# Patient Record
Sex: FEMALE | Race: WHITE | NOT HISPANIC OR LATINO | Employment: OTHER | URBAN - METROPOLITAN AREA
[De-identification: names, ages, dates, MRNs, and addresses within clinical notes are randomized per-mention and may not be internally consistent; named-entity substitution may affect disease eponyms.]

---

## 2017-07-24 ENCOUNTER — TELEPHONE (OUTPATIENT)
Dept: ORTHOPEDIC SURGERY | Facility: CLINIC | Age: 77
End: 2017-07-24

## 2017-07-25 ENCOUNTER — OFFICE VISIT (OUTPATIENT)
Dept: ORTHOPEDIC SURGERY | Facility: CLINIC | Age: 77
End: 2017-07-25

## 2017-07-25 VITALS — TEMPERATURE: 97.3 F | HEIGHT: 62 IN | WEIGHT: 156.8 LBS | BODY MASS INDEX: 28.85 KG/M2

## 2017-07-25 DIAGNOSIS — G89.29 CHRONIC PAIN OF RIGHT KNEE: Primary | ICD-10-CM

## 2017-07-25 DIAGNOSIS — M25.561 CHRONIC PAIN OF RIGHT KNEE: Primary | ICD-10-CM

## 2017-07-25 PROCEDURE — 73562 X-RAY EXAM OF KNEE 3: CPT | Performed by: NURSE PRACTITIONER

## 2017-07-25 PROCEDURE — 99202 OFFICE O/P NEW SF 15 MIN: CPT | Performed by: NURSE PRACTITIONER

## 2017-07-25 RX ORDER — METOPROLOL SUCCINATE 50 MG/1
TABLET, EXTENDED RELEASE ORAL
Refills: 3 | Status: ON HOLD | COMMUNITY
Start: 2017-05-19 | End: 2020-10-22

## 2017-07-25 RX ORDER — PREDNISONE 1 MG/1
TABLET ORAL
Refills: 11 | COMMUNITY
Start: 2017-06-23 | End: 2019-09-04 | Stop reason: SDUPTHER

## 2017-07-25 RX ORDER — MONTELUKAST SODIUM 10 MG/1
10 TABLET ORAL NIGHTLY
COMMUNITY
End: 2019-09-04 | Stop reason: SDUPTHER

## 2017-07-25 RX ORDER — ASPIRIN 81 MG/1
81 TABLET, CHEWABLE ORAL DAILY
COMMUNITY
End: 2020-12-10

## 2017-07-25 RX ORDER — POTASSIUM CHLORIDE 750 MG/1
10 TABLET, EXTENDED RELEASE ORAL 2 TIMES DAILY
COMMUNITY
End: 2019-10-08 | Stop reason: SDUPTHER

## 2017-07-25 RX ORDER — CELECOXIB 100 MG/1
100 CAPSULE ORAL 2 TIMES DAILY PRN
COMMUNITY
End: 2020-02-12

## 2017-07-25 RX ORDER — MELOXICAM 15 MG/1
TABLET ORAL
Refills: 0 | COMMUNITY
Start: 2017-06-19 | End: 2017-10-31

## 2017-07-25 RX ORDER — ERGOCALCIFEROL 1.25 MG/1
CAPSULE ORAL
Refills: 3 | COMMUNITY
Start: 2017-07-09 | End: 2017-08-10

## 2017-07-25 RX ORDER — FUROSEMIDE 40 MG/1
40 TABLET ORAL 2 TIMES DAILY
COMMUNITY
End: 2019-06-20 | Stop reason: SDUPTHER

## 2017-07-25 NOTE — PROGRESS NOTES
Patient: Margaret Gu  YOB: 1940 76 y.o. female  Medical Record Number: [id number]    Chief Complaints:   Chief Complaint   Patient presents with   • Left Knee - Pain, Establish Care   • Right Knee - Pain, Establish Care       History of Present Illness:Margaret Gu is a 76 y.o. female who presents With complaints of severe right knee pain.  Patient reports that it started several months ago and has progressively gotten worse.  She again describes the right knee pain as a severe constant burning type pain with swelling.  She reports the pain is worse with standing sitting walking and only slightly better with ice and rest.  She did see a different orthopedic physician who adjusted cortisone injection a month ago, which did not seem to help much, and she was just started on Celebrex yesterday.  She has been going to physical therapy for the last 3-4 weeks.    Allergies:   Allergies   Allergen Reactions   • Chloraprep One Step [Chlorhexidine Gluconate] Rash     Red and hot   • Dilaudid [Hydromorphone Hcl] Anaphylaxis       Medications:   Current Outpatient Prescriptions   Medication Sig Dispense Refill   • Apoaequorin (PREVAGEN) 10 MG capsule Take  by mouth.     • aspirin 81 MG chewable tablet Chew 81 mg Daily.     • celecoxib (CeleBREX) 100 MG capsule Take 100 mg by mouth 2 (Two) Times a Day As Needed for Mild Pain (1-3).     • furosemide (LASIX) 40 MG tablet Take 40 mg by mouth 2 (Two) Times a Day.     • meloxicam (MOBIC) 15 MG tablet TK 1 T PO QD WF  0   • metoprolol succinate XL (TOPROL-XL) 50 MG 24 hr tablet TK 1 T PO D  3   • montelukast (SINGULAIR) 10 MG tablet Take 10 mg by mouth Every Night.     • potassium chloride (K-DUR,KLOR-CON) 10 MEQ CR tablet Take 10 mEq by mouth 2 (Two) Times a Day.     • predniSONE (DELTASONE) 5 MG tablet TK 1 T PO QAM  11   • vitamin D (ERGOCALCIFEROL) 29715 UNITS capsule capsule TK 1 C PO Q 7 DAYS .  3     No current facility-administered medications  "for this visit.          The following portions of the patient's history were reviewed and updated as appropriate: allergies, current medications, past family history, past medical history, past social history, past surgical history and problem list.    Review of Systems:   A 14 point review of systems was performed. All systems negative except pertinent positives/negative listed in HPI above    Physical Exam:   Vitals:    07/25/17 1400   Temp: 97.3 °F (36.3 °C)   TempSrc: Temporal Artery    Weight: 156 lb 12.8 oz (71.1 kg)   Height: 62\" (157.5 cm)       General: A and O x 3, ASA, NAD    SCLERA:    Normal    DENTITION:   Normal  Skin clear no unusual lesions noted  Right knee patient has trace amount of effusion noted with 115° flexion +10° in extension with a positive Flaca negative Lockman calf is soft and nontender    Radiology:  Xrays 3views (ap,lateral, sunrise) right knee were ordered and reviewed today secondary to pain and show significant bone-on-bone end-stage osteoarthritis.  No comparative views available     Assessment/Plan:  End-stage osteoarthritis right knee    At this point the patient is in the middle of a fall conservative measures.  Would recommend that she continue with physical therapy and Celebrex.  She will return the office in 6 weeks for follow-up with Dr. Castillo and most likely discussed total knee replacement at that time.  "

## 2017-07-25 NOTE — TELEPHONE ENCOUNTER
I would be happy to see the patient today at 1:30 at [place].  Otherwise will need to wait for first available to patient appointment with Dr. Castillo

## 2017-08-09 ENCOUNTER — TELEPHONE (OUTPATIENT)
Dept: ORTHOPEDIC SURGERY | Facility: CLINIC | Age: 77
End: 2017-08-09

## 2017-08-10 ENCOUNTER — OFFICE VISIT (OUTPATIENT)
Dept: ORTHOPEDIC SURGERY | Facility: CLINIC | Age: 77
End: 2017-08-10

## 2017-08-10 VITALS — WEIGHT: 160 LBS | HEIGHT: 62 IN | BODY MASS INDEX: 29.44 KG/M2 | TEMPERATURE: 97.5 F

## 2017-08-10 DIAGNOSIS — M17.11 PRIMARY OSTEOARTHRITIS OF RIGHT KNEE: Primary | ICD-10-CM

## 2017-08-10 PROCEDURE — 20610 DRAIN/INJ JOINT/BURSA W/O US: CPT | Performed by: ORTHOPAEDIC SURGERY

## 2017-08-10 PROCEDURE — 99213 OFFICE O/P EST LOW 20 MIN: CPT | Performed by: ORTHOPAEDIC SURGERY

## 2017-08-10 RX ORDER — MELATONIN
1000 DAILY
COMMUNITY
End: 2017-10-31 | Stop reason: ALTCHOICE

## 2017-08-10 RX ADMIN — BUPIVACAINE HYDROCHLORIDE 2 ML: 5 INJECTION, SOLUTION PERINEURAL at 10:31

## 2017-08-10 RX ADMIN — LIDOCAINE HYDROCHLORIDE 4 ML: 10 INJECTION, SOLUTION INFILTRATION; PERINEURAL at 10:31

## 2017-08-10 RX ADMIN — METHYLPREDNISOLONE ACETATE 80 MG: 80 INJECTION, SUSPENSION INTRA-ARTICULAR; INTRALESIONAL; INTRAMUSCULAR; SOFT TISSUE at 10:31

## 2017-08-17 ENCOUNTER — TELEPHONE (OUTPATIENT)
Dept: ORTHOPEDIC SURGERY | Facility: CLINIC | Age: 77
End: 2017-08-17

## 2017-08-17 NOTE — TELEPHONE ENCOUNTER
Glad she is doing better.  Would recommend continued physical therapy and keep follow-up appointment as scheduled

## 2017-09-14 ENCOUNTER — TELEPHONE (OUTPATIENT)
Dept: ORTHOPEDIC SURGERY | Facility: CLINIC | Age: 77
End: 2017-09-14

## 2017-09-14 NOTE — TELEPHONE ENCOUNTER
Okay to work and with Elyse next Tuesday at [facility].  If she would prefer to wait and she should use ice and elevate the legs

## 2017-09-14 NOTE — TELEPHONE ENCOUNTER
PER [facility] REPLY, PAT NOTIFIED.  PAT PREFERRED TO WAIT UNTIL APPT WITH [facility] 09/28. WAS NOTIFIED TO ICE AND ELEVATE.

## 2017-09-28 ENCOUNTER — OFFICE VISIT (OUTPATIENT)
Dept: ORTHOPEDIC SURGERY | Facility: CLINIC | Age: 77
End: 2017-09-28

## 2017-09-28 VITALS — BODY MASS INDEX: 25.27 KG/M2 | WEIGHT: 148 LBS | HEIGHT: 64 IN | TEMPERATURE: 98 F

## 2017-09-28 DIAGNOSIS — M25.561 CHRONIC PAIN OF RIGHT KNEE: Primary | ICD-10-CM

## 2017-09-28 DIAGNOSIS — M25.551 PAIN OF RIGHT HIP JOINT: ICD-10-CM

## 2017-09-28 DIAGNOSIS — G89.29 CHRONIC PAIN OF RIGHT KNEE: Primary | ICD-10-CM

## 2017-09-28 PROCEDURE — 99213 OFFICE O/P EST LOW 20 MIN: CPT | Performed by: ORTHOPAEDIC SURGERY

## 2017-09-28 NOTE — PROGRESS NOTES
Patient: Margaret Gu  YOB: 1940 77 y.o. female  Medical Record Number: [id number]    Chief Complaints:   Chief Complaint   Patient presents with   • Right Knee - Pain, Follow-up   • Left Knee - Pain, Follow-up       History of Present Illness:Margaret Gu is a 77 y.o. female who presents for follow-up of  Right hip and knee region pain.  This is been ongoing for a few months.  She's had 2 injections in her knee and that really hasn't helped her knee.  She is done physical therapy she states she had a fall but when the pain really began as when she was doing a 22nd leg lift type maneuver with a therapist which she had trouble leaving the therapy officer pain hurts so bad.  The pain is persisted she has trouble getting around she feels as though her leg is going to give out.  It is severe and constant she is miserable due to this pain.    Allergies:   Allergies   Allergen Reactions   • Chloraprep One Step [Chlorhexidine Gluconate] Rash     Red and hot   • Dilaudid [Hydromorphone Hcl] Anaphylaxis       Medications:   Current Outpatient Prescriptions   Medication Sig Dispense Refill   • Apoaequorin (PREVAGEN) 10 MG capsule Take  by mouth.     • aspirin 81 MG chewable tablet Chew 81 mg Daily.     • celecoxib (CeleBREX) 100 MG capsule Take 100 mg by mouth 2 (Two) Times a Day As Needed for Mild Pain (1-3).     • cholecalciferol (VITAMIN D3) 1000 UNITS tablet Take 1,000 Units by mouth Daily.     • furosemide (LASIX) 40 MG tablet Take 40 mg by mouth 2 (Two) Times a Day.     • meloxicam (MOBIC) 15 MG tablet TK 1 T PO QD WF  0   • metoprolol succinate XL (TOPROL-XL) 50 MG 24 hr tablet TK 1 T PO D  3   • montelukast (SINGULAIR) 10 MG tablet Take 10 mg by mouth Every Night.     • potassium chloride (K-DUR,KLOR-CON) 10 MEQ CR tablet Take 10 mEq by mouth 2 (Two) Times a Day.     • predniSONE (DELTASONE) 5 MG tablet TK 1 T PO QAM  11     No current facility-administered medications for this visit.   "        The following portions of the patient's history were reviewed and updated as appropriate: allergies, current medications, past family history, past medical history, past social history, past surgical history and problem list.    Review of Systems:   A 14 point review of systems was performed. All systems negative except pertinent positives/negative listed in HPI above    Physical Exam:   Vitals:    09/28/17 1438   Temp: 98 °F (36.7 °C)   Weight: 148 lb (67.1 kg)   Height: 64\" (162.6 cm)       General: A and O x 3, ASA, NAD    SCLERA:    Normal    DENTITION:   Normal  Knee:  right    ALIGNMENT:     Neutral  ,   Patella tracks   midline    SKIN:    No abnormality    RANGE OF MOTION:   0  -  135   DEG    LIGAMENTS:    No varus / valgus instability.   Negative  Lachman.    MENISCUS:     Negative   Flaca       DISTAL PULSES:    Paplable    DISTAL SENSATION :   Intact    LYMPHATICS:     No   lymphadenopathy    OTHER:          - No  effusion      - No crepitance with ROM      - No Swelling /  tenderness to palpation pes anserine bursa   She has significant groin and medial pubic region pain with extension of her knee.    She walks with a markedly antalgic gait and has pain with every step.  She is intact to light touch has full distal strength but is limited proximally by pain    Assessment/Plan:  Pain in the right groin and pubic region with pain radiates down the proximal thigh.  I don't know if this is organic to the hip or knee.  She may have a tendon or muscle tear or this could be neurologic.  Whenever this is very unusual and is difficult for me to pinpoint exactly.  I'm going to get an MRI of the pelvis to further evaluate the pubic and pelvic region muscle and tendon to see if there is evidence of tear or inflammation.  If this is normal we may have to do an MRI with contrast of lumbar spine to evaluate for possible radiculopathy.  I've given her a  cane.  She will call back for results after the MRI is " done

## 2017-10-01 RX ORDER — METHYLPREDNISOLONE ACETATE 80 MG/ML
80 INJECTION, SUSPENSION INTRA-ARTICULAR; INTRALESIONAL; INTRAMUSCULAR; SOFT TISSUE
Status: COMPLETED | OUTPATIENT
Start: 2017-08-10 | End: 2017-08-10

## 2017-10-01 RX ORDER — BUPIVACAINE HYDROCHLORIDE 5 MG/ML
2 INJECTION, SOLUTION PERINEURAL
Status: COMPLETED | OUTPATIENT
Start: 2017-08-10 | End: 2017-08-10

## 2017-10-01 RX ORDER — LIDOCAINE HYDROCHLORIDE 10 MG/ML
4 INJECTION, SOLUTION INFILTRATION; PERINEURAL
Status: COMPLETED | OUTPATIENT
Start: 2017-08-10 | End: 2017-08-10

## 2017-10-14 ENCOUNTER — HOSPITAL ENCOUNTER (OUTPATIENT)
Dept: MRI IMAGING | Facility: HOSPITAL | Age: 77
Discharge: HOME | End: 2017-10-14
Attending: ORTHOPAEDIC SURGERY | Admitting: ORTHOPAEDIC SURGERY

## 2017-10-14 DIAGNOSIS — M25.551 PAIN OF RIGHT HIP JOINT: ICD-10-CM

## 2017-10-14 PROCEDURE — 72195 MRI PELVIS W/O DYE: CPT

## 2017-10-17 DIAGNOSIS — M54.5 LOW BACK PAIN, UNSPECIFIED BACK PAIN LATERALITY, UNSPECIFIED CHRONICITY, WITH SCIATICA PRESENCE UNSPECIFIED: Primary | ICD-10-CM

## 2017-10-31 ENCOUNTER — ANESTHESIA (OUTPATIENT)
Dept: PAIN MEDICINE | Facility: HOSPITAL | Age: 77
End: 2017-10-31

## 2017-10-31 ENCOUNTER — HOSPITAL ENCOUNTER (OUTPATIENT)
Dept: GENERAL RADIOLOGY | Facility: HOSPITAL | Age: 77
Discharge: HOME | End: 2017-10-31

## 2017-10-31 ENCOUNTER — ANESTHESIA EVENT (OUTPATIENT)
Dept: PAIN MEDICINE | Facility: HOSPITAL | Age: 77
End: 2017-10-31

## 2017-10-31 ENCOUNTER — HOSPITAL ENCOUNTER (OUTPATIENT)
Dept: PAIN MEDICINE | Facility: HOSPITAL | Age: 77
Discharge: HOME | End: 2017-10-31
Admitting: NURSE PRACTITIONER

## 2017-10-31 VITALS
RESPIRATION RATE: 16 BRPM | DIASTOLIC BLOOD PRESSURE: 62 MMHG | TEMPERATURE: 98.1 F | WEIGHT: 150 LBS | HEIGHT: 61 IN | BODY MASS INDEX: 28.32 KG/M2 | HEART RATE: 71 BPM | SYSTOLIC BLOOD PRESSURE: 106 MMHG | OXYGEN SATURATION: 92 %

## 2017-10-31 DIAGNOSIS — R52 PAIN: ICD-10-CM

## 2017-10-31 DIAGNOSIS — M54.5 LOW BACK PAIN, UNSPECIFIED BACK PAIN LATERALITY, UNSPECIFIED CHRONICITY, WITH SCIATICA PRESENCE UNSPECIFIED: ICD-10-CM

## 2017-10-31 PROCEDURE — 0 IOPAMIDOL 41 % SOLUTION: Performed by: ANESTHESIOLOGY

## 2017-10-31 PROCEDURE — C1755 CATHETER, INTRASPINAL: HCPCS

## 2017-10-31 PROCEDURE — 77003 FLUOROGUIDE FOR SPINE INJECT: CPT

## 2017-10-31 PROCEDURE — 25010000002 METHYLPREDNISOLONE PER 80 MG: Performed by: ANESTHESIOLOGY

## 2017-10-31 RX ORDER — CITALOPRAM 20 MG/1
20 TABLET ORAL DAILY
COMMUNITY
End: 2019-06-12 | Stop reason: SDUPTHER

## 2017-10-31 RX ORDER — ERGOCALCIFEROL 1.25 MG/1
50000 CAPSULE ORAL WEEKLY
COMMUNITY
End: 2020-02-12 | Stop reason: SDUPTHER

## 2017-10-31 RX ORDER — LIDOCAINE HYDROCHLORIDE 10 MG/ML
1 INJECTION, SOLUTION INFILTRATION; PERINEURAL ONCE AS NEEDED
Status: DISCONTINUED | OUTPATIENT
Start: 2017-10-31 | End: 2017-11-01 | Stop reason: HOSPADM

## 2017-10-31 RX ORDER — GABAPENTIN 100 MG/1
100 CAPSULE ORAL 3 TIMES DAILY
COMMUNITY
End: 2020-02-12

## 2017-10-31 RX ORDER — DONEPEZIL HYDROCHLORIDE 10 MG/1
10 TABLET, FILM COATED ORAL NIGHTLY
COMMUNITY
End: 2019-09-04 | Stop reason: SDUPTHER

## 2017-10-31 RX ORDER — METHYLPREDNISOLONE ACETATE 80 MG/ML
80 INJECTION, SUSPENSION INTRA-ARTICULAR; INTRALESIONAL; INTRAMUSCULAR; SOFT TISSUE ONCE
Status: COMPLETED | OUTPATIENT
Start: 2017-10-31 | End: 2017-10-31

## 2017-10-31 RX ADMIN — IOPAMIDOL 10 ML: 408 INJECTION, SOLUTION INTRATHECAL at 12:21

## 2017-10-31 RX ADMIN — METHYLPREDNISOLONE ACETATE 80 MG: 80 INJECTION, SUSPENSION INTRA-ARTICULAR; INTRALESIONAL; INTRAMUSCULAR; SOFT TISSUE at 12:21

## 2017-10-31 NOTE — ANESTHESIA PROCEDURE NOTES
PAIN Epidural block    Patient location during procedure: pain clinic  Indication:procedure for pain  Performed By  Anesthesiologist: NATHALIE RDZ  Preanesthetic Checklist  Completed: patient identified, site marked, surgical consent, pre-op evaluation, timeout performed, IV checked, risks and benefits discussed and monitors and equipment checked  Additional Notes  LRAD  Prep:  Pt Position:prone  Sterile Tech:cap, gloves, mask and sterile barrier  Prep:povidone-iodine 7.5% surgical scrub  Monitoring:blood pressure monitoring, continuous pulse oximetry and EKG  Procedure:  Approach:right paramedian  Guidance: fluoroscopy  Location:lumbar  Level:2-3  Needle Type:Tuohy  Needle Gauge:20  Aspiration:negative  Medications:  Depomedrol:80  Preservative Free Saline:3mL  Isovue:2mL    Post Assessment:  Dressing:secured with tape  Pt Tolerance:patient tolerated the procedure well with no apparent complications  Complications:no

## 2017-11-10 ENCOUNTER — TRANSCRIBE ORDERS (OUTPATIENT)
Dept: PAIN MEDICINE | Facility: HOSPITAL | Age: 77
End: 2017-11-10

## 2017-11-10 DIAGNOSIS — M54.5 LOW BACK PAIN, UNSPECIFIED BACK PAIN LATERALITY, UNSPECIFIED CHRONICITY, WITH SCIATICA PRESENCE UNSPECIFIED: Primary | ICD-10-CM

## 2017-11-28 ENCOUNTER — HOSPITAL ENCOUNTER (OUTPATIENT)
Dept: PAIN MEDICINE | Facility: HOSPITAL | Age: 77
Discharge: HOME | End: 2017-11-28
Admitting: NURSE PRACTITIONER

## 2017-11-28 ENCOUNTER — HOSPITAL ENCOUNTER (OUTPATIENT)
Dept: GENERAL RADIOLOGY | Facility: HOSPITAL | Age: 77
Discharge: HOME | End: 2017-11-28

## 2017-11-28 ENCOUNTER — ANESTHESIA EVENT (OUTPATIENT)
Dept: PAIN MEDICINE | Facility: HOSPITAL | Age: 77
End: 2017-11-28

## 2017-11-28 ENCOUNTER — ANESTHESIA (OUTPATIENT)
Dept: PAIN MEDICINE | Facility: HOSPITAL | Age: 77
End: 2017-11-28

## 2017-11-28 ENCOUNTER — TRANSCRIBE ORDERS (OUTPATIENT)
Dept: PAIN MEDICINE | Facility: HOSPITAL | Age: 77
End: 2017-11-28

## 2017-11-28 VITALS
OXYGEN SATURATION: 92 % | RESPIRATION RATE: 16 BRPM | DIASTOLIC BLOOD PRESSURE: 54 MMHG | HEART RATE: 70 BPM | SYSTOLIC BLOOD PRESSURE: 112 MMHG

## 2017-11-28 DIAGNOSIS — M54.5 LOW BACK PAIN, UNSPECIFIED BACK PAIN LATERALITY, UNSPECIFIED CHRONICITY, WITH SCIATICA PRESENCE UNSPECIFIED: ICD-10-CM

## 2017-11-28 DIAGNOSIS — R52 PAIN: ICD-10-CM

## 2017-11-28 DIAGNOSIS — M54.5 LOW BACK PAIN, UNSPECIFIED BACK PAIN LATERALITY, UNSPECIFIED CHRONICITY, WITH SCIATICA PRESENCE UNSPECIFIED: Primary | ICD-10-CM

## 2017-11-28 PROCEDURE — 25010000002 METHYLPREDNISOLONE PER 80 MG: Performed by: ANESTHESIOLOGY

## 2017-11-28 PROCEDURE — 77003 FLUOROGUIDE FOR SPINE INJECT: CPT

## 2017-11-28 PROCEDURE — C1755 CATHETER, INTRASPINAL: HCPCS

## 2017-11-28 PROCEDURE — 0 IOPAMIDOL 41 % SOLUTION: Performed by: ANESTHESIOLOGY

## 2017-11-28 RX ORDER — LIDOCAINE HYDROCHLORIDE 20 MG/ML
5 INJECTION, SOLUTION INFILTRATION; PERINEURAL ONCE
Status: COMPLETED | OUTPATIENT
Start: 2017-11-28 | End: 2017-11-28

## 2017-11-28 RX ORDER — SODIUM CHLORIDE 0.9 % (FLUSH) 0.9 %
1-10 SYRINGE (ML) INJECTION AS NEEDED
Status: DISCONTINUED | OUTPATIENT
Start: 2017-11-28 | End: 2017-11-29 | Stop reason: HOSPADM

## 2017-11-28 RX ORDER — MIDAZOLAM HYDROCHLORIDE 1 MG/ML
1 INJECTION INTRAMUSCULAR; INTRAVENOUS AS NEEDED
Status: DISCONTINUED | OUTPATIENT
Start: 2017-11-28 | End: 2017-11-29 | Stop reason: HOSPADM

## 2017-11-28 RX ORDER — METHYLPREDNISOLONE ACETATE 80 MG/ML
80 INJECTION, SUSPENSION INTRA-ARTICULAR; INTRALESIONAL; INTRAMUSCULAR; SOFT TISSUE ONCE
Status: COMPLETED | OUTPATIENT
Start: 2017-11-28 | End: 2017-11-28

## 2017-11-28 RX ORDER — LIDOCAINE HYDROCHLORIDE 10 MG/ML
1 INJECTION, SOLUTION INFILTRATION; PERINEURAL ONCE AS NEEDED
Status: DISCONTINUED | OUTPATIENT
Start: 2017-11-28 | End: 2017-11-29 | Stop reason: HOSPADM

## 2017-11-28 RX ORDER — FENTANYL CITRATE 50 UG/ML
50 INJECTION, SOLUTION INTRAMUSCULAR; INTRAVENOUS AS NEEDED
Status: DISCONTINUED | OUTPATIENT
Start: 2017-11-28 | End: 2017-11-29 | Stop reason: HOSPADM

## 2017-11-28 RX ADMIN — METHYLPREDNISOLONE ACETATE 80 MG: 80 INJECTION, SUSPENSION INTRA-ARTICULAR; INTRALESIONAL; INTRAMUSCULAR; SOFT TISSUE at 12:26

## 2017-11-28 RX ADMIN — IOPAMIDOL 10 ML: 408 INJECTION, SOLUTION INTRATHECAL at 12:26

## 2017-11-28 RX ADMIN — LIDOCAINE HYDROCHLORIDE 5 ML: 20 INJECTION, SOLUTION EPIDURAL; INFILTRATION; INTRACAUDAL; PERINEURAL at 12:25

## 2017-11-28 NOTE — H&P (VIEW-ONLY)
"[facility]    History and Physical    Patient Name: Margaret Gu  :  1940  MRN:  [id number]  Date of Admission: 10/31/2017    Subjective     Patient is a 77 y.o. female presents with chief complaint of chronic low back pain.  Onset of symptoms was gradual starting 6 months ago.  Symptoms are associated/aggravated by activity. Symptoms improve with ice and rest.  Pain radiate to right thigh and groin, 6-10/10.    The following portions of the patients history were reviewed and updated as appropriate: current medications, allergies, past medical history, past surgical history, past family history, past social history and problem list                Objective     Past Medical History:   Past Medical History:   Diagnosis Date   • A-fib    • Anemia    • Asthma    • COPD (chronic obstructive pulmonary disease)    • Depression    • GERD (gastroesophageal reflux disease)    • Sleep apnea      Past Surgical History:   Past Surgical History:   Procedure Laterality Date   • APPENDECTOMY     • BACK SURGERY      6 back surgeries   • CARDIAC ABLATION     • CATARACT EXTRACTION, BILATERAL Bilateral    • CHOLECYSTECTOMY     • HYSTERECTOMY     • JOINT REPLACEMENT Bilateral     shoulder   • TONSILLECTOMY AND ADENOIDECTOMY       Family History: History reviewed. No pertinent family history.  Social History:   Social History   Substance Use Topics   • Smoking status: Never Smoker   • Smokeless tobacco: Never Used   • Alcohol use Defer       Vital Signs Range for the last 24 hours  Temperature: Temp:  [36.7 °C (98.1 °F)] 36.7 °C (98.1 °F)   Temp Source: Temp src: Oral   BP: BP: (134)/(72) 134/72   Pulse: Heart Rate:  [72] 72   Respirations: Resp:  [16] 16   SPO2: SpO2:  [95 %] 95 %   O2 Amount (l/min):     O2 Devices O2 Device: room air   Weight: Weight:  [150 lb (68 kg)] 150 lb (68 kg)     Flowsheet Rows         First Filed Value    Admission Height  61\" (154.9 cm) Documented at 10/31/2017 1149    Admission Weight  " 150 lb (68 kg) Documented at 10/31/2017 1149          --------------------------------------------------------------------------------    Current Outpatient Prescriptions   Medication Sig Dispense Refill   • Apoaequorin (PREVAGEN) 10 MG capsule Take  by mouth.     • aspirin 81 MG chewable tablet Chew 81 mg Daily.     • celecoxib (CeleBREX) 100 MG capsule Take 100 mg by mouth 2 (Two) Times a Day As Needed for Mild Pain (1-3).     • citalopram (CeleXA) 20 MG tablet Take 20 mg by mouth Daily.     • donepezil (ARICEPT) 10 MG tablet Take 10 mg by mouth Every Night.     • furosemide (LASIX) 40 MG tablet Take 40 mg by mouth 2 (Two) Times a Day.     • gabapentin (NEURONTIN) 100 MG capsule Take 100 mg by mouth 3 (Three) Times a Day.     • metoprolol succinate XL (TOPROL-XL) 50 MG 24 hr tablet TK 1 T PO D  3   • montelukast (SINGULAIR) 10 MG tablet Take 10 mg by mouth Every Night.     • potassium chloride (K-DUR,KLOR-CON) 10 MEQ CR tablet Take 10 mEq by mouth 2 (Two) Times a Day.     • predniSONE (DELTASONE) 5 MG tablet TK 1 T PO QAM  11   • vitamin D (ERGOCALCIFEROL) 40222 units capsule capsule Take 50,000 Units by mouth 1 (One) Time Per Week.       No current facility-administered medications for this encounter.        --------------------------------------------------------------------------------  Assessment/Plan      Anesthesia Evaluation     Patient summary reviewed and Nursing notes reviewed          Airway   Mallampati: II  TM distance: >3 FB  Neck ROM: full  no difficulty expected  Dental - normal exam     Pulmonary     breath sounds clear to auscultation  (+) COPD, asthma, sleep apnea on CPAP,   Cardiovascular - normal exam  Exercise tolerance: good (4-7 METS)    Rhythm: regular  Rate: normal    (+) hypertension, dysrhythmias Atrial Fib,       Neuro/Psych- neuro exam normal  (+) psychiatric history Depression and Anxiety,    GI/Hepatic/Renal/Endo    (+)  GERD,     Musculoskeletal (-) normal exam    (+) back pain,  Straight Leg Test,   Abdominal  - normal exam   Substance History - negative use     OB/GYN          Other   (+) arthritis                              Diagnosis and Plan    Treatment Plan  ASA 3      Procedures: Lumbar Epidural Steroid Injection(LESI), With fluoroscopy,       Anesthetic plan and risks discussed with patient.          Diagnosis     * Lumbar radiculopathy [M54.16]

## 2017-11-28 NOTE — PLAN OF CARE
Problem: Pain, Chronic (Adult)  Goal: Acceptable Pain Control/Comfort Level  Outcome: Ongoing (interventions implemented as appropriate)      
Problem: Pain, Chronic (Adult)  Goal: Identify Related Risk Factors and Signs and Symptoms  Outcome: Ongoing (interventions implemented as appropriate)      
normal...

## 2017-11-28 NOTE — INTERVAL H&P NOTE
[facility]  H&P reviewed. No changes since last visit.  Patient states   50-75% improvement since the last procedure/injection.  Relief lasted in full for approx 3 weeks.       Diagnosis     * Lumbar radiculopathy [M54.16]      Airway assessed since last visit. Airway class equals: 2.    Plan:  1. Epidural with fluoroscopy +/- epidurogram (if needed)  2. Physical therapy exercises at home as prescribed by physical therapy or from the pain clinic handout (given to the patient). Continuation of these exercises every day, or multiple times per week, even when the patient has good pain relief, was stressed to the patient as a preventative measure to decrease the frequency and severity of future pain episodes.  3. Continue pain medicines as already prescribed. If patient not currently taking any, it is recommended to begin Acetaminophen 1000 mg po q 8 hours. If other medicines containing Acetaminophen are currently prescribed, maintain daily dose at 3000mg.   4. If they can tolerate NSAIDS, it is recommended to take Ibuprofen 600 mg po q 6 hours for 7 days during pain exacerbations. Alternatively, they may substitute an NSAID of their choice (e.g. Aleve)  5. Heat and ice to the affected area as tolerated for pain control. It was discussed that heating pads can cause burns.  6. Low impact exercise such as walking or water exercise was recommended to maintain overall health and aid in weight control.   7. Follow up as needed for subsequent injections.  8. Patient was counseled to abstain from tobacco products.

## 2018-01-09 ENCOUNTER — HOSPITAL ENCOUNTER (OUTPATIENT)
Dept: PAIN MEDICINE | Facility: HOSPITAL | Age: 78
Discharge: HOME | End: 2018-01-09
Admitting: NURSE PRACTITIONER

## 2018-01-09 ENCOUNTER — ANESTHESIA (OUTPATIENT)
Dept: PAIN MEDICINE | Facility: HOSPITAL | Age: 78
End: 2018-01-09

## 2018-01-09 ENCOUNTER — ANESTHESIA EVENT (OUTPATIENT)
Dept: PAIN MEDICINE | Facility: HOSPITAL | Age: 78
End: 2018-01-09

## 2018-01-09 ENCOUNTER — HOSPITAL ENCOUNTER (OUTPATIENT)
Dept: GENERAL RADIOLOGY | Facility: HOSPITAL | Age: 78
Discharge: HOME | End: 2018-01-09

## 2018-01-09 VITALS
OXYGEN SATURATION: 93 % | BODY MASS INDEX: 29.44 KG/M2 | WEIGHT: 160 LBS | TEMPERATURE: 98.1 F | RESPIRATION RATE: 16 BRPM | DIASTOLIC BLOOD PRESSURE: 61 MMHG | HEIGHT: 62 IN | SYSTOLIC BLOOD PRESSURE: 120 MMHG | HEART RATE: 53 BPM

## 2018-01-09 DIAGNOSIS — M54.5 LOW BACK PAIN, UNSPECIFIED BACK PAIN LATERALITY, UNSPECIFIED CHRONICITY, WITH SCIATICA PRESENCE UNSPECIFIED: ICD-10-CM

## 2018-01-09 DIAGNOSIS — R52 PAIN: ICD-10-CM

## 2018-01-09 PROCEDURE — 77003 FLUOROGUIDE FOR SPINE INJECT: CPT

## 2018-01-09 PROCEDURE — C1755 CATHETER, INTRASPINAL: HCPCS

## 2018-01-09 PROCEDURE — 25010000002 METHYLPREDNISOLONE PER 80 MG: Performed by: ANESTHESIOLOGY

## 2018-01-09 PROCEDURE — 0 IOPAMIDOL 41 % SOLUTION: Performed by: ANESTHESIOLOGY

## 2018-01-09 RX ORDER — LIDOCAINE HYDROCHLORIDE 10 MG/ML
1 INJECTION, SOLUTION INFILTRATION; PERINEURAL ONCE AS NEEDED
Status: DISCONTINUED | OUTPATIENT
Start: 2018-01-09 | End: 2018-01-10 | Stop reason: HOSPADM

## 2018-01-09 RX ORDER — MIDAZOLAM HYDROCHLORIDE 1 MG/ML
1 INJECTION INTRAMUSCULAR; INTRAVENOUS AS NEEDED
Status: DISCONTINUED | OUTPATIENT
Start: 2018-01-09 | End: 2018-01-10 | Stop reason: HOSPADM

## 2018-01-09 RX ORDER — METHYLPREDNISOLONE ACETATE 80 MG/ML
80 INJECTION, SUSPENSION INTRA-ARTICULAR; INTRALESIONAL; INTRAMUSCULAR; SOFT TISSUE ONCE
Status: COMPLETED | OUTPATIENT
Start: 2018-01-09 | End: 2018-01-09

## 2018-01-09 RX ORDER — SODIUM CHLORIDE 0.9 % (FLUSH) 0.9 %
1-10 SYRINGE (ML) INJECTION AS NEEDED
Status: DISCONTINUED | OUTPATIENT
Start: 2018-01-09 | End: 2018-01-10 | Stop reason: HOSPADM

## 2018-01-09 RX ORDER — FENTANYL CITRATE 50 UG/ML
50 INJECTION, SOLUTION INTRAMUSCULAR; INTRAVENOUS AS NEEDED
Status: DISCONTINUED | OUTPATIENT
Start: 2018-01-09 | End: 2018-01-10 | Stop reason: HOSPADM

## 2018-01-09 RX ADMIN — IOPAMIDOL 3 ML: 408 INJECTION, SOLUTION INTRATHECAL at 11:13

## 2018-01-09 RX ADMIN — METHYLPREDNISOLONE ACETATE 80 MG: 80 INJECTION, SUSPENSION INTRA-ARTICULAR; INTRALESIONAL; INTRAMUSCULAR; SOFT TISSUE at 11:13

## 2018-01-09 NOTE — H&P
INTERVAL HISTORY:    The patient returns for another Lumbar epidural steroid injection today.  They have received 50-75% improvement since their last injection with a pain level of 6/10 at its worst recently.    Conservative measures tried in the interim     Exam:  There were no vitals taken for this visit.  Airway Mallampatti 2  Alert and oriented      Diagnosis:  Post-Op Diagnosis Codes:     * Lumbar radiculopathy [M54.16]    Plan:  Lumbar epidural steroid injection under fluoroscopic guidance    I have encouraged them to continue:  1.  Physical therapy exercises at home as prescribed by physical therapy or from the pain clinic handout (given to the patient).  Continuation of these exercises every day, or multiple times per week, even when the patient has good pain relief, was stressed to the patient as a preventative measure to decrease the frequency and severity of future pain episodes.  2.  Continue pain medicines as already prescribed.  If patient not currently taking any, it is recommended to begin Acetaminophen 1000 mg po q 8 hours.  If other medicines containing Acetaminophen are currently prescribed, maintain daily dose at 3000mg.    3.  If they can tolerate NSAIDS, it is recommended to take Ibuprofen 600 mg po q 6 hours for 7 days during pain exacerbations.   Alternatively, they may substitute an NSAID of their choice (e.g. Aleve)  4.  Heat and ice to the affected area as tolerated for pain control.  It was discussed that heating pads can cause burns.  5.  Low impact exercise such as walking or water exercise was recommended to maintain overall health and aid in weight control.   6.  Follow up as needed for subsequent injections.  7.  Patient was counseled to abstain from tobacco products.

## 2018-01-09 NOTE — ANESTHESIA PROCEDURE NOTES
PAIN Epidural block    Patient location during procedure: pain clinic  Start Time: 1/9/2018 11:10 AM  Stop Time: 1/9/2018 11:15 AM  Indication:procedure for pain  Performed By  Anesthesiologist: MINERVA ESTRADA  Preanesthetic Checklist  Completed: patient identified, surgical consent, pre-op evaluation, timeout performed, IV checked, risks and benefits discussed and monitors and equipment checked  Additional Notes  Diagnosis:  Post-Op Diagnosis Codes:     * Lumbar radiculopathy (M54.16)      Prep:  Pt Position:prone  Sterile Tech:gloves, mask and sterile barrier  Prep:povidone-iodine 7.5% surgical scrub  Monitoring:blood pressure monitoring, continuous pulse oximetry and EKG  Procedure:  Sedation: no   Approach:right paramedian  Guidance: fluoroscopy  Location:lumbar  Level:2-3  Needle Type:Tuohy  Needle Gauge:20  Aspiration:negative  Test Dose:negative  Medications:  Depomedrol:80 mg  Preservative Free Saline:3mL  Isovue:3mL    Post Assessment:  Dressing:occlusive dressing applied  Pt Tolerance:patient tolerated the procedure well with no apparent complications  Complications:no

## 2019-04-30 ENCOUNTER — OFFICE VISIT (OUTPATIENT)
Dept: INTERNAL MEDICINE | Facility: CLINIC | Age: 79
End: 2019-04-30

## 2019-04-30 VITALS
BODY MASS INDEX: 28.02 KG/M2 | DIASTOLIC BLOOD PRESSURE: 64 MMHG | HEART RATE: 74 BPM | HEIGHT: 61 IN | SYSTOLIC BLOOD PRESSURE: 122 MMHG | WEIGHT: 148.4 LBS

## 2019-04-30 DIAGNOSIS — E87.6 HYPOKALEMIA: Primary | ICD-10-CM

## 2019-04-30 DIAGNOSIS — E27.40 ADRENAL INSUFFICIENCY (HCC): ICD-10-CM

## 2019-04-30 DIAGNOSIS — I10 ESSENTIAL HYPERTENSION: ICD-10-CM

## 2019-04-30 PROBLEM — I48.0 PAROXYSMAL ATRIAL FIBRILLATION: Status: ACTIVE | Noted: 2019-04-30

## 2019-04-30 PROBLEM — I48.91 A-FIB: Status: ACTIVE | Noted: 2019-04-30

## 2019-04-30 PROBLEM — G31.84 MILD COGNITIVE IMPAIRMENT: Status: ACTIVE | Noted: 2019-04-30

## 2019-04-30 PROBLEM — F32.A DEPRESSION: Status: ACTIVE | Noted: 2019-04-30

## 2019-04-30 PROBLEM — I38 VALVULAR HEART DISEASE: Status: ACTIVE | Noted: 2019-04-30

## 2019-04-30 PROBLEM — R41.3 MEMORY LOSS: Status: ACTIVE | Noted: 2019-04-30

## 2019-04-30 PROCEDURE — 99213 OFFICE O/P EST LOW 20 MIN: CPT | Performed by: INTERNAL MEDICINE

## 2019-04-30 NOTE — PROGRESS NOTES
Assessment and Plan  Problems Addressed this Visit        Cardiovascular and Mediastinum    Hypertension       Endocrine    Adrenal insufficiency (CMS/HCC)       Other    Hypokalemia - Primary    Relevant Orders    Basic Metabolic Panel (Completed)      recheck potassium  Doing great- no other changes are indicated   F/U and Patient Instructions    Return in about 6 months (around 10/30/2019) for Medicare Wellness.  There are no Patient Instructions on file for this visit.    Subjective    Margaret Carrera is a 78 y.o. female being seen in our office today for Memory Loss and Hypertension     History of the Present Illness  HPI Here for reg f/u- has been feeling great- moved to an assisted living facility and loves it. She thinks her memory is better.  Her mood has been good.  She is not driving any more, had a lot issues with getting lost.   She is eating well but has lost a few lbs.  She thiinks it's because of increased activity   Patient History        Significant Past History  The following portions of the patient's history were reviewed and updated as appropriate:PMHroutine: Social history , Allergies, Current Medications, Active Problem List and Health Maintenance              Social History  She  reports that she has never smoked. She has never used smokeless tobacco. Alcohol use questions deferred to the physician. She reports that she does not use drugs.                         Review of Symptoms  Review of Systems   Constitution: Negative for decreased appetite, weakness and weight loss.   HENT: Negative.    Cardiovascular: Negative.    Respiratory: Negative.    Musculoskeletal: Negative.    Gastrointestinal: Negative.      Objective  Vital Signs         BP Readings from Last 1 Encounters:   04/30/19 122/64     Wt Readings from Last 3 Encounters:   04/30/19 67.3 kg (148 lb 6.4 oz)   01/09/18 72.6 kg (160 lb)   10/31/17 68 kg (150 lb)   Body mass index is 28.04 kg/m².          Physical Exam   Physical Exam    Constitutional: She is oriented to person, place, and time. No distress.   Cardiovascular: Normal rate and regular rhythm.   Pulmonary/Chest: Breath sounds normal.   Musculoskeletal: Normal range of motion.   Neurological: She is alert and oriented to person, place, and time.   Skin: Skin is warm.   Psychiatric: She has a normal mood and affect. Her behavior is normal.     Data Reviewed    Recent Results (from the past 2016 hour(s))   Basic Metabolic Panel    Collection Time: 04/30/19  3:43 PM   Result Value Ref Range    Glucose 100 (H) 65 - 99 mg/dL    BUN 9 8 - 23 mg/dL    Creatinine 0.86 0.57 - 1.00 mg/dL    eGFR Non African Am 64 >60 mL/min/1.73    eGFR African Am 77 >60 mL/min/1.73    BUN/Creatinine Ratio 10.5 7.0 - 25.0    Sodium 142 136 - 145 mmol/L    Potassium 4.6 3.5 - 5.2 mmol/L    Chloride 105 98 - 107 mmol/L    Total CO2 23.5 22.0 - 29.0 mmol/L    Calcium 9.9 8.6 - 10.5 mg/dL

## 2019-05-01 LAB
BUN SERPL-MCNC: 9 MG/DL (ref 8–23)
BUN/CREAT SERPL: 10.5 (ref 7–25)
CALCIUM SERPL-MCNC: 9.9 MG/DL (ref 8.6–10.5)
CHLORIDE SERPL-SCNC: 105 MMOL/L (ref 98–107)
CO2 SERPL-SCNC: 23.5 MMOL/L (ref 22–29)
CREAT SERPL-MCNC: 0.86 MG/DL (ref 0.57–1)
GLUCOSE SERPL-MCNC: 100 MG/DL (ref 65–99)
POTASSIUM SERPL-SCNC: 4.6 MMOL/L (ref 3.5–5.2)
SODIUM SERPL-SCNC: 142 MMOL/L (ref 136–145)

## 2019-06-12 RX ORDER — CITALOPRAM 20 MG/1
20 TABLET ORAL DAILY
Qty: 90 TABLET | Refills: 3 | Status: SHIPPED | OUTPATIENT
Start: 2019-06-12 | End: 2019-06-17 | Stop reason: SDUPTHER

## 2019-06-17 RX ORDER — CITALOPRAM 20 MG/1
20 TABLET ORAL DAILY
Qty: 90 TABLET | Refills: 3 | Status: SHIPPED | OUTPATIENT
Start: 2019-06-17 | End: 2019-06-20 | Stop reason: SDUPTHER

## 2019-06-20 RX ORDER — CITALOPRAM 20 MG/1
20 TABLET ORAL DAILY
Qty: 30 TABLET | Refills: 0 | Status: SHIPPED | OUTPATIENT
Start: 2019-06-20 | End: 2020-01-14 | Stop reason: SDUPTHER

## 2019-06-20 RX ORDER — FUROSEMIDE 40 MG/1
40 TABLET ORAL DAILY
Qty: 30 TABLET | Refills: 0 | Status: SHIPPED | OUTPATIENT
Start: 2019-06-20 | End: 2019-06-27 | Stop reason: SDUPTHER

## 2019-06-27 RX ORDER — FUROSEMIDE 40 MG/1
40 TABLET ORAL DAILY
Qty: 30 TABLET | Refills: 3 | Status: SHIPPED | OUTPATIENT
Start: 2019-06-27 | End: 2019-07-22 | Stop reason: SDUPTHER

## 2019-07-22 ENCOUNTER — OFFICE VISIT (OUTPATIENT)
Dept: INTERNAL MEDICINE | Facility: CLINIC | Age: 79
End: 2019-07-22

## 2019-07-22 ENCOUNTER — TELEPHONE (OUTPATIENT)
Dept: INTERNAL MEDICINE | Facility: CLINIC | Age: 79
End: 2019-07-22

## 2019-07-22 VITALS
HEART RATE: 74 BPM | DIASTOLIC BLOOD PRESSURE: 62 MMHG | WEIGHT: 150 LBS | BODY MASS INDEX: 28.32 KG/M2 | SYSTOLIC BLOOD PRESSURE: 110 MMHG | HEIGHT: 61 IN

## 2019-07-22 DIAGNOSIS — E27.40 ADRENAL INSUFFICIENCY (HCC): ICD-10-CM

## 2019-07-22 DIAGNOSIS — B34.9 ACUTE VIRAL SYNDROME: Primary | ICD-10-CM

## 2019-07-22 LAB
BILIRUB BLD-MCNC: NEGATIVE MG/DL
CLARITY, POC: CLEAR
COLOR UR: YELLOW
GLUCOSE UR STRIP-MCNC: NEGATIVE MG/DL
KETONES UR QL: NEGATIVE
LEUKOCYTE EST, POC: NEGATIVE
NITRITE UR-MCNC: NEGATIVE MG/ML
PH UR: 6 [PH] (ref 5–8)
PROT UR STRIP-MCNC: NEGATIVE MG/DL
RBC # UR STRIP: NEGATIVE /UL
SP GR UR: 1.01 (ref 1–1.03)
UROBILINOGEN UR QL: NORMAL

## 2019-07-22 PROCEDURE — 99213 OFFICE O/P EST LOW 20 MIN: CPT | Performed by: INTERNAL MEDICINE

## 2019-07-22 PROCEDURE — 81003 URINALYSIS AUTO W/O SCOPE: CPT | Performed by: INTERNAL MEDICINE

## 2019-07-22 RX ORDER — FUROSEMIDE 20 MG/1
20 TABLET ORAL DAILY
Qty: 90 TABLET | Refills: 1 | Status: SHIPPED | OUTPATIENT
Start: 2019-07-22 | End: 2019-09-04 | Stop reason: SDUPTHER

## 2019-07-22 NOTE — PROGRESS NOTES
Assessment and Plan  Margaret was seen today for cough, uri, fatigue and back pain.    Diagnoses and all orders for this visit:    Acute viral syndrome  Comments:  suspect the main problem- urine negative  Check labs, hydrate, rest, treat symptoms and call if worsens or fails to improve.     Adrenal insufficiency (CMS/HCC)  -     CBC & Differential  -     Comprehensive Metabolic Panel  -     POCT urinalysis dipstick, automated    Other orders  -     furosemide (LASIX) 20 MG tablet; Take 1 tablet by mouth Daily.  -     Manual Differential      F/U and Patient Instructions    Return in about 4 weeks (around 8/19/2019).  There are no Patient Instructions on file for this visit.    Subjective    Margaret Carrera is a 78 y.o. female being seen in our office today for Cough; URI; Fatigue; and Back Pain     History of the Present Illness  HPI  Here with a few days of decreased energy, low back pain, dizziness.  She has little appetite.  She is mildly nauseated.  There is little focal complaint just overall not feeling well- fatigued, achy, etc.     Patient History        Significant Past History  The following portions of the patient's history were reviewed and updated as appropriate:PMHroutine: Social history , Allergies, Current Medications, Active Problem List and Health Maintenance              Social History  She  reports that she has never smoked. She has never used smokeless tobacco. Alcohol use questions deferred to the physician. She reports that she does not use drugs.                         Review of Symptoms  Review of Systems   Constitution: Positive for chills, decreased appetite, weakness and malaise/fatigue. Negative for fever.   HENT: Negative.    Cardiovascular: Negative.    Respiratory: Negative.    Skin: Negative.    Gastrointestinal: Negative.    Genitourinary: Negative.      Objective  Vital Signs         BP Readings from Last 1 Encounters:   07/22/19 110/62     Wt Readings from Last 3 Encounters:    07/22/19 68 kg (150 lb)   04/30/19 67.3 kg (148 lb 6.4 oz)   01/09/18 72.6 kg (160 lb)   Body mass index is 28.34 kg/m².          Physical Exam   Physical Exam   Constitutional:   Looks like she doesn't feel well, nontoxic.   HENT:   Mouth/Throat: No oropharyngeal exudate.   Eyes: Conjunctivae are normal.   Cardiovascular: Normal rate and regular rhythm.   Pulmonary/Chest: Effort normal and breath sounds normal.   Abdominal: Soft. Bowel sounds are normal.   Musculoskeletal: She exhibits no edema.   Lymphadenopathy:     She has no cervical adenopathy.   Skin: No rash noted.     Data Reviewed    Recent Results (from the past 2016 hour(s))   Basic Metabolic Panel    Collection Time: 04/30/19  3:43 PM   Result Value Ref Range    Glucose 100 (H) 65 - 99 mg/dL    BUN 9 8 - 23 mg/dL    Creatinine 0.86 0.57 - 1.00 mg/dL    eGFR Non African Am 64 >60 mL/min/1.73    eGFR African Am 77 >60 mL/min/1.73    BUN/Creatinine Ratio 10.5 7.0 - 25.0    Sodium 142 136 - 145 mmol/L    Potassium 4.6 3.5 - 5.2 mmol/L    Chloride 105 98 - 107 mmol/L    Total CO2 23.5 22.0 - 29.0 mmol/L    Calcium 9.9 8.6 - 10.5 mg/dL   CBC & Differential    Collection Time: 07/22/19  4:16 PM   Result Value Ref Range    WBC 3.69 3.40 - 10.80 10*3/mm3    RBC 4.56 3.77 - 5.28 10*6/mm3    Hemoglobin 13.8 12.0 - 15.9 g/dL    Hematocrit 43.2 34.0 - 46.6 %    MCV 94.7 79.0 - 97.0 fL    MCH 30.3 26.6 - 33.0 pg    MCHC 31.9 31.5 - 35.7 g/dL    RDW 14.1 12.3 - 15.4 %    Platelets 103 (L) 140 - 450 10*3/mm3    Neutrophil Rel % CANCELED     Lymphocyte Rel % CANCELED     Monocyte Rel % CANCELED     Eosinophil Rel % CANCELED     Lymphocytes Absolute CANCELED     Eosinophils Absolute CANCELED     Basophils Absolute CANCELED    Comprehensive Metabolic Panel    Collection Time: 07/22/19  4:16 PM   Result Value Ref Range    Glucose 84 65 - 99 mg/dL    BUN 14 8 - 23 mg/dL    Creatinine 1.13 (H) 0.57 - 1.00 mg/dL    eGFR Non African Am 46 (L) >60 mL/min/1.73    eGFR   Am 56 (L) >60 mL/min/1.73    BUN/Creatinine Ratio 12.4 7.0 - 25.0    Sodium 143 136 - 145 mmol/L    Potassium 4.1 3.5 - 5.2 mmol/L    Chloride 102 98 - 107 mmol/L    Total CO2 29.0 22.0 - 29.0 mmol/L    Calcium 9.1 8.6 - 10.5 mg/dL    Total Protein 7.1 6.0 - 8.5 g/dL    Albumin 4.70 3.50 - 5.20 g/dL    Globulin 2.4 gm/dL    A/G Ratio 2.0 g/dL    Total Bilirubin 1.1 0.2 - 1.2 mg/dL    Alkaline Phosphatase 80 39 - 117 U/L    AST (SGOT) 34 (H) 1 - 32 U/L    ALT (SGPT) 19 1 - 33 U/L   Manual Differential    Collection Time: 07/22/19  4:16 PM   Result Value Ref Range    Neutrophil Rel % 20.8 (L) 42.7 - 76.0 %    Lymphocyte Rel % 70.8 (H) 19.6 - 45.3 %    Monocyte Rel % 1.9 (L) 5.0 - 12.0 %    Eosinophil Rel % 1.9 0.3 - 6.2 %    Neutrophils Absolute 0.77 (L) 1.70 - 7.00 10*3/mm3    Lymphocytes Absolute 2.61 0.70 - 3.10 10*3/mm3    Monocytes Absolute 0.07 (L) 0.10 - 0.90 10*3/mm3    Eosinophil Abs 0.07 0.00 - 0.40 10*3/mm3    Differential Comment Comment     Comment Comment     Plt Comment Comment    POCT urinalysis dipstick, automated    Collection Time: 07/22/19  5:06 PM   Result Value Ref Range    Color Yellow Yellow, Straw, Dark Yellow, Elise    Clarity, UA Clear Clear    Specific Gravity  1.015 1.005 - 1.030    pH, Urine 6.0 5.0 - 8.0    Leukocytes Negative Negative    Nitrite, UA Negative Negative    Protein, POC Negative Negative mg/dL    Glucose, UA Negative Negative, 1000 mg/dL (3+) mg/dL    Ketones, UA Negative Negative    Urobilinogen, UA Normal Normal    Bilirubin Negative Negative    Blood, UA Negative Negative

## 2019-07-23 DIAGNOSIS — B34.9 VIRAL INFECTION: Primary | ICD-10-CM

## 2019-07-23 LAB
ALBUMIN SERPL-MCNC: 4.7 G/DL (ref 3.5–5.2)
ALBUMIN/GLOB SERPL: 2 G/DL
ALP SERPL-CCNC: 80 U/L (ref 39–117)
ALT SERPL-CCNC: 19 U/L (ref 1–33)
AST SERPL-CCNC: 34 U/L (ref 1–32)
BASOPHILS # BLD AUTO: (no result) 10*3/UL
BILIRUB SERPL-MCNC: 1.1 MG/DL (ref 0.2–1.2)
BUN SERPL-MCNC: 14 MG/DL (ref 8–23)
BUN/CREAT SERPL: 12.4 (ref 7–25)
CALCIUM SERPL-MCNC: 9.1 MG/DL (ref 8.6–10.5)
CHLORIDE SERPL-SCNC: 102 MMOL/L (ref 98–107)
CO2 SERPL-SCNC: 29 MMOL/L (ref 22–29)
CREAT SERPL-MCNC: 1.13 MG/DL (ref 0.57–1)
DIFFERENTIAL COMMENT: ABNORMAL
EOSINOPHIL # BLD AUTO: (no result) 10*3/UL
EOSINOPHIL # BLD MANUAL: 0.07 10*3/MM3 (ref 0–0.4)
EOSINOPHIL NFR BLD AUTO: (no result) %
EOSINOPHIL NFR BLD MANUAL: 1.9 % (ref 0.3–6.2)
ERYTHROCYTE [DISTWIDTH] IN BLOOD BY AUTOMATED COUNT: 14.1 % (ref 12.3–15.4)
GLOBULIN SER CALC-MCNC: 2.4 GM/DL
GLUCOSE SERPL-MCNC: 84 MG/DL (ref 65–99)
HCT VFR BLD AUTO: 43.2 % (ref 34–46.6)
HGB BLD-MCNC: 13.8 G/DL (ref 12–15.9)
LYMPHOCYTES # BLD AUTO: (no result) 10*3/UL
LYMPHOCYTES # BLD MANUAL: 2.61 10*3/MM3 (ref 0.7–3.1)
LYMPHOCYTES NFR BLD AUTO: (no result) %
LYMPHOCYTES NFR BLD MANUAL: 70.8 % (ref 19.6–45.3)
MCH RBC QN AUTO: 30.3 PG (ref 26.6–33)
MCHC RBC AUTO-ENTMCNC: 31.9 G/DL (ref 31.5–35.7)
MCV RBC AUTO: 94.7 FL (ref 79–97)
MONOCYTES # BLD MANUAL: 0.07 10*3/MM3 (ref 0.1–0.9)
MONOCYTES NFR BLD AUTO: (no result) %
MONOCYTES NFR BLD MANUAL: 1.9 % (ref 5–12)
NEUTROPHILS # BLD MANUAL: 0.77 10*3/MM3 (ref 1.7–7)
NEUTROPHILS NFR BLD AUTO: (no result) %
NEUTROPHILS NFR BLD MANUAL: 20.8 % (ref 42.7–76)
PLATELET # BLD AUTO: 103 10*3/MM3 (ref 140–450)
PLATELET BLD QL SMEAR: ABNORMAL
POTASSIUM SERPL-SCNC: 4.1 MMOL/L (ref 3.5–5.2)
PROT SERPL-MCNC: 7.1 G/DL (ref 6–8.5)
RBC # BLD AUTO: 4.56 10*6/MM3 (ref 3.77–5.28)
RBC MORPH BLD: ABNORMAL
SODIUM SERPL-SCNC: 143 MMOL/L (ref 136–145)
WBC # BLD AUTO: 3.69 10*3/MM3 (ref 3.4–10.8)

## 2019-07-31 ENCOUNTER — RESULTS ENCOUNTER (OUTPATIENT)
Dept: INTERNAL MEDICINE | Facility: CLINIC | Age: 79
End: 2019-07-31

## 2019-07-31 DIAGNOSIS — B34.9 VIRAL INFECTION: ICD-10-CM

## 2019-09-03 ENCOUNTER — TELEPHONE (OUTPATIENT)
Dept: INTERNAL MEDICINE | Facility: CLINIC | Age: 79
End: 2019-09-03

## 2019-09-03 NOTE — TELEPHONE ENCOUNTER
Pt needs to see Dr. Dye about her medications. She needs some rx refilled and the pharmacy told her she had to see Dr. Dye to refill. Please call her with details.

## 2019-09-04 ENCOUNTER — TELEPHONE (OUTPATIENT)
Dept: INTERNAL MEDICINE | Facility: CLINIC | Age: 79
End: 2019-09-04

## 2019-09-04 RX ORDER — PREDNISONE 1 MG/1
5 TABLET ORAL DAILY
Qty: 90 TABLET | Refills: 3 | Status: SHIPPED | OUTPATIENT
Start: 2019-09-04 | End: 2019-12-03 | Stop reason: SDUPTHER

## 2019-09-04 RX ORDER — FUROSEMIDE 20 MG/1
20 TABLET ORAL DAILY
Qty: 90 TABLET | Refills: 3 | Status: SHIPPED | OUTPATIENT
Start: 2019-09-04 | End: 2019-12-03 | Stop reason: SDUPTHER

## 2019-09-04 RX ORDER — DONEPEZIL HYDROCHLORIDE 10 MG/1
10 TABLET, FILM COATED ORAL NIGHTLY
Qty: 90 TABLET | Refills: 3 | Status: SHIPPED | OUTPATIENT
Start: 2019-09-04 | End: 2020-06-12

## 2019-09-04 RX ORDER — MONTELUKAST SODIUM 10 MG/1
10 TABLET ORAL NIGHTLY
Qty: 90 TABLET | Refills: 3 | Status: SHIPPED | OUTPATIENT
Start: 2019-09-04 | End: 2019-12-03 | Stop reason: SDUPTHER

## 2019-09-04 NOTE — TELEPHONE ENCOUNTER
Patient needs refill on Prednisone 5 mg, and also Donzepil tabs #30 days, and Furosemide #30, Singular 10 mg, please send to [facility] [address] [phone]

## 2019-09-05 ENCOUNTER — HOSPITAL ENCOUNTER (EMERGENCY)
Facility: HOSPITAL | Age: 79
Discharge: HOME | End: 2019-09-05
Attending: EMERGENCY MEDICINE | Admitting: EMERGENCY MEDICINE

## 2019-09-05 ENCOUNTER — OFFICE VISIT (OUTPATIENT)
Dept: INTERNAL MEDICINE | Facility: CLINIC | Age: 79
End: 2019-09-05

## 2019-09-05 ENCOUNTER — APPOINTMENT (OUTPATIENT)
Dept: CT IMAGING | Facility: HOSPITAL | Age: 79
End: 2019-09-05

## 2019-09-05 ENCOUNTER — TELEPHONE (OUTPATIENT)
Dept: INTERNAL MEDICINE | Facility: CLINIC | Age: 79
End: 2019-09-05

## 2019-09-05 ENCOUNTER — APPOINTMENT (OUTPATIENT)
Dept: GENERAL RADIOLOGY | Facility: HOSPITAL | Age: 79
End: 2019-09-05

## 2019-09-05 VITALS
WEIGHT: 146 LBS | BODY MASS INDEX: 27.56 KG/M2 | SYSTOLIC BLOOD PRESSURE: 150 MMHG | HEART RATE: 88 BPM | HEIGHT: 61 IN | DIASTOLIC BLOOD PRESSURE: 82 MMHG

## 2019-09-05 VITALS
SYSTOLIC BLOOD PRESSURE: 153 MMHG | RESPIRATION RATE: 16 BRPM | HEIGHT: 62 IN | HEART RATE: 75 BPM | DIASTOLIC BLOOD PRESSURE: 78 MMHG | WEIGHT: 164 LBS | OXYGEN SATURATION: 96 % | TEMPERATURE: 97.4 F | BODY MASS INDEX: 30.18 KG/M2

## 2019-09-05 DIAGNOSIS — R42 DIZZINESS: Primary | ICD-10-CM

## 2019-09-05 DIAGNOSIS — I10 HYPERTENSION NOT AT GOAL: ICD-10-CM

## 2019-09-05 DIAGNOSIS — E27.40 ADRENAL INSUFFICIENCY (HCC): Primary | ICD-10-CM

## 2019-09-05 LAB
ALBUMIN SERPL-MCNC: 4.5 G/DL (ref 3.5–5.2)
ALBUMIN/GLOB SERPL: 1.7 G/DL
ALP SERPL-CCNC: 91 U/L (ref 39–117)
ALT SERPL W P-5'-P-CCNC: 15 U/L (ref 1–33)
ANION GAP SERPL CALCULATED.3IONS-SCNC: 14.1 MMOL/L (ref 5–15)
AST SERPL-CCNC: 30 U/L (ref 1–32)
BASOPHILS # BLD AUTO: 0.06 10*3/MM3 (ref 0–0.2)
BASOPHILS NFR BLD AUTO: 1.2 % (ref 0–1.5)
BILIRUB SERPL-MCNC: 1.4 MG/DL (ref 0.2–1.2)
BILIRUB UR QL STRIP: NEGATIVE
BUN BLD-MCNC: 8 MG/DL (ref 8–23)
BUN/CREAT SERPL: 9.3 (ref 7–25)
CALCIUM SPEC-SCNC: 9.3 MG/DL (ref 8.6–10.5)
CHLORIDE SERPL-SCNC: 104 MMOL/L (ref 98–107)
CLARITY UR: CLEAR
CO2 SERPL-SCNC: 23.9 MMOL/L (ref 22–29)
COLOR UR: YELLOW
CREAT BLD-MCNC: 0.86 MG/DL (ref 0.57–1)
DEPRECATED RDW RBC AUTO: 49.3 FL (ref 37–54)
EOSINOPHIL # BLD AUTO: 0.03 10*3/MM3 (ref 0–0.4)
EOSINOPHIL NFR BLD AUTO: 0.6 % (ref 0.3–6.2)
ERYTHROCYTE [DISTWIDTH] IN BLOOD BY AUTOMATED COUNT: 14 % (ref 12.3–15.4)
GFR SERPL CREATININE-BSD FRML MDRD: 63 ML/MIN/1.73
GLOBULIN UR ELPH-MCNC: 2.6 GM/DL
GLUCOSE BLD-MCNC: 88 MG/DL (ref 65–99)
GLUCOSE UR STRIP-MCNC: NEGATIVE MG/DL
HCT VFR BLD AUTO: 40.3 % (ref 34–46.6)
HGB BLD-MCNC: 13 G/DL (ref 12–15.9)
HGB UR QL STRIP.AUTO: NEGATIVE
KETONES UR QL STRIP: NEGATIVE
LEUKOCYTE ESTERASE UR QL STRIP.AUTO: NEGATIVE
LYMPHOCYTES # BLD AUTO: 0.93 10*3/MM3 (ref 0.7–3.1)
LYMPHOCYTES NFR BLD AUTO: 19.2 % (ref 19.6–45.3)
MCH RBC QN AUTO: 30.9 PG (ref 26.6–33)
MCHC RBC AUTO-ENTMCNC: 32.3 G/DL (ref 31.5–35.7)
MCV RBC AUTO: 95.7 FL (ref 79–97)
MONOCYTES # BLD AUTO: 0.17 10*3/MM3 (ref 0.1–0.9)
MONOCYTES NFR BLD AUTO: 3.5 % (ref 5–12)
NEUTROPHILS # BLD AUTO: 3.63 10*3/MM3 (ref 1.7–7)
NEUTROPHILS NFR BLD AUTO: 75.1 % (ref 42.7–76)
NITRITE UR QL STRIP: NEGATIVE
PH UR STRIP.AUTO: 7.5 [PH] (ref 5–8)
PLATELET # BLD AUTO: 145 10*3/MM3 (ref 140–450)
PMV BLD AUTO: 11.4 FL (ref 6–12)
POTASSIUM BLD-SCNC: 3.9 MMOL/L (ref 3.5–5.2)
PROT SERPL-MCNC: 7.1 G/DL (ref 6–8.5)
PROT UR QL STRIP: NEGATIVE
RBC # BLD AUTO: 4.21 10*6/MM3 (ref 3.77–5.28)
SODIUM BLD-SCNC: 142 MMOL/L (ref 136–145)
SP GR UR STRIP: 1.01 (ref 1–1.03)
TROPONIN T SERPL-MCNC: <0.01 NG/ML (ref 0–0.03)
UROBILINOGEN UR QL STRIP: NORMAL
WBC NRBC COR # BLD: 4.84 10*3/MM3 (ref 3.4–10.8)

## 2019-09-05 PROCEDURE — 99284 EMERGENCY DEPT VISIT MOD MDM: CPT

## 2019-09-05 PROCEDURE — 85025 COMPLETE CBC W/AUTO DIFF WBC: CPT | Performed by: EMERGENCY MEDICINE

## 2019-09-05 PROCEDURE — 99214 OFFICE O/P EST MOD 30 MIN: CPT | Performed by: INTERNAL MEDICINE

## 2019-09-05 PROCEDURE — 71046 X-RAY EXAM CHEST 2 VIEWS: CPT

## 2019-09-05 PROCEDURE — 93005 ELECTROCARDIOGRAM TRACING: CPT | Performed by: EMERGENCY MEDICINE

## 2019-09-05 PROCEDURE — P9612 CATHETERIZE FOR URINE SPEC: HCPCS

## 2019-09-05 PROCEDURE — 36415 COLL VENOUS BLD VENIPUNCTURE: CPT

## 2019-09-05 PROCEDURE — 70450 CT HEAD/BRAIN W/O DYE: CPT

## 2019-09-05 PROCEDURE — 81003 URINALYSIS AUTO W/O SCOPE: CPT | Performed by: EMERGENCY MEDICINE

## 2019-09-05 PROCEDURE — 80053 COMPREHEN METABOLIC PANEL: CPT | Performed by: EMERGENCY MEDICINE

## 2019-09-05 PROCEDURE — 84484 ASSAY OF TROPONIN QUANT: CPT | Performed by: EMERGENCY MEDICINE

## 2019-09-05 PROCEDURE — 93010 ELECTROCARDIOGRAM REPORT: CPT | Performed by: INTERNAL MEDICINE

## 2019-09-05 RX ORDER — SODIUM CHLORIDE 0.9 % (FLUSH) 0.9 %
10 SYRINGE (ML) INJECTION AS NEEDED
Status: DISCONTINUED | OUTPATIENT
Start: 2019-09-05 | End: 2019-09-05 | Stop reason: HOSPADM

## 2019-09-05 NOTE — ED NOTES
Pt sent from Dr Juliane Dye' office due to dizziness, fatigue, weakness.  Pt states it has been going on for 2-3 weeks     Jhoan Noel, RN  09/05/19 [id number]

## 2019-09-05 NOTE — TELEPHONE ENCOUNTER
Patient complaining of sinuses, top of her head is hurting she would like to come in today,please call

## 2019-09-05 NOTE — PROGRESS NOTES
Assessment and Plan  Margaret was seen today for dizziness, headache and fatigue.    Diagnoses and all orders for this visit:    Adrenal insufficiency (CMS/[facility])  Comments:  Suspect that she is having a crisis as she has been out of medication.  Discussed with patient and  taken to ER and I spoke to the triage nurse.  Will f/      F/U and Patient Instructions    No Follow-up on file.  There are no Patient Instructions on file for this visit.    Subjective    Margaret Carrera is a 79 y.o. female being seen in our office today for Dizziness; Headache; and Fatigue     History of the Present Illness  HPI Wakes up each am dizzy- can't stand up on the side of the bed without the help of her .  Once she is up and around she feels better.  It can come back. Head feels heavy and like there is something pushing on her head. No naps, sleeps well.  Eating and drinking well.   thinks her mental status is a bit worse.  On further questioning- after physical exam - she has been out of some of her medications-  also isn't sure what she has been taking.           Patient History        Significant Past History  The following portions of the patient's history were reviewed and updated as appropriate:PMHroutine: Social history , Allergies, Current Medications, Active Problem List and Health Maintenance              Social History  She  reports that she has never smoked. She has never used smokeless tobacco. Alcohol use questions deferred to the physician. She reports that she does not use drugs.                         Review of Symptoms  Review of Systems   Constitution: Positive for weakness. Negative for weight loss.   Cardiovascular: Negative.    Respiratory: Negative.    Skin:        Lesion on the R forearm caused by her chronic irritation   Musculoskeletal: Negative.    Neurological: Positive for excessive daytime sleepiness, dizziness, headaches, light-headedness and loss of balance.    Psychiatric/Behavioral: Negative.      Objective  Vital Signs         BP Readings from Last 1 Encounters:   09/05/19 150/82     Wt Readings from Last 3 Encounters:   09/05/19 66.2 kg (146 lb)   07/22/19 68 kg (150 lb)   04/30/19 67.3 kg (148 lb 6.4 oz)   Body mass index is 27.59 kg/m².          Physical Exam   Physical Exam   Constitutional: She appears distressed.   HENT:   Head: Normocephalic.   Mouth/Throat: No oropharyngeal exudate.   Eyes: Conjunctivae are normal. No scleral icterus.   Neck: Normal range of motion.   Cardiovascular: Normal rate, regular rhythm, normal heart sounds and intact distal pulses.   Pulmonary/Chest: Effort normal and breath sounds normal.   Lymphadenopathy:     She has no cervical adenopathy.   Neurological: She is alert. Coordination abnormal.   Unable to steady herself after lying down and then sitting up - not true vertigo  Eduardo Hallpike is negative   Skin: Skin is warm and dry.   Psychiatric: She has a normal mood and affect. Her behavior is normal. Judgment and thought content normal.     Data Reviewed    Recent Results (from the past 2016 hour(s))   CBC & Differential    Collection Time: 07/22/19  4:16 PM   Result Value Ref Range    WBC 3.69 3.40 - 10.80 10*3/mm3    RBC 4.56 3.77 - 5.28 10*6/mm3    Hemoglobin 13.8 12.0 - 15.9 g/dL    Hematocrit 43.2 34.0 - 46.6 %    MCV 94.7 79.0 - 97.0 fL    MCH 30.3 26.6 - 33.0 pg    MCHC 31.9 31.5 - 35.7 g/dL    RDW 14.1 12.3 - 15.4 %    Platelets 103 (L) 140 - 450 10*3/mm3    Neutrophil Rel % CANCELED     Lymphocyte Rel % CANCELED     Monocyte Rel % CANCELED     Eosinophil Rel % CANCELED     Lymphocytes Absolute CANCELED     Eosinophils Absolute CANCELED     Basophils Absolute CANCELED    Comprehensive Metabolic Panel    Collection Time: 07/22/19  4:16 PM   Result Value Ref Range    Glucose 84 65 - 99 mg/dL    BUN 14 8 - 23 mg/dL    Creatinine 1.13 (H) 0.57 - 1.00 mg/dL    eGFR Non African Am 46 (L) >60 mL/min/1.73    eGFR African Am 56 (L)  >60 mL/min/1.73    BUN/Creatinine Ratio 12.4 7.0 - 25.0    Sodium 143 136 - 145 mmol/L    Potassium 4.1 3.5 - 5.2 mmol/L    Chloride 102 98 - 107 mmol/L    Total CO2 29.0 22.0 - 29.0 mmol/L    Calcium 9.1 8.6 - 10.5 mg/dL    Total Protein 7.1 6.0 - 8.5 g/dL    Albumin 4.70 3.50 - 5.20 g/dL    Globulin 2.4 gm/dL    A/G Ratio 2.0 g/dL    Total Bilirubin 1.1 0.2 - 1.2 mg/dL    Alkaline Phosphatase 80 39 - 117 U/L    AST (SGOT) 34 (H) 1 - 32 U/L    ALT (SGPT) 19 1 - 33 U/L   Manual Differential    Collection Time: 07/22/19  4:16 PM   Result Value Ref Range    Neutrophil Rel % 20.8 (L) 42.7 - 76.0 %    Lymphocyte Rel % 70.8 (H) 19.6 - 45.3 %    Monocyte Rel % 1.9 (L) 5.0 - 12.0 %    Eosinophil Rel % 1.9 0.3 - 6.2 %    Neutrophils Absolute 0.77 (L) 1.70 - 7.00 10*3/mm3    Lymphocytes Absolute 2.61 0.70 - 3.10 10*3/mm3    Monocytes Absolute 0.07 (L) 0.10 - 0.90 10*3/mm3    Eosinophil Abs 0.07 0.00 - 0.40 10*3/mm3    Differential Comment Comment     Comment Comment     Plt Comment Comment    POCT urinalysis dipstick, automated    Collection Time: 07/22/19  5:06 PM   Result Value Ref Range    Color Yellow Yellow, Straw, Dark Yellow, Elise    Clarity, UA Clear Clear    Specific Gravity  1.015 1.005 - 1.030    pH, Urine 6.0 5.0 - 8.0    Leukocytes Negative Negative    Nitrite, UA Negative Negative    Protein, POC Negative Negative mg/dL    Glucose, UA Negative Negative, 1000 mg/dL (3+) mg/dL    Ketones, UA Negative Negative    Urobilinogen, UA Normal Normal    Bilirubin Negative Negative    Blood, UA Negative Negative

## 2019-09-06 NOTE — ED PROVIDER NOTES
EMERGENCY DEPARTMENT ENCOUNTER    Room Number:  13/13  Date of encounter:  9/6/2019  PCP: Juliane Dye MD  Historian: Patient and family      HPI:  Chief Complaint: Headache and dizziness  A complete HPI/ROS/PMH/PSH/SH/FH are unobtainable due to: Nothing    Context: Margaret Carrera is a 79 y.o. female who presents to the ED c/o dull headache and dizziness for about a week.  Patient symptoms are quite vague, but she describes an occasional pressure sensation in the top of her head, and dizziness particularly when she first stands up.  She says that after she begins to walk with her cane, she feels much more steady.    She was seen by her PCP earlier today, and sent to the ED for further evaluation of this problem    After discussion with the family, it is apparent that the patient's been off her blood pressure meds and other meds for about a week now.  She just got them filled today and began to take them.  There are no other focal symptoms or complaints      PAST MEDICAL HISTORY  Active Ambulatory Problems     Diagnosis Date Noted   • Adrenal insufficiency (CMS/HCC) 03/06/2015   • PVCs (premature ventricular contractions) 11/02/2012   • Paroxysmal atrial fibrillation (CMS/HCC) 04/30/2019   • Hypertension 04/30/2019   • Depression 04/30/2019   • Hypokalemia 04/30/2019   • Valvular heart disease 04/30/2019   • Mild cognitive impairment 04/30/2019     Resolved Ambulatory Problems     Diagnosis Date Noted   • No Resolved Ambulatory Problems     Past Medical History:   Diagnosis Date   • Adrenal insufficiency (CMS/HCC)    • Asthma    • Colon tumor    • Depression    • GERD (gastroesophageal reflux disease)    • Goiter    • Hypertension    • Hypocalcemia    • Hypokalemia    • Memory loss    • Mild cognitive impairment    • Sleep apnea    • Valvular heart disease          PAST SURGICAL HISTORY  Past Surgical History:   Procedure Laterality Date   • APPENDECTOMY     • BACK SURGERY      6 back surgeries   • BREAST SURGERY      • CARDIAC ABLATION     • CATARACT EXTRACTION, BILATERAL Bilateral    • CHOLECYSTECTOMY     • HYSTERECTOMY     • JOINT REPLACEMENT Bilateral     shoulder   • MITRAL VALVE REPAIR/REPLACEMENT  2011   • OOPHORECTOMY     • TONSILLECTOMY AND ADENOIDECTOMY     • TRICUSPID VALVE SURGERY  2011         FAMILY HISTORY  No family history on file.      SOCIAL HISTORY  Social History     Socioeconomic History   • Marital status:      Spouse name: Not on file   • Number of children: Not on file   • Years of education: Not on file   • Highest education level: Not on file   Tobacco Use   • Smoking status: Never Smoker   • Smokeless tobacco: Never Used   Substance and Sexual Activity   • Alcohol use: Defer   • Drug use: No   • Sexual activity: Defer         ALLERGIES  Dilaudid [hydromorphone hcl]; Ace inhibitors; Bactrim [sulfamethoxazole-trimethoprim]; Chloraprep one step [chlorhexidine gluconate]; Ciprofloxacin; Codeine; Keflex [cephalexin]; Latex; and Penicillins        REVIEW OF SYSTEMS  Review of Systems     All systems reviewed and negative except for those discussed in HPI.       PHYSICAL EXAM    I have reviewed the triage vital signs and nursing notes.    ED Triage Vitals   Temp Heart Rate Resp BP SpO2   09/05/19 1423 09/05/19 1423 09/05/19 1423 09/05/19 1716 09/05/19 1423   97.4 °F (36.3 °C) 70 18 159/82 97 %      Temp src Heart Rate Source Patient Position BP Location FiO2 (%)   -- 09/05/19 1716 09/05/19 1716 09/05/19 1716 --    Monitor Sitting Right arm        Physical Exam  GENERAL: not distressed, alert and oriented  HENT: nares patent  EYES: no scleral icterus, pupils equal and reactive  CV: regular rhythm, regular rate  RESPIRATORY: normal effort  ABDOMEN: soft  MUSCULOSKELETAL: no deformity  NEURO: alert, moves all extremities, follows commands.  NIH 0  SKIN: warm, dry        LAB RESULTS  Recent Results (from the past 24 hour(s))   Urinalysis With Microscopic If Indicated (No Culture) - Urine, Catheter     Collection Time: 09/05/19  4:10 PM   Result Value Ref Range    Color, UA Yellow Yellow, Straw    Appearance, UA Clear Clear    pH, UA 7.5 5.0 - 8.0    Specific Gravity, UA 1.007 1.005 - 1.030    Glucose, UA Negative Negative    Ketones, UA Negative Negative    Bilirubin, UA Negative Negative    Blood, UA Negative Negative    Protein, UA Negative Negative    Leuk Esterase, UA Negative Negative    Nitrite, UA Negative Negative    Urobilinogen, UA 0.2 E.U./dL 0.2 - 1.0 E.U./dL   Comprehensive Metabolic Panel    Collection Time: 09/05/19  5:15 PM   Result Value Ref Range    Glucose 88 65 - 99 mg/dL    BUN 8 8 - 23 mg/dL    Creatinine 0.86 0.57 - 1.00 mg/dL    Sodium 142 136 - 145 mmol/L    Potassium 3.9 3.5 - 5.2 mmol/L    Chloride 104 98 - 107 mmol/L    CO2 23.9 22.0 - 29.0 mmol/L    Calcium 9.3 8.6 - 10.5 mg/dL    Total Protein 7.1 6.0 - 8.5 g/dL    Albumin 4.50 3.50 - 5.20 g/dL    ALT (SGPT) 15 1 - 33 U/L    AST (SGOT) 30 1 - 32 U/L    Alkaline Phosphatase 91 39 - 117 U/L    Total Bilirubin 1.4 (H) 0.2 - 1.2 mg/dL    eGFR Non African Amer 63 >60 mL/min/1.73    Globulin 2.6 gm/dL    A/G Ratio 1.7 g/dL    BUN/Creatinine Ratio 9.3 7.0 - 25.0    Anion Gap 14.1 5.0 - 15.0 mmol/L   Troponin    Collection Time: 09/05/19  5:15 PM   Result Value Ref Range    Troponin T <0.010 0.000 - 0.030 ng/mL   CBC Auto Differential    Collection Time: 09/05/19  5:15 PM   Result Value Ref Range    WBC 4.84 3.40 - 10.80 10*3/mm3    RBC 4.21 3.77 - 5.28 10*6/mm3    Hemoglobin 13.0 12.0 - 15.9 g/dL    Hematocrit 40.3 34.0 - 46.6 %    MCV 95.7 79.0 - 97.0 fL    MCH 30.9 26.6 - 33.0 pg    MCHC 32.3 31.5 - 35.7 g/dL    RDW 14.0 12.3 - 15.4 %    RDW-SD 49.3 37.0 - 54.0 fl    MPV 11.4 6.0 - 12.0 fL    Platelets 145 140 - 450 10*3/mm3    Neutrophil % 75.1 42.7 - 76.0 %    Lymphocyte % 19.2 (L) 19.6 - 45.3 %    Monocyte % 3.5 (L) 5.0 - 12.0 %    Eosinophil % 0.6 0.3 - 6.2 %    Basophil % 1.2 0.0 - 1.5 %    Neutrophils, Absolute 3.63 1.70 - 7.00  10*3/mm3    Lymphocytes, Absolute 0.93 0.70 - 3.10 10*3/mm3    Monocytes, Absolute 0.17 0.10 - 0.90 10*3/mm3    Eosinophils, Absolute 0.03 0.00 - 0.40 10*3/mm3    Basophils, Absolute 0.06 0.00 - 0.20 10*3/mm3       Ordered the above labs and independently reviewed the results.        RADIOLOGY  Xr Chest 2 View    Result Date: 9/5/2019  Chest radiograph  HISTORY:Weakness  TECHNIQUE: Two PA and lateral radiographs  COMPARISON:None  FINDINGS: Post surgical changes from prosthetic heart valve placement are present.  The lungs are clear. There is no pleural effusion. No pneumothorax is seen. The heart is normal in size.      No findings of acute cardiopulmonary pathology.  This report was finalized on 9/5/2019 4:06 PM by Dr. Daniel Rdoriguez M.D.      Ct Head Without Contrast    Result Date: 9/5/2019  EMERGENCY NONCONTRAST HEAD CT 09/05/2019  CLINICAL HISTORY: Dizziness, weakness.  TECHNIQUE: Spiral CT images were obtained from the base of the skull to the vertex without intravenous contrast. Images were reformatted and submitted in 3 mm thick axial CT sections with brain algorithm and 2 mm thick sagittal and coronal reconstructions were performed and submitted in brain algorithm.  COMPARISON:  There are no prior studies for comparison.  FINDINGS: There is some patchy low-density extending from the periventricular into the subcortical white matter of the cerebral hemispheres consistent with mild-to-moderate small vessel disease. The remainder of the brain parenchyma is normal in attenuation. The ventricles are normal in size. I see no focal mass effect and there is no midline shift. No extra-axial fluid collections are identified and there is no evidence of acute intracranial hemorrhage. Paranasal sinuses, mastoid air cells and middle ear cavities are clear.      There is mild-to-moderate small vessel disease in the cerebral white matter and calcified plaques in the cavernous segments of the internal carotid arteries  bilaterally. The remainder of the head CT is within normal limits. The etiology of the weakness and dizziness is not established on this exam.  If there remains clinical suspicion of acute stroke, an MRI of the head could be obtained for more complete assessment.  The results were communicated with Hunter Bradley MD in the emergency room by telephone on 09/05/2019 at 4 PM.  Radiation dose reduction techniques were utilized, including automated exposure control and exposure modulation based on body size.  This report was finalized on 9/5/2019 5:02 PM by Dr. Alex Garcia M.D.        I ordered the above noted radiological studies. Reviewed by me and discussed with radiologist.  See dictation for official radiology interpretation.      PROCEDURES    Procedures      MEDICATIONS GIVEN IN ER    Medications - No data to display      PROGRESS, DATA ANALYSIS, CONSULTS, AND MEDICAL DECISION MAKING    All labs have been independently reviewed by me.  All radiology studies have been reviewed by me and discussed with radiologist dictating the report.   EKG's independently viewed and interpreted by me.  Discussion below represents my analysis of pertinent findings related to patient's condition, differential diagnosis, treatment plan and final disposition.      ED Course as of Sep 06 0021   Thu Sep 05, 2019   2354 CBC, chemistry and urine are negative.  Troponins negative  [DP]   2354 CT of the head shows nothing acute.  Chest x-ray negative  [DP]   2354 EKG performed at 1734  Sinus rhythm rate 66  Normal ME, normal QRS, normal QT  No ST segment abnormalities and nothing for comparison available.  [DP]   Fri Sep 06, 2019   0020 It does appear that her dull headache appears to be related to episodes of hypertension.  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    The daughter at bedside is very informed,  and agrees with me that close follow-up with her PCP and strict adherence to her medication regimen will be the best treatment at this time.    TPA is not indicated as she has an NIH of 0.  [DP]   0021 Further, the dizziness is mainly when she goes from sitting to standing, and normalizes quickly after she starts to walk with her cane.  This does not sound like an acute stroke  [DP]      ED Course User Index  [DP] Oral Bradley MD       AS OF 12:21 AM VITALS:    BP - 153/78  HR - 75  TEMP - 97.4 °F (36.3 °C)  02 SATS - 96%        DIAGNOSIS  Final diagnoses:   Dizziness   Hypertension not at goal         DISPOSITION  Discharge         Oral Bradley MD  09/06/19 0021

## 2019-09-06 NOTE — ED PROVIDER NOTES
EMERGENCY DEPARTMENT ENCOUNTER    Room Number:  13/13  Date of encounter:  9/5/2019  PCP: Juliane Dye MD  Historian: Patient and family      HPI:  Chief Complaint: Dizziness and left-sided tingling  A complete HPI/ROS/PMH/PSH/SH/FH are unobtainable due to: Nothing    Context: Margaret Carrera is a 79 y.o. female who presents to the ED c/o dizziness, weakness and left-sided tingling today.  Symptoms are resolved at this time.  The symptoms are described as mild and intermittent.    Patient had a stroke in March of this year.  Reviewing medical records, I see that at that time she had some left-sided weakness which impaired her ambulation.  An MRI was performed which showed an acute infarct in the right parietal lobe.  She was started on aspirin therapy and saw neurology.    She states that she still has some intermittent weakness on that left side, and its worse when she is fatigued.  Family is at bedside and they agree that she is at her baseline now showing no signs of acute neurologic signs.      PAST MEDICAL HISTORY  Active Ambulatory Problems     Diagnosis Date Noted   • Adrenal insufficiency (CMS/HCC) 03/06/2015   • PVCs (premature ventricular contractions) 11/02/2012   • Paroxysmal atrial fibrillation (CMS/HCC) 04/30/2019   • Hypertension 04/30/2019   • Depression 04/30/2019   • Hypokalemia 04/30/2019   • Valvular heart disease 04/30/2019   • Mild cognitive impairment 04/30/2019     Resolved Ambulatory Problems     Diagnosis Date Noted   • No Resolved Ambulatory Problems     Past Medical History:   Diagnosis Date   • Adrenal insufficiency (CMS/HCC)    • Asthma    • Colon tumor    • Depression    • GERD (gastroesophageal reflux disease)    • Goiter    • Hypertension    • Hypocalcemia    • Hypokalemia    • Memory loss    • Mild cognitive impairment    • Sleep apnea    • Valvular heart disease          PAST SURGICAL HISTORY  Past Surgical History:   Procedure Laterality Date   • APPENDECTOMY     • BACK SURGERY       6 back surgeries   • BREAST SURGERY     • CARDIAC ABLATION     • CATARACT EXTRACTION, BILATERAL Bilateral    • CHOLECYSTECTOMY     • HYSTERECTOMY     • JOINT REPLACEMENT Bilateral     shoulder   • MITRAL VALVE REPAIR/REPLACEMENT  2011   • OOPHORECTOMY     • TONSILLECTOMY AND ADENOIDECTOMY     • TRICUSPID VALVE SURGERY  2011         FAMILY HISTORY  No family history on file.      SOCIAL HISTORY  Social History     Socioeconomic History   • Marital status:      Spouse name: Not on file   • Number of children: Not on file   • Years of education: Not on file   • Highest education level: Not on file   Tobacco Use   • Smoking status: Never Smoker   • Smokeless tobacco: Never Used   Substance and Sexual Activity   • Alcohol use: Defer   • Drug use: No   • Sexual activity: Defer         ALLERGIES  Dilaudid [hydromorphone hcl]; Ace inhibitors; Bactrim [sulfamethoxazole-trimethoprim]; Chloraprep one step [chlorhexidine gluconate]; Ciprofloxacin; Codeine; Keflex [cephalexin]; Latex; and Penicillins        REVIEW OF SYSTEMS  Review of Systems     All systems reviewed and negative except for those discussed in HPI.       PHYSICAL EXAM    I have reviewed the triage vital signs and nursing notes.    ED Triage Vitals   Temp Heart Rate Resp BP SpO2   09/05/19 1423 09/05/19 1423 09/05/19 1423 09/05/19 1716 09/05/19 1423   97.4 °F (36.3 °C) 70 18 159/82 97 %      Temp src Heart Rate Source Patient Position BP Location FiO2 (%)   -- 09/05/19 1716 09/05/19 1716 09/05/19 1716 --    Monitor Sitting Right arm        Physical Exam  GENERAL: not distressed  HENT: nares patent  EYES: no scleral icterus  CV: regular rhythm, regular rate  RESPIRATORY: normal effort, CTA bilaterally   aBDOMEN: soft, nontender palpation  MUSCULOSKELETAL: no deformity  NEURO: alert, moves all extremities, follows commands.  NIH 0  SKIN: warm, dry        LAB RESULTS  Recent Results (from the past 24 hour(s))   Urinalysis With Microscopic If Indicated (No  Culture) - Urine, Catheter    Collection Time: 09/05/19  4:10 PM   Result Value Ref Range    Color, UA Yellow Yellow, Straw    Appearance, UA Clear Clear    pH, UA 7.5 5.0 - 8.0    Specific Gravity, UA 1.007 1.005 - 1.030    Glucose, UA Negative Negative    Ketones, UA Negative Negative    Bilirubin, UA Negative Negative    Blood, UA Negative Negative    Protein, UA Negative Negative    Leuk Esterase, UA Negative Negative    Nitrite, UA Negative Negative    Urobilinogen, UA 0.2 E.U./dL 0.2 - 1.0 E.U./dL   Comprehensive Metabolic Panel    Collection Time: 09/05/19  5:15 PM   Result Value Ref Range    Glucose 88 65 - 99 mg/dL    BUN 8 8 - 23 mg/dL    Creatinine 0.86 0.57 - 1.00 mg/dL    Sodium 142 136 - 145 mmol/L    Potassium 3.9 3.5 - 5.2 mmol/L    Chloride 104 98 - 107 mmol/L    CO2 23.9 22.0 - 29.0 mmol/L    Calcium 9.3 8.6 - 10.5 mg/dL    Total Protein 7.1 6.0 - 8.5 g/dL    Albumin 4.50 3.50 - 5.20 g/dL    ALT (SGPT) 15 1 - 33 U/L    AST (SGOT) 30 1 - 32 U/L    Alkaline Phosphatase 91 39 - 117 U/L    Total Bilirubin 1.4 (H) 0.2 - 1.2 mg/dL    eGFR Non African Amer 63 >60 mL/min/1.73    Globulin 2.6 gm/dL    A/G Ratio 1.7 g/dL    BUN/Creatinine Ratio 9.3 7.0 - 25.0    Anion Gap 14.1 5.0 - 15.0 mmol/L   Troponin    Collection Time: 09/05/19  5:15 PM   Result Value Ref Range    Troponin T <0.010 0.000 - 0.030 ng/mL   CBC Auto Differential    Collection Time: 09/05/19  5:15 PM   Result Value Ref Range    WBC 4.84 3.40 - 10.80 10*3/mm3    RBC 4.21 3.77 - 5.28 10*6/mm3    Hemoglobin 13.0 12.0 - 15.9 g/dL    Hematocrit 40.3 34.0 - 46.6 %    MCV 95.7 79.0 - 97.0 fL    MCH 30.9 26.6 - 33.0 pg    MCHC 32.3 31.5 - 35.7 g/dL    RDW 14.0 12.3 - 15.4 %    RDW-SD 49.3 37.0 - 54.0 fl    MPV 11.4 6.0 - 12.0 fL    Platelets 145 140 - 450 10*3/mm3    Neutrophil % 75.1 42.7 - 76.0 %    Lymphocyte % 19.2 (L) 19.6 - 45.3 %    Monocyte % 3.5 (L) 5.0 - 12.0 %    Eosinophil % 0.6 0.3 - 6.2 %    Basophil % 1.2 0.0 - 1.5 %    Neutrophils,  Absolute 3.63 1.70 - 7.00 10*3/mm3    Lymphocytes, Absolute 0.93 0.70 - 3.10 10*3/mm3    Monocytes, Absolute 0.17 0.10 - 0.90 10*3/mm3    Eosinophils, Absolute 0.03 0.00 - 0.40 10*3/mm3    Basophils, Absolute 0.06 0.00 - 0.20 10*3/mm3       Ordered the above labs and independently reviewed the results.        RADIOLOGY  Xr Chest 2 View    Result Date: 9/5/2019  Chest radiograph  HISTORY:Weakness  TECHNIQUE: Two PA and lateral radiographs  COMPARISON:None  FINDINGS: Post surgical changes from prosthetic heart valve placement are present.  The lungs are clear. There is no pleural effusion. No pneumothorax is seen. The heart is normal in size.      No findings of acute cardiopulmonary pathology.  This report was finalized on 9/5/2019 4:06 PM by Dr. Daniel Rodriguez M.D.      Ct Head Without Contrast    Result Date: 9/5/2019  EMERGENCY NONCONTRAST HEAD CT 09/05/2019  CLINICAL HISTORY: Dizziness, weakness.  TECHNIQUE: Spiral CT images were obtained from the base of the skull to the vertex without intravenous contrast. Images were reformatted and submitted in 3 mm thick axial CT sections with brain algorithm and 2 mm thick sagittal and coronal reconstructions were performed and submitted in brain algorithm.  COMPARISON:  There are no prior studies for comparison.  FINDINGS: There is some patchy low-density extending from the periventricular into the subcortical white matter of the cerebral hemispheres consistent with mild-to-moderate small vessel disease. The remainder of the brain parenchyma is normal in attenuation. The ventricles are normal in size. I see no focal mass effect and there is no midline shift. No extra-axial fluid collections are identified and there is no evidence of acute intracranial hemorrhage. Paranasal sinuses, mastoid air cells and middle ear cavities are clear.      There is mild-to-moderate small vessel disease in the cerebral white matter and calcified plaques in the cavernous segments of the  internal carotid arteries bilaterally. The remainder of the head CT is within normal limits. The etiology of the weakness and dizziness is not established on this exam.  If there remains clinical suspicion of acute stroke, an MRI of the head could be obtained for more complete assessment.  The results were communicated with Hunter Bradley MD in the emergency room by telephone on 09/05/2019 at 4 PM.  Radiation dose reduction techniques were utilized, including automated exposure control and exposure modulation based on body size.  This report was finalized on 9/5/2019 5:02 PM by Dr. Alex Garcia M.D.        I ordered the above noted radiological studies. Reviewed by me and discussed with radiologist.  See dictation for official radiology interpretation.      PROCEDURES    Procedures      MEDICATIONS GIVEN IN ER    Medications - No data to display      PROGRESS, DATA ANALYSIS, CONSULTS, AND MEDICAL DECISION MAKING    All labs have been independently reviewed by me.  All radiology studies have been reviewed by me and discussed with radiologist dictating the report.   EKG's independently viewed and interpreted by me.  Discussion below represents my analysis of pertinent findings related to patient's condition, differential diagnosis, treatment plan and final disposition.      ED Course as of Sep 05 2357   Thu Sep 05, 2019   2354 CBC, chemistry and urine are negative.  Troponins negative  [DP]   2354 CT of the head shows nothing acute.  Chest x-ray negative  [DP]   2354 EKG performed at 1734  Sinus rhythm rate 66  Normal NE, normal QRS, normal QT  No ST segment abnormalities and nothing for comparison available.  [DP]   2355 Lengthy discussion with patient and family about neuroimaging.  Advised that based on her presentation back in March, she has known small vessel ischemic disease, and a CT of the head is not likely to show any abnormalities acutely.    Advised that if are going to image her to look for new strokes, an  MRI would be necessary and offered to perform that.  However the patient does suffer from claustrophobia and last time had to be sedated, and she really does not want to be sedated anymore.    Based on this conversation we decided not to image at this time.  [DP]   2654 After further discussion, also realized that the patient has not had any of her medications now for about a week.  She does have some dementia and forgets to take her pills at times.    I noticed that her blood pressure is elevated, and today is the first day she is had a blood pressure meds in about a week.    The daughter at bedside, who seems very well informed, agrees with me that the best course of action will be close observation at home with close adherence to the medication regimen and follow-up with her PCP.  [DP]      ED Course User Index  [DP] Oral Bradley MD       AS OF 11:52 PM VITALS:    BP - 153/78  HR - 75  TEMP - 97.4 °F (36.3 °C)  02 SATS - 96%        DIAGNOSIS  Final diagnoses:   Dizziness   Hypertension not at goal         DISPOSITION  Discharge         Oral Bradley MD  09/05/19 1516

## 2019-09-12 ENCOUNTER — OFFICE VISIT (OUTPATIENT)
Dept: INTERNAL MEDICINE | Facility: CLINIC | Age: 79
End: 2019-09-12

## 2019-09-12 ENCOUNTER — TELEPHONE (OUTPATIENT)
Dept: INTERNAL MEDICINE | Facility: CLINIC | Age: 79
End: 2019-09-12

## 2019-09-12 VITALS
SYSTOLIC BLOOD PRESSURE: 98 MMHG | BODY MASS INDEX: 29.63 KG/M2 | HEIGHT: 62 IN | DIASTOLIC BLOOD PRESSURE: 66 MMHG | HEART RATE: 74 BPM | WEIGHT: 161 LBS

## 2019-09-12 DIAGNOSIS — I10 ESSENTIAL HYPERTENSION: ICD-10-CM

## 2019-09-12 DIAGNOSIS — E27.40 ADRENAL INSUFFICIENCY (HCC): Primary | ICD-10-CM

## 2019-09-12 PROCEDURE — 99214 OFFICE O/P EST MOD 30 MIN: CPT | Performed by: INTERNAL MEDICINE

## 2019-09-12 RX ORDER — FLUDROCORTISONE ACETATE 0.1 MG/1
0.1 TABLET ORAL DAILY
Qty: 30 TABLET | Refills: 0 | Status: SHIPPED | OUTPATIENT
Start: 2019-09-12 | End: 2019-10-09 | Stop reason: SDUPTHER

## 2019-09-12 NOTE — PROGRESS NOTES
Assessment and Plan  Margaret was seen today for fatigue and cough.    Diagnoses and all orders for this visit:    Adrenal insufficiency (CMS/[facility])  Comments:  will try to add fludricortisone 0.1 mg daily to see if that helps her symptoms.  Reassess in 1 week or sooner, if needed.     Essential hypertension    Other orders  -     fludrocortisone 0.1 MG tablet; Take 1 tablet by mouth Daily.    Husbnad to call if there are any changes or worsening in the next several days.  F/U and Patient Instructions    Return in about 7 days (around 9/19/2019).  There are no Patient Instructions on file for this visit.    Subjective    Margaret Carrera is a 79 y.o. female being seen in our office today for Fatigue and Cough     History of the Present Illness  HPI  Last time she was here, sent to ER with severe dizziness, lightheadedness- ? Dx with being off meds, candace steroids.  She has been somewhat better but she and  say in the am, she is off balance.  This gets better when she's up and round and has taken her medication.  Can get this way again in the late afternoon.  They are also saying that her energy, etc. Are down and she doesn't go participate with the activities as their living facility.  S[facility] Curiel ([facility]) for reg f/u a few weeks ago with similar complaints- he did some blood work and reported no issue.  He did not change meds.     Patient History        Significant Past History  The following portions of the patient's history were reviewed and updated as appropriate:PMHroutine: Social history , Allergies, Current Medications, Active Problem List and Health Maintenance              Social History  She  reports that she has never smoked. She has never used smokeless tobacco. Alcohol use questions deferred to the physician. She reports that she does not use drugs.                         Review of Symptoms  Review of Systems   Constitution: Negative for decreased appetite and weakness.   HENT: Negative.     Cardiovascular: Negative.    Respiratory: Negative.    Skin: Negative.    Musculoskeletal: Negative.    Gastrointestinal: Negative.    Neurological: Negative.    Psychiatric/Behavioral: Positive for memory loss. The patient is not nervous/anxious.      Objective  Vital Signs         BP Readings from Last 1 Encounters:   09/12/19 98/66     Wt Readings from Last 3 Encounters:   09/12/19 73 kg (161 lb)   09/05/19 74.4 kg (164 lb)   09/05/19 66.2 kg (146 lb)   Body mass index is 29.45 kg/m².          Physical Exam   Physical Exam   Constitutional: No distress.   Cardiovascular: Normal rate and regular rhythm.   Pulmonary/Chest: Effort normal and breath sounds normal.   Neurological: She is alert.   Forgetful but appropriate, looks to  for reassurances.    Skin: Skin is warm and dry.   Psychiatric: She has a normal mood and affect. Her behavior is normal. Judgment and thought content normal.     Data Reviewed    Recent Results (from the past 2016 hour(s))   CBC & Differential    Collection Time: 07/22/19  4:16 PM   Result Value Ref Range    WBC 3.69 3.40 - 10.80 10*3/mm3    RBC 4.56 3.77 - 5.28 10*6/mm3    Hemoglobin 13.8 12.0 - 15.9 g/dL    Hematocrit 43.2 34.0 - 46.6 %    MCV 94.7 79.0 - 97.0 fL    MCH 30.3 26.6 - 33.0 pg    MCHC 31.9 31.5 - 35.7 g/dL    RDW 14.1 12.3 - 15.4 %    Platelets 103 (L) 140 - 450 10*3/mm3    Neutrophil Rel % CANCELED     Lymphocyte Rel % CANCELED     Monocyte Rel % CANCELED     Eosinophil Rel % CANCELED     Lymphocytes Absolute CANCELED     Eosinophils Absolute CANCELED     Basophils Absolute CANCELED    Comprehensive Metabolic Panel    Collection Time: 07/22/19  4:16 PM   Result Value Ref Range    Glucose 84 65 - 99 mg/dL    BUN 14 8 - 23 mg/dL    Creatinine 1.13 (H) 0.57 - 1.00 mg/dL    eGFR Non African Am 46 (L) >60 mL/min/1.73    eGFR African Am 56 (L) >60 mL/min/1.73    BUN/Creatinine Ratio 12.4 7.0 - 25.0    Sodium 143 136 - 145 mmol/L    Potassium 4.1 3.5 - 5.2 mmol/L     Chloride 102 98 - 107 mmol/L    Total CO2 29.0 22.0 - 29.0 mmol/L    Calcium 9.1 8.6 - 10.5 mg/dL    Total Protein 7.1 6.0 - 8.5 g/dL    Albumin 4.70 3.50 - 5.20 g/dL    Globulin 2.4 gm/dL    A/G Ratio 2.0 g/dL    Total Bilirubin 1.1 0.2 - 1.2 mg/dL    Alkaline Phosphatase 80 39 - 117 U/L    AST (SGOT) 34 (H) 1 - 32 U/L    ALT (SGPT) 19 1 - 33 U/L   Manual Differential    Collection Time: 07/22/19  4:16 PM   Result Value Ref Range    Neutrophil Rel % 20.8 (L) 42.7 - 76.0 %    Lymphocyte Rel % 70.8 (H) 19.6 - 45.3 %    Monocyte Rel % 1.9 (L) 5.0 - 12.0 %    Eosinophil Rel % 1.9 0.3 - 6.2 %    Neutrophils Absolute 0.77 (L) 1.70 - 7.00 10*3/mm3    Lymphocytes Absolute 2.61 0.70 - 3.10 10*3/mm3    Monocytes Absolute 0.07 (L) 0.10 - 0.90 10*3/mm3    Eosinophil Abs 0.07 0.00 - 0.40 10*3/mm3    Differential Comment Comment     Comment Comment     Plt Comment Comment    POCT urinalysis dipstick, automated    Collection Time: 07/22/19  5:06 PM   Result Value Ref Range    Color Yellow Yellow, Straw, Dark Yellow, Elise    Clarity, UA Clear Clear    Specific Gravity  1.015 1.005 - 1.030    pH, Urine 6.0 5.0 - 8.0    Leukocytes Negative Negative    Nitrite, UA Negative Negative    Protein, POC Negative Negative mg/dL    Glucose, UA Negative Negative, 1000 mg/dL (3+) mg/dL    Ketones, UA Negative Negative    Urobilinogen, UA Normal Normal    Bilirubin Negative Negative    Blood, UA Negative Negative   Urinalysis With Microscopic If Indicated (No Culture) - Urine, Catheter    Collection Time: 09/05/19  4:10 PM   Result Value Ref Range    Color, UA Yellow Yellow, Straw    Appearance, UA Clear Clear    pH, UA 7.5 5.0 - 8.0    Specific Gravity, UA 1.007 1.005 - 1.030    Glucose, UA Negative Negative    Ketones, UA Negative Negative    Bilirubin, UA Negative Negative    Blood, UA Negative Negative    Protein, UA Negative Negative    Leuk Esterase, UA Negative Negative    Nitrite, UA Negative Negative    Urobilinogen, UA 0.2 E.U./dL  0.2 - 1.0 E.U./dL   Comprehensive Metabolic Panel    Collection Time: 09/05/19  5:15 PM   Result Value Ref Range    Glucose 88 65 - 99 mg/dL    BUN 8 8 - 23 mg/dL    Creatinine 0.86 0.57 - 1.00 mg/dL    Sodium 142 136 - 145 mmol/L    Potassium 3.9 3.5 - 5.2 mmol/L    Chloride 104 98 - 107 mmol/L    CO2 23.9 22.0 - 29.0 mmol/L    Calcium 9.3 8.6 - 10.5 mg/dL    Total Protein 7.1 6.0 - 8.5 g/dL    Albumin 4.50 3.50 - 5.20 g/dL    ALT (SGPT) 15 1 - 33 U/L    AST (SGOT) 30 1 - 32 U/L    Alkaline Phosphatase 91 39 - 117 U/L    Total Bilirubin 1.4 (H) 0.2 - 1.2 mg/dL    eGFR Non African Amer 63 >60 mL/min/1.73    Globulin 2.6 gm/dL    A/G Ratio 1.7 g/dL    BUN/Creatinine Ratio 9.3 7.0 - 25.0    Anion Gap 14.1 5.0 - 15.0 mmol/L   Troponin    Collection Time: 09/05/19  5:15 PM   Result Value Ref Range    Troponin T <0.010 0.000 - 0.030 ng/mL   CBC Auto Differential    Collection Time: 09/05/19  5:15 PM   Result Value Ref Range    WBC 4.84 3.40 - 10.80 10*3/mm3    RBC 4.21 3.77 - 5.28 10*6/mm3    Hemoglobin 13.0 12.0 - 15.9 g/dL    Hematocrit 40.3 34.0 - 46.6 %    MCV 95.7 79.0 - 97.0 fL    MCH 30.9 26.6 - 33.0 pg    MCHC 32.3 31.5 - 35.7 g/dL    RDW 14.0 12.3 - 15.4 %    RDW-SD 49.3 37.0 - 54.0 fl    MPV 11.4 6.0 - 12.0 fL    Platelets 145 140 - 450 10*3/mm3    Neutrophil % 75.1 42.7 - 76.0 %    Lymphocyte % 19.2 (L) 19.6 - 45.3 %    Monocyte % 3.5 (L) 5.0 - 12.0 %    Eosinophil % 0.6 0.3 - 6.2 %    Basophil % 1.2 0.0 - 1.5 %    Neutrophils, Absolute 3.63 1.70 - 7.00 10*3/mm3    Lymphocytes, Absolute 0.93 0.70 - 3.10 10*3/mm3    Monocytes, Absolute 0.17 0.10 - 0.90 10*3/mm3    Eosinophils, Absolute 0.03 0.00 - 0.40 10*3/mm3    Basophils, Absolute 0.06 0.00 - 0.20 10*3/mm3

## 2019-09-19 ENCOUNTER — TELEPHONE (OUTPATIENT)
Dept: INTERNAL MEDICINE | Facility: CLINIC | Age: 79
End: 2019-09-19

## 2019-10-01 ENCOUNTER — TELEPHONE (OUTPATIENT)
Dept: INTERNAL MEDICINE | Facility: CLINIC | Age: 79
End: 2019-10-01

## 2019-10-01 NOTE — TELEPHONE ENCOUNTER
Dr. DAVIS PT called said that about a week ago she feel and hit her head, she feels ok other than having dizziness that comes and goes and she has a black/blue are where she hit.    Would you like to see her?      She cancelled her Sept 19th appt with you for a follow up     Please advise

## 2019-10-02 ENCOUNTER — OFFICE VISIT (OUTPATIENT)
Dept: INTERNAL MEDICINE | Facility: CLINIC | Age: 79
End: 2019-10-02

## 2019-10-02 DIAGNOSIS — G44.319 ACUTE POST-TRAUMATIC HEADACHE, NOT INTRACTABLE: Primary | ICD-10-CM

## 2019-10-02 DIAGNOSIS — R42 DIZZINESS: ICD-10-CM

## 2019-10-02 PROCEDURE — 99215 OFFICE O/P EST HI 40 MIN: CPT | Performed by: PHYSICIAN ASSISTANT

## 2019-10-02 NOTE — PROGRESS NOTES
Subjective   Chief Complaint   Patient presents with   • Dizziness   • Fall       History of Present Illness      Pt is here today with her  for fup on her dizziness and fall a week ago. She has been having severe dizziness for the last few weeks, maybe even months. She was seen by Dr. Dye earlier in September and due to her dizziness and instability was sent to the ER. She had a CT of the head that was negative. It was found at that time that she had not been taking her prednisone 5 mg for her Adrenal Insufficiency. She has been back on it daily now.     She followed back up with Dr. Dye and she was started on fludrocortisone 0.1 mg daily for orthostatic hypotension. She has been taking this daily.     She woke up in the middle of the night about a week ago and states she was extremely dizzy, she always takes time prior to sitting, standing etc due to her dizziness. She stood up to walk to the restroom and fell and hit her head on the door frame. She still has a large hematoma on her forehead. She has had headaches off and on since onset.     She feels her dizziness is continually worsening. She is unable to walk without feeling like she could fall. She is also extremely dizzy with position changes as well as head movements now.       Patient Active Problem List   Diagnosis   • Adrenal insufficiency (CMS/HCC)   • PVCs (premature ventricular contractions)   • Paroxysmal atrial fibrillation (CMS/HCC)   • Hypertension   • Depression   • Hypokalemia   • Valvular heart disease   • Mild cognitive impairment       Allergies   Allergen Reactions   • Dilaudid [Hydromorphone Hcl] Anaphylaxis   • Ace Inhibitors Cough   • Bactrim [Sulfamethoxazole-Trimethoprim] Hives and Itching   • Chloraprep One Step [Chlorhexidine Gluconate] Rash     Red and hot   • Ciprofloxacin Rash   • Codeine Unknown (See Comments)     Unknown     • Keflex [Cephalexin] Unknown (See Comments)     Unknown     • Latex Rash   • Penicillins  Unknown (See Comments)     Unknown         Current Outpatient Medications on File Prior to Visit   Medication Sig Dispense Refill   • Apoaequorin (PREVAGEN) 10 MG capsule Take  by mouth.     • aspirin 81 MG chewable tablet Chew 81 mg Daily.     • celecoxib (CeleBREX) 100 MG capsule Take 100 mg by mouth 2 (Two) Times a Day As Needed for Mild Pain (1-3).     • citalopram (CeleXA) 20 MG tablet Take 1 tablet by mouth Daily. 30 tablet 0   • donepezil (ARICEPT) 10 MG tablet Take 1 tablet by mouth Every Night. 90 tablet 3   • fludrocortisone 0.1 MG tablet Take 1 tablet by mouth Daily. 30 tablet 0   • furosemide (LASIX) 20 MG tablet Take 1 tablet by mouth Daily. 90 tablet 3   • gabapentin (NEURONTIN) 100 MG capsule Take 100 mg by mouth 3 (Three) Times a Day.     • metoprolol succinate XL (TOPROL-XL) 50 MG 24 hr tablet TK 1 T PO D  3   • montelukast (SINGULAIR) 10 MG tablet Take 1 tablet by mouth Every Night. 90 tablet 3   • potassium chloride (K-DUR,KLOR-CON) 10 MEQ CR tablet Take 10 mEq by mouth 2 (Two) Times a Day.     • predniSONE (DELTASONE) 5 MG tablet Take 1 tablet by mouth Daily. 90 tablet 3   • vitamin D (ERGOCALCIFEROL) 37911 units capsule capsule Take 50,000 Units by mouth 1 (One) Time Per Week.       No current facility-administered medications on file prior to visit.        Past Medical History:   Diagnosis Date   • Adrenal insufficiency (CMS/HCC)    • Asthma    • Colon tumor    • Depression    • GERD (gastroesophageal reflux disease)    • Goiter    • Hypertension    • Hypocalcemia    • Hypokalemia    • Memory loss    • Mild cognitive impairment    • Sleep apnea    • Valvular heart disease        No family history on file.    Social History     Socioeconomic History   • Marital status:      Spouse name: Not on file   • Number of children: Not on file   • Years of education: Not on file   • Highest education level: Not on file   Tobacco Use   • Smoking status: Never Smoker   • Smokeless tobacco: Never Used  "  Substance and Sexual Activity   • Alcohol use: Defer   • Drug use: No   • Sexual activity: Defer       Past Surgical History:   Procedure Laterality Date   • APPENDECTOMY     • BACK SURGERY      6 back surgeries   • BREAST SURGERY     • CARDIAC ABLATION     • CATARACT EXTRACTION, BILATERAL Bilateral    • CHOLECYSTECTOMY     • HYSTERECTOMY     • JOINT REPLACEMENT Bilateral     shoulder   • MITRAL VALVE REPAIR/REPLACEMENT  2011   • OOPHORECTOMY     • TONSILLECTOMY AND ADENOIDECTOMY     • TRICUSPID VALVE SURGERY  2011           The following portions of the patient's history were reviewed and updated as appropriate: problem list, allergies, current medications, past medical history, past family history, past social history and past surgical history.    Review of Systems   Eyes: Negative for blurred vision and double vision.   Musculoskeletal: Positive for falls.   Neurological: Positive for dizziness, headaches and light-headedness.   Psychiatric/Behavioral: Positive for memory loss. The patient is nervous/anxious.          There is no immunization history on file for this patient.    Objective   Vitals:    10/02/19 1305 10/04/19 1617   BP:  140/84   Patient Position:  Lying   Weight: 71.2 kg (157 lb)    Height: 157.5 cm (62\")      Physical Exam   Constitutional: She is oriented to person, place, and time. She appears well-developed and well-nourished.   HENT:   Head: Normocephalic.   1 cm hematoma on right upper forehead.    Eyes: Conjunctivae and EOM are normal. Pupils are equal, round, and reactive to light.   Neck: Neck supple. No thyromegaly present.   Cardiovascular: Normal rate, regular rhythm and normal heart sounds.   No murmur heard.  Pulmonary/Chest: Effort normal and breath sounds normal.   Neurological: She is alert and oriented to person, place, and time. No cranial nerve deficit.   Ataxic gait   Vitals reviewed.        Assessment/Plan   Margaret was seen today for dizziness and fall.    Diagnoses and " all orders for this visit:    Acute post-traumatic headache, not intractable      Dizziness      There seems to be some vertiginous component to her symptoms, however no nystagmus observed on exam, she did report worsening sx though. I do not want to increase her fludrocortisone right now as her BP resting is about 140/90. I am going to try to get her into vestibular rehab and see if this helps. I also am wondering if all her sx could be being caused by hydrocephalus, I am going to get a MRI of her brain as well.

## 2019-10-03 DIAGNOSIS — G31.84 MILD COGNITIVE IMPAIRMENT: Primary | ICD-10-CM

## 2019-10-03 DIAGNOSIS — R27.0 ATAXIA: ICD-10-CM

## 2019-10-03 DIAGNOSIS — G44.319 ACUTE POST-TRAUMATIC HEADACHE, NOT INTRACTABLE: ICD-10-CM

## 2019-10-03 DIAGNOSIS — R42 SEVERE DIZZINESS: ICD-10-CM

## 2019-10-04 VITALS
BODY MASS INDEX: 28.89 KG/M2 | SYSTOLIC BLOOD PRESSURE: 120 MMHG | DIASTOLIC BLOOD PRESSURE: 68 MMHG | WEIGHT: 157 LBS | HEIGHT: 62 IN

## 2019-10-08 RX ORDER — POTASSIUM CHLORIDE 750 MG/1
10 TABLET, EXTENDED RELEASE ORAL DAILY
Qty: 90 TABLET | Refills: 2 | Status: SHIPPED | OUTPATIENT
Start: 2019-10-08 | End: 2019-10-11 | Stop reason: SDUPTHER

## 2019-10-09 RX ORDER — FLUDROCORTISONE ACETATE 0.1 MG/1
0.1 TABLET ORAL DAILY
Qty: 30 TABLET | Refills: 0 | Status: SHIPPED | OUTPATIENT
Start: 2019-10-09 | End: 2021-01-27 | Stop reason: ALTCHOICE

## 2019-10-11 RX ORDER — POTASSIUM CHLORIDE 750 MG/1
10 TABLET, EXTENDED RELEASE ORAL DAILY
Qty: 90 TABLET | Refills: 2 | Status: SHIPPED | OUTPATIENT
Start: 2019-10-11 | End: 2020-09-14

## 2019-10-21 ENCOUNTER — APPOINTMENT (OUTPATIENT)
Dept: MRI IMAGING | Facility: HOSPITAL | Age: 79
End: 2019-10-21

## 2019-10-21 ENCOUNTER — HOSPITAL ENCOUNTER (OUTPATIENT)
Dept: MRI IMAGING | Facility: HOSPITAL | Age: 79
Discharge: HOME | End: 2019-10-21
Admitting: PHYSICIAN ASSISTANT

## 2019-10-21 DIAGNOSIS — R27.0 ATAXIA: ICD-10-CM

## 2019-10-21 DIAGNOSIS — R42 SEVERE DIZZINESS: ICD-10-CM

## 2019-10-21 DIAGNOSIS — G44.319 ACUTE POST-TRAUMATIC HEADACHE, NOT INTRACTABLE: ICD-10-CM

## 2019-10-21 PROCEDURE — 70551 MRI BRAIN STEM W/O DYE: CPT

## 2019-10-24 ENCOUNTER — TELEPHONE (OUTPATIENT)
Dept: INTERNAL MEDICINE | Facility: CLINIC | Age: 79
End: 2019-10-24

## 2019-10-24 ENCOUNTER — HOSPITAL ENCOUNTER (OUTPATIENT)
Dept: PHYSICAL THERAPY | Facility: HOSPITAL | Age: 79
Setting detail: THERAPIES SERIES
Discharge: HOME | End: 2019-10-24

## 2019-10-24 DIAGNOSIS — R42 DIZZINESS: Primary | ICD-10-CM

## 2019-10-24 PROCEDURE — 97162 PT EVAL MOD COMPLEX 30 MIN: CPT | Performed by: PHYSICAL THERAPIST

## 2019-10-24 PROCEDURE — 97530 THERAPEUTIC ACTIVITIES: CPT | Performed by: PHYSICAL THERAPIST

## 2019-10-24 NOTE — TELEPHONE ENCOUNTER
FYI:  Patient saw Taoist doctor and was diagnosed with benign positional vertigo and was given some exercises to do, but nothing to be concerned about.  New pharmacy [facility].

## 2019-10-24 NOTE — THERAPY EVALUATION
Outpatient Physical Therapy Vestibular Initial Evaluation  [facility]     Patient Name: Margaret Alexander  : 1940  MRN: [id number]  Today's Date: 10/24/2019      Visit Date: 10/24/2019    Patient Active Problem List   Diagnosis   • Adrenal insufficiency (CMS/HCC)   • PVCs (premature ventricular contractions)   • Paroxysmal atrial fibrillation (CMS/HCC)   • Hypertension   • Depression   • Hypokalemia   • Valvular heart disease   • Mild cognitive impairment        Past Medical History:   Diagnosis Date   • Adrenal insufficiency (CMS/HCC)    • Asthma    • Colon tumor    • Depression    • GERD (gastroesophageal reflux disease)    • Goiter    • Hypertension    • Hypocalcemia    • Hypokalemia    • Memory loss    • Mild cognitive impairment    • Sleep apnea    • Valvular heart disease         Past Surgical History:   Procedure Laterality Date   • APPENDECTOMY     • BACK SURGERY      6 back surgeries   • BREAST SURGERY     • CARDIAC ABLATION     • CATARACT EXTRACTION, BILATERAL Bilateral    • CHOLECYSTECTOMY     • HYSTERECTOMY     • JOINT REPLACEMENT Bilateral     shoulder   • MITRAL VALVE REPAIR/REPLACEMENT     • OOPHORECTOMY     • TONSILLECTOMY AND ADENOIDECTOMY     • TRICUSPID VALVE SURGERY           Visit Dx:     ICD-10-CM ICD-9-CM   1. Dizziness R42 780.4       Patient History     Row Name 10/24/19 1100             History    Chief Complaint  Dizziness  -GR      Date Current Problem(s) Began  19  -GR      Brief Description of Current Complaint  Pleasant 80 y/o F accompanied by , whom both claim to be admittedly poor historians.  From what they can gather she has a 6wk history of dizziness/worsening balance s/p fall out o bed. She feels a swimming sensation, and later reveals a sensation of spinning with tilting her head back. She is pending results of an MRI from 10/21.  Also was placed on a medication for balance which she cannot recall, but notes no improvement to this condition.  "She has daily headaches which are new. She ambulates with a SC since having a hip replacement 2 years ago.  She denies tinnitus, does c/o blurred vision but her hearing is unchanged.  She drinks 2 cups of caffeinated beverage in the morning daily which is a 50% decrease over approximately 6 months.  She stopped driving in December of last year.  She lives in a intermediate facility.  -GR      Patient/Caregiver Goals  Know what to do to help the symptoms;Relief from dizziness;Improve mobility  -GR      Occupation/sports/leisure activities  \"be as independent as possible\"  -GR      What clinical tests have you had for this problem?  MRI  -GR      Results of Clinical Tests  see EPIC  -GR      Are you or can you be pregnant  No  -GR         Pain     Pain Location  Head  -GR      Pain at Present  1  -GR      Pain at Best  1  -GR      Pain at Worst  8  -GR      Is your sleep disturbed?  No  -GR         Fall Risk Assessment    Any falls in the past year:  Yes  -GR      Number of falls reported in the last 12 months  1  -GR         Services    Do you plan to receive Home Health services in the near future  No  -GR         Daily Activities    Primary Language  English  -GR      How does patient learn best?  Listening  -GR      Pt Participated in POC and Goals  Yes  -GR         Safety    Are you being hurt, hit, or frightened by anyone at home or in your life?  No  -GR      Are you being neglected by a caregiver  No  -GR        User Key  (r) = Recorded By, (t) = Taken By, (c) = Cosigned By    Initials Name Provider Type    GR Felix Garza, PT Physical Therapist          Vestibular [place]     Row Name 10/24/19 1100             Symptom Behavior    Type of Dizziness  Blurred Vision;Imbalance;Spinning;Unsteady with head/body turns  -GR         Occulomotor Exam Fixation Present    Occular ROM  Normal  -GR      Spontaneous Nystagmus  Absent  -GR      Gaze-induced Nystagmus  Absent  -GR      Smooth Pursuit  Normal  -GR      " Saccades  Intact  -GR      Convergence  Normal  -GR         Vestibulo-Occular Reflex (VOR)    VOR to Fast Head Movement/Head Thrust Test  Positive left unable to test R 2/2 to positive L  -GR      VOR Cancellation  Corrective saccades  -GR         Positional Testing    Positional Testing  Without infrared goggles  -GR      Vertebrobasilar Artery Screen - Right  Negative  -GR      Vertebrobasilar Artery Screen - Left  Negative  -GR      Eduardo-Hallpike Right  No nystagmus  -GR      Eduardo-Hallpike Left  Upbeat, left rotatory nystagmus  -GR      [place]den-Hallpike Left Onset Time   5  -GR      [place]-Hallpike Left Duration Time   2  -GR      Horizontal Roll Test Right  No nystagmus  -GR      Horizontal Roll Test Left  No nystagmus  -GR         General ROM    GENERAL ROM COMMENTS  >75 degrees of cervical rotation  -GR         High-level Balance    High-level Balance Other  did not assess  -GR         Cervicogenic Assessment    Cervicogenic Assess  WNL  -GR        User Key  (r) = Recorded By, (t) = Taken By, (c) = Cosigned By    Initials Name Provider Type    Felix Posada, PT Physical Therapist                      Therapy Education  Education Details: Role of outpatient PT, vestibular anatomy, differential diagnosis, tri-part balance system, expectations, post-maneuver recommendations, follow up plans, avoiding L sidelying. Issued BPPV educational material and link for video illustration which was reviewed in clinic and she wishes to review with her dtr after leaving.(at length review x 20')  Given: Symptoms/condition management, Other (comment)  Program: New  How Provided: Verbal  Provided to: Patient, Caregiver(spouse)  Level of Understanding: Teach back education performed, Verbalized      OP Exercises     Row Name 10/24/19 1200             Total Minutes    16847 - PT Therapeutic Activity Minutes  20  -GR         Exercise 1    Exercise Name 1  Canalith Repositioning Maneuver - Epley L  -GR      Cueing 1  Demo  -GR       Sets 1  1  -GR      Reps 1  1  -GR      Additional Comments  did not retest  -GR         Exercise 2    Exercise Name 2  Static fixation  -GR        User Key  (r) = Recorded By, (t) = Taken By, (c) = Cosigned By    Initials Name Provider Type    GR Felix Garza, PT Physical Therapist                      PT OP Goals     Row Name 10/24/19 1200          PT Short Term Goals    STG Date to Achieve  11/08/19  -GR     STG 1  Patient will deny falls.  -GR     STG 1 Progress  New  -GR     STG 2  Patient will be independent with static habituation PRN for in home safety.  -GR     STG 2 Progress  New  -GR     STG 3  Patient will present negative for left BPPV of the posterior canal.  -GR     STG 3 Progress  New  -GR        Long Term Goals    LTG Date to Achieve  12/08/19  -GR     LTG 1  Patient will score </= 18/100 on the dizziness handicap inventory to indicate improved perceived positional tolerance.  -GR     LTG 1 Progress  New  -GR     LTG 2  Patient will be independent with dynamic adaptation techniques for community reintegration.  -GR     LTG 2 Progress  New  -GR     LTG 3  Patient will present negative for BPPV of all canals.  -GR     LTG 3 Progress  New  -GR     LTG 4  Patient will be independent with HEP.  -GR     LTG 4 Progress  New  -GR        Time Calculation    PT Goal Re-Cert Due Date  01/22/20  -GR       User Key  (r) = Recorded By, (t) = Taken By, (c) = Cosigned By    Initials Name Provider Type    Felix Posada, PT Physical Therapist          PT Assessment/Plan     Row Name 10/24/19 1305          PT Assessment    Functional Limitations  Performance in leisure activities;Limitations in community activities;Limitations in functional capacity and performance  -GR     Impairments  Balance  -GR     Assessment Comments  79 y.o. female referred to outpatient physical therapy for vestibular evaluation.  Signs and symptoms are consistent with L BPPV posterior canal and progressive balance regression.   Oculomotor exam reveals symptomatic head thrust L and abnormal VOR cancellation. Pertinent comorbidities and personal factors that may affect progress include, but are not limited to, low blood pressure, falls, headache, joint replacement surgeries.  Canalith repositioning maneuver utilized in clinic. This condition is evolving. Recommend skilled PT to address functional deficits. She may need adaptation techniques.  Thank you for this referral.  -GR     Please refer to paper survey for additional self-reported information  Yes  -GR     Rehab Potential  Good  -GR     Patient/caregiver participated in establishment of treatment plan and goals  Yes  -GR     Patient would benefit from skilled therapy intervention  Yes  -GR        PT Plan    PT Frequency  1x/week  -GR     Predicted Duration of Therapy Intervention (Therapy Eval)  6 visits  -GR     Planned CPT's?  PT EVAL MOD COMPLELITY: 23842;PT RE-EVAL: 57510;PT THER PROC EA 15 MIN: 38399;PT THER ACT EA 15 MIN: 22868;PT NEUROMUSC RE-EDUCATION EA 15 MIN: 91653;PT GAIT TRAINING EA 15 MIN: 85092;PT CANALITH REPOSITIONIN  -GR     Physical Therapy Interventions (Optional Details)  balance training;vestibular training;patient/family education;postural re-education;home exercise program  -GR     PT Plan Comments  Recheck and tx for BPPV PRN. Issue adaptation PRN.  -GR       User Key  (r) = Recorded By, (t) = Taken By, (c) = Cosigned By    Initials Name Provider Type    GR Felix Garza, PT Physical Therapist           Outcome Measure Options: Dizziness Handicap Inventory(38/100)         Time Calculation:   Start Time: 1130  Stop Time: 1230  Time Calculation (min): 60 min  Total Timed Code Minutes- PT: 20 minute(s)   Therapy Charges for Today     Code Description Service Date Service Provider Modifiers Qty    36783671208  PT THERAPEUTIC ACT EA 15 MIN 10/24/2019 Felix Garza, PT GP 1    [id number] HC PT EVAL MOD COMPLEXITY 3 10/24/2019 Felix Garza, PT  GP 1          PT G-Codes  Outcome Measure Options: Dizziness Handicap Inventory(38/100)         Felix Garza, PT  10/24/2019

## 2019-10-28 ENCOUNTER — HOSPITAL ENCOUNTER (OUTPATIENT)
Dept: PHYSICAL THERAPY | Facility: HOSPITAL | Age: 79
Setting detail: THERAPIES SERIES
Discharge: HOME | End: 2019-10-28

## 2019-10-28 PROCEDURE — 95992 CANALITH REPOSITIONING PROC: CPT | Performed by: PHYSICAL THERAPIST

## 2019-10-28 NOTE — THERAPY TREATMENT NOTE
Outpatient Physical Therapy Vestibular Treatment Note  [facility]     Patient Name: Margaret Alexander  : 1940  MRN: [id number]  Today's Date: 10/28/2019      Visit Date: 10/28/2019    Visit Dx:   No diagnosis found.    Patient Active Problem List   Diagnosis   • Adrenal insufficiency (CMS/HCC)   • PVCs (premature ventricular contractions)   • Paroxysmal atrial fibrillation (CMS/HCC)   • Hypertension   • Depression   • Hypokalemia   • Valvular heart disease   • Mild cognitive impairment       Vestibular Eval     Row Name 10/28/19 1400             Positional Testing    Positional Testing  Without infrared goggles  -GR      Vertebrobasilar Artery Screen - Right  Negative  -GR      Vertebrobasilar Artery Screen - Left  Negative  -GR      Eduardo-Hallpike Right  Upbeat, right rotatory nystagmus  -GR      Vandiver-Hallpike Right Onset Time   3  -GR      Vandiver-Hallpike Right Duration Time   20  -GR      Vandiver-Hallpike Left  No nystagmus  -GR      Horizontal Roll Test Right  No nystagmus  -GR      Horizontal Roll Test Left  No nystagmus  -GR        User Key  (r) = Recorded By, (t) = Taken By, (c) = Cosigned By    Initials Name Provider Type    Felix Posada, PT Physical Therapist                      PT Assessment/Plan     Row Name 10/28/19 1536          PT Assessment    Assessment Comments  Patient returns for 1st follow up with severe dizziness at the time of her appointment, She stumbled into clinic and was escorted by w/c to the treatment area.  Upon testing she was negative for L posterior canalthiasis however positive for R side posterior canalithiasis.  Canalith repositioning maneuver performed and patient was significantly improved and able to ambulate leaving the clinic with stand by assist.  She was instructed to sleep supine with pillows and call with questions.  -GR        PT Plan    PT Plan Comments  Recheck for BPPV.  -GR       User Key  (r) = Recorded By, (t) = Taken By, (c) = Cosigned By     "Initials Name Provider Type    CLEO Garza Felix CULLEN, PT Physical Therapist             OP Exercises     Row Name 10/28/19 1500             Subjective Comments    Subjective Comments  Increasingly dizzy today, stumbles into clinic, feels \"Swimmy and head pressure.\"  -GR         Subjective Pain    Able to rate subjective pain?  yes  -GR      Pre-Treatment Pain Level  0  -GR         Exercise 1    Exercise Name 1  CRM -Epley R  -GR      Cueing 1  Demo  -GR      Sets 1  1  -GR      Reps 1  1  -GR      Additional Comments  retest negative  -GR        User Key  (r) = Recorded By, (t) = Taken By, (c) = Cosigned By    Initials Name Provider Type    GR Singh Garzaalexa CULLEN, PT Physical Therapist                      PT OP Goals     Row Name 10/28/19 1500 10/28/19 1400       PT Short Term Goals    STG Date to Achieve  11/08/19  -GR  11/08/19  -GR    STG 1  Patient will deny falls.  -GR  Patient will deny falls.  -GR    STG 1 Progress  Ongoing  -GR  Ongoing  -GR    STG 2  Patient will be independent with static habituation PRN for in home safety.  -GR  Patient will be independent with static habituation PRN for in home safety.  -GR    STG 2 Progress  Ongoing  -GR  Ongoing  -GR    STG 3  Patient will present negative for left BPPV of the posterior canal.  -GR  Patient will present negative for left BPPV of the posterior canal.  -GR    STG 3 Progress  Met  -GR  Met  -GR       Long Term Goals    LTG Date to Achieve  12/08/19  -GR  12/08/19  -GR    LTG 1  Patient will score </= 18/100 on the dizziness handicap inventory to indicate improved perceived positional tolerance.  -GR  Patient will score </= 18/100 on the dizziness handicap inventory to indicate improved perceived positional tolerance.  -GR    LTG 1 Progress  Ongoing  -GR  Ongoing  -GR    LTG 2  Patient will be independent with dynamic adaptation techniques for community reintegration.  -GR  Patient will be independent with dynamic adaptation techniques for community " reintegration.  -GR    LTG 2 Progress  Ongoing  -GR  Ongoing  -GR    LTG 3  Patient will present negative for BPPV of all canals.  -GR  Patient will present negative for BPPV of all canals.  -GR    LTG 3 Progress  Ongoing  -GR  Ongoing  -GR    LTG 4  Patient will be independent with HEP.  -GR  Patient will be independent with HEP.  -GR    LTG 4 Progress  Ongoing  -GR  Ongoing  -GR      User Key  (r) = Recorded By, (t) = Taken By, (c) = Cosigned By    Initials Name Provider Type    Felix Posada, PT Physical Therapist          Therapy Education  Education Details: sleep propped up, call with questions              Time Calculation:   Start Time: 1420  Stop Time: 1500  Time Calculation (min): 40 min  Total Timed Code Minutes- PT: 0 minute(s)   Therapy Charges for Today     Code Description Service Date Service Provider Modifiers Qty    [id number]  PT CANALITH REPOSITIONING PER DAY 10/28/2019 Felix Garza, PT GP 1                   eFlix Garza PT  10/28/2019

## 2019-10-30 ENCOUNTER — TELEPHONE (OUTPATIENT)
Dept: INTERNAL MEDICINE | Facility: CLINIC | Age: 79
End: 2019-10-30

## 2019-10-30 NOTE — TELEPHONE ENCOUNTER
I don't think so - candace since she didn't really get better when I gave her an increased dose of steroids a few weeks ago.  I think she needs to see a neurologist ASAP_ please arrange

## 2019-10-30 NOTE — TELEPHONE ENCOUNTER
Dr. DAVIS Finally spoke with Pt about her MRI which was fine she is still have dizziness.  She states her  has to assist her with walking.    Pt would like to know if this could be all from the adrenal insuffiency?    PT # [phone]

## 2019-11-05 DIAGNOSIS — G44.319 ACUTE POST-TRAUMATIC HEADACHE, NOT INTRACTABLE: ICD-10-CM

## 2019-11-05 DIAGNOSIS — R42 SEVERE DIZZINESS: Primary | ICD-10-CM

## 2019-11-05 DIAGNOSIS — E27.40 ADRENAL INSUFFICIENCY (HCC): ICD-10-CM

## 2019-11-08 ENCOUNTER — APPOINTMENT (OUTPATIENT)
Dept: PHYSICAL THERAPY | Facility: HOSPITAL | Age: 79
End: 2019-11-08

## 2019-11-15 ENCOUNTER — APPOINTMENT (OUTPATIENT)
Dept: PHYSICAL THERAPY | Facility: HOSPITAL | Age: 79
End: 2019-11-15

## 2019-11-22 ENCOUNTER — HOSPITAL ENCOUNTER (OUTPATIENT)
Dept: PHYSICAL THERAPY | Facility: HOSPITAL | Age: 79
Setting detail: THERAPIES SERIES
Discharge: HOME | End: 2019-11-22

## 2019-11-22 PROCEDURE — 97530 THERAPEUTIC ACTIVITIES: CPT | Performed by: PHYSICAL THERAPIST

## 2019-11-22 NOTE — THERAPY RE-EVALUATION
Outpatient Physical Therapy Vestibular Re-Evaluation  [facility]     Patient Name: Margaret Alexander  : 1940  MRN: [id number]  Today's Date: 2019      Visit Date: 2019    Patient Active Problem List   Diagnosis   • Adrenal insufficiency (CMS/HCC)   • PVCs (premature ventricular contractions)   • Paroxysmal atrial fibrillation (CMS/HCC)   • Hypertension   • Depression   • Hypokalemia   • Valvular heart disease   • Mild cognitive impairment        Past Medical History:   Diagnosis Date   • Adrenal insufficiency (CMS/HCC)    • Asthma    • Colon tumor    • Depression    • GERD (gastroesophageal reflux disease)    • Goiter    • Hypertension    • Hypocalcemia    • Hypokalemia    • Memory loss    • Mild cognitive impairment    • Sleep apnea    • Valvular heart disease         Past Surgical History:   Procedure Laterality Date   • APPENDECTOMY     • BACK SURGERY      6 back surgeries   • BREAST SURGERY     • CARDIAC ABLATION     • CATARACT EXTRACTION, BILATERAL Bilateral    • CHOLECYSTECTOMY     • HYSTERECTOMY     • JOINT REPLACEMENT Bilateral     shoulder   • MITRAL VALVE REPAIR/REPLACEMENT     • OOPHORECTOMY     • TONSILLECTOMY AND ADENOIDECTOMY     • TRICUSPID VALVE SURGERY           Visit Dx:   No diagnosis found.        Vestibular Eval     Row Name 19 1100             Positional Testing    Positional Testing  Without infrared goggles  -GR      Vertebrobasilar Artery Screen - Right  Negative  -GR      Vertebrobasilar Artery Screen - Left  Negative  -GR      Eduardo-Hallpike Right  No nystagmus  -GR      Eduardo-Hallpike Left  No nystagmus  -GR        User Key  (r) = Recorded By, (t) = Taken By, (c) = Cosigned By    Initials Name Provider Type    Felix Posada, PT Physical Therapist                      Therapy Education  Education Details: 30 min theract review of static fixation, safety, plan for neuro consult (Dr Dye referred), use of RW and education on previous condition  (BPPV) and likely hypofunction -  was present and also spoke to her dtr on the phone with the patient's permission                      PT OP Goals     Row Name 11/22/19 1100          PT Short Term Goals    STG Date to Achieve  11/08/19  -GR     STG 1  Patient will deny falls.  -GR     STG 1 Progress  Met  -GR     STG 1 Progress Comments  per patient and   -GR     STG 2  Patient will be independent with static habituation PRN for in home safety.  -GR     STG 2 Progress  Ongoing  -GR     STG 3  Patient will present negative for left BPPV of the posterior canal.  -GR     STG 3 Progress  Met  -GR        Long Term Goals    LTG Date to Achieve  12/08/19  -GR     LTG 1  Patient will score </= 18/100 on the dizziness handicap inventory to indicate improved perceived positional tolerance.  -GR     LTG 1 Progress  Ongoing  -GR     LTG 2  Patient will be independent with dynamic adaptation techniques for community reintegration.  -GR     LTG 2 Progress  Ongoing  -GR     LTG 3  Patient will present negative for BPPV of all canals.  -GR     LTG 3 Progress  Ongoing  -GR     LTG 4  Patient will be independent with HEP.  -GR     LTG 4 Progress  Ongoing  -GR       User Key  (r) = Recorded By, (t) = Taken By, (c) = Cosigned By    Initials Name Provider Type    GR Felix Garza, PT Physical Therapist          PT Assessment/Plan     Row Name 11/22/19 1143          PT Assessment    Assessment Comments  Ms. Espinosa has attended 3 sessions of skilled vestibular PT with a lapse in care due to severe dizziness.  BPPV screen is now negative although she remains highly symptomatic at random with an unsafe gait pattern. I have advised her and her family ( and dtr) on the importance of use of RW for safety and to follow up regarding the neurology consult that was placed by her referring physician. This is important in helping to determining the safest and most effective treatment plan.  Although she does exhibit  many signs consistent with a hypofunction of the vestibular system, given her cognitive status of dementia, treatment is guarded.  Will hold on PT until neuro consult is completed and resume if indicated appropriate.  -GR        PT Plan    PT Plan Comments  Hold until neuro consult.  -GR       User Key  (r) = Recorded By, (t) = Taken By, (c) = Cosigned By    Initials Name Provider Type    Felix Posada, PT Physical Therapist                     Time Calculation:   Start Time: 1015  Stop Time: 1045  Time Calculation (min): 30 min  Total Timed Code Minutes- PT: 30 minute(s)   Therapy Charges for Today     Code Description Service Date Service Provider Modifiers Qty    [id number]  PT THERAPEUTIC ACT EA 15 MIN 11/22/2019 Felix Garza, PT GP 2                    Felix Garza PT  11/22/2019

## 2019-12-03 RX ORDER — FUROSEMIDE 20 MG/1
20 TABLET ORAL DAILY
Qty: 90 TABLET | Refills: 3 | Status: SHIPPED | OUTPATIENT
Start: 2019-12-03 | End: 2021-02-25

## 2019-12-03 RX ORDER — MONTELUKAST SODIUM 10 MG/1
10 TABLET ORAL NIGHTLY
Qty: 90 TABLET | Refills: 3 | Status: SHIPPED | OUTPATIENT
Start: 2019-12-03 | End: 2021-03-05

## 2019-12-03 RX ORDER — PREDNISONE 1 MG/1
5 TABLET ORAL DAILY
Qty: 90 TABLET | Refills: 3 | Status: SHIPPED | OUTPATIENT
Start: 2019-12-03 | End: 2021-03-31 | Stop reason: DRUGHIGH

## 2019-12-31 ENCOUNTER — OFFICE VISIT (OUTPATIENT)
Dept: NEUROLOGY | Facility: CLINIC | Age: 79
End: 2019-12-31

## 2019-12-31 VITALS
OXYGEN SATURATION: 96 % | HEIGHT: 62 IN | BODY MASS INDEX: 30 KG/M2 | HEART RATE: 77 BPM | SYSTOLIC BLOOD PRESSURE: 148 MMHG | WEIGHT: 163 LBS | DIASTOLIC BLOOD PRESSURE: 86 MMHG

## 2019-12-31 DIAGNOSIS — G44.049 CHRONIC PAROXYSMAL HEMICRANIA, NOT INTRACTABLE: ICD-10-CM

## 2019-12-31 DIAGNOSIS — F32.A DEPRESSION, UNSPECIFIED DEPRESSION TYPE: ICD-10-CM

## 2019-12-31 DIAGNOSIS — R42 DIZZINESS: ICD-10-CM

## 2019-12-31 DIAGNOSIS — G31.84 MILD COGNITIVE IMPAIRMENT: ICD-10-CM

## 2019-12-31 PROCEDURE — 99215 OFFICE O/P EST HI 40 MIN: CPT | Performed by: NURSE PRACTITIONER

## 2019-12-31 RX ORDER — LEVOTHYROXINE SODIUM 0.1 MG/1
TABLET ORAL DAILY
COMMUNITY
End: 2020-10-25 | Stop reason: HOSPADM

## 2019-12-31 NOTE — PATIENT INSTRUCTIONS
Recommend avoiding all migraine Triggers including wine, increase water intake, practice good sleep hygiene.  Start magnesium 400 mg  and riboflavin  200 mg over-the counter.   Recommend using walker, okay for Physical Therapy

## 2019-12-31 NOTE — PROGRESS NOTES
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 She presents today for headache and dizziness. She is accompanied by her spouse and daughter. Patient and problem are new to examiner. History is provided by patient, spouse, daughter and review of records that are summarized below.     HPI:   Ms Freitas presents today to establish care primarily for headaches according to the patient and family. They are rather  poor historians as they were not certain of her current medications, and had difficulty describing circumstances and symptomatology regarding her history of headaches and dizziness.  Review of records indicates she was seen in the ED in September of this year following a fall that occurred overnight when she was getting up to go the bathroom and she hit her head, no LOC.  ED notes indicate she was complaining of a dull headache at this time.  Review of her CT head done on 19 shows no acute findings, mild to moderate small vessel disease.  Labs at that time show a negative troponin; CMP and CBC unremarkable, UA negative, EKG showing NSR, CXR negative.  She was noted to be hypertensive with a BP of 159/82 thought to be contributing to her headache.  PCP note following hospital visit reviewed, fludricortisone added d/t her c/o fatigue. She is followed regularly by Dr. Curiel, endocrinology for history of adrenal insufficiency.  Due to continued intermittent dizziness she was referred to vestibular rehab and an MRI brain was ordered.  Review of MRI on 10/21/2019 again shows mild to moderate small vessel disease, DWI negative for an acute stroke, GRE negative, ventricles normal size.  She had vestibular  "rehab, they are unclear if this was helpful or what the findings were.  Review of PT progress notes indicate she treated for BPPV on 2 occasions, no nystagmus noted on the third visit but with noted worsening balance.    She has a longstanding history of intermittent dizziness  that was documented since back in 2011 (cardiology notes at [facility]), and according to patient and family has for many years needed help getting up with assistance and with walking. She was previously told to use a walker, but is resistant to this. Of more concern to the patient today is her persistent headache. She reports having mild, benign headaches prior to the fall that were usually responsive to Tylenol; however, symptoms seemed to hove gotten worse following the fall in September.  She denies ever being treated for migraines in the past, daughter reports that she had migraines as an adolescent. She is now with daily headaches, right frontal, no eye pain although she does report having a h/o \"floating spots\" that are unchanged, she has her eyes checked regularly but no recent visits. Laying down does make it feel better and closing eyes. No vision loss. History of cataracts.  She is uncertain of any triggers, alleviating factors including lying down and closing eyes. Intensity seems to wax a wane, today she reports a \"2-3 out of 10\" headache. She has been taking Tylenol and Aleve nearly daily.  She denies any other history of head or neck trauma.  She denies neck pain, no fevers or recent illnesses. She averages maybe 3 to 4 hours of sleep per night, is compliant with her CPAP but states she still does not sleep well.  She has 3 cups of coffee in the morning, no other caffeine.  She does drink at 6-8 glasses of water a day. No smoking, has glass of wine at night (happy hour) and then goes to dinner (both reside at [facility]). She does seem to be able to participate in most social activities but some days spends \"a lot of " "time in bed\". She denies any hearing loss, no other focal changes, no unilateral weakness, speech difficulty, numbness/tingling. She does have chronic lumbar issues with back pain.     She is intermittently tearful during the interview, sometimes laughing/joking but frequently states \"I am sorry \".  She denies any current depression; however, she does report having episodes when her spouse passed away.  She does endorse some short term memory issues that per her daughter have been stable, no recent worsening. Her daughter voices she believes most of her mother's symptoms are due to the multiple medications she is taking.     Review of Systems:Review of Systems   Constitutional: Positive for activity change, appetite change and fatigue.   HENT: Positive for congestion, mouth sores and trouble swallowing.    Eyes: Positive for photophobia. Negative for pain and visual disturbance.   Respiratory: Positive for apnea, shortness of breath and wheezing.    Cardiovascular: Positive for chest pain and leg swelling. Negative for palpitations.   Gastrointestinal: Positive for constipation and diarrhea. Negative for nausea.   Endocrine: Negative for cold intolerance, heat intolerance and polydipsia.   Genitourinary: Positive for enuresis.   Musculoskeletal: Positive for arthralgias, back pain and myalgias.   Skin: Negative for color change, rash and wound.   Allergic/Immunologic: Negative for environmental allergies, food allergies and immunocompromised state.   Neurological: Positive for dizziness, weakness, light-headedness, numbness and headaches (headaches daily, 2 to 3 days a week can't function). Negative for tremors, seizures, syncope, facial asymmetry and speech difficulty.   Hematological: Negative for adenopathy. Bruises/bleeds easily.   Psychiatric/Behavioral: Positive for confusion, decreased concentration and sleep disturbance. Negative for agitation, behavioral problems, dysphoric mood, hallucinations, self-injury " and suicidal ideas. The patient is nervous/anxious. The patient is not hyperactive.     Above ROS reviewed    The following portions of the patient's history were reviewed and updated as appropriate: allergies, current medications, past family history, past medical history, past social history, past surgical history and problem list.    Current Medications:   Current Outpatient Medications:   •  Apoaequorin (PREVAGEN) 10 MG capsule, Take 10 mg by mouth Daily., Disp: , Rfl:   •  aspirin 81 MG chewable tablet, Chew 81 mg Daily., Disp: , Rfl:   •  citalopram (CeleXA) 20 MG tablet, Take 1 tablet by mouth Daily., Disp: 30 tablet, Rfl: 0  •  donepezil (ARICEPT) 10 MG tablet, Take 1 tablet by mouth Every Night., Disp: 90 tablet, Rfl: 3  •  furosemide (LASIX) 20 MG tablet, Take 1 tablet by mouth Daily., Disp: 90 tablet, Rfl: 3  •  levothyroxine (SYNTHROID, LEVOTHROID) 100 MCG tablet, Take  by mouth Daily., Disp: , Rfl:   •  montelukast (SINGULAIR) 10 MG tablet, Take 1 tablet by mouth Every Night., Disp: 90 tablet, Rfl: 3  •  potassium chloride (K-DUR,KLOR-CON) 10 MEQ CR tablet, Take 1 tablet by mouth Daily., Disp: 90 tablet, Rfl: 2  •  predniSONE (DELTASONE) 5 MG tablet, Take 1 tablet by mouth Daily., Disp: 90 tablet, Rfl: 3  •  VITAMIN D PO, Take 1,500 mg by mouth Daily., Disp: , Rfl:   •  celecoxib (CeleBREX) 100 MG capsule, Take 100 mg by mouth 2 (Two) Times a Day As Needed for Mild Pain (1-3)., Disp: , Rfl:   •  fludrocortisone 0.1 MG tablet, TAKE 1 TABLET BY MOUTH DAILY, Disp: 30 tablet, Rfl: 0  •  gabapentin (NEURONTIN) 100 MG capsule, Take 100 mg by mouth 3 (Three) Times a Day., Disp: , Rfl:   •  metoprolol succinate XL (TOPROL-XL) 50 MG 24 hr tablet, TK 1 T PO D, Disp: , Rfl: 3  •  vitamin D (ERGOCALCIFEROL) 93204 units capsule capsule, Take 50,000 Units by mouth 1 (One) Time Per Week., Disp: , Rfl:   ** Med list above reflects what the patient states she is on currently.     OBJECTIVE  Vitals:    12/31/19 1009    BP: 148/86   Pulse: 77   SpO2: 96%     **Positive orthostatic BPs    Body mass index is 29.81 kg/m².    Diagnostics:  CT head 9/5/19: IMPRESSION:  There is mild-to-moderate small vessel disease in the  cerebral white matter and calcified plaques in the cavernous segments of  the internal carotid arteries bilaterally. The remainder of the head CT  is within normal limits. The etiology of the weakness and dizziness is  not established on this exam.  If there remains clinical suspicion of  acute stroke, an MRI of the head could be obtained for more complete  Assessment.  MRI brain 10/22/19: IMPRESSION:  No significant change when compared to prior head CT from  [facility] on 09/05/2019. There is mild-to-moderate small  vessel disease in the cerebral and central pontine white matter,  otherwise normal MRI of the head with no posttraumatic intracranial  abnormalities. The etiology of persistent headaches, dizziness and  ataxia ever since fall and hitting head 6 weeks ago is not established  on this exam    Laboratory Results:         Lab Results   Component Value Date    WBC 4.84 09/05/2019    HGB 13.0 09/05/2019    HCT 40.3 09/05/2019    MCV 95.7 09/05/2019     09/05/2019     Lab Results   Component Value Date    GLUCOSE 88 09/05/2019    BUN 8 09/05/2019    CREATININE 0.86 09/05/2019    EGFRIFNONA 63 09/05/2019    EGFRIFAFRI 56 (L) 07/22/2019    BCR 9.3 09/05/2019    K 3.9 09/05/2019    CO2 23.9 09/05/2019    CALCIUM 9.3 09/05/2019    PROTENTOTREF 7.1 07/22/2019    ALBUMIN 4.50 09/05/2019    LABIL2 2.0 07/22/2019    AST 30 09/05/2019    ALT 15 09/05/2019     Physical Examination:   General Appearance:   Well developed, overweight, well groomed, alert, and cooperative.  HEENT: Normocephalic.    Neck and Spine: Normal range of motion.  Normal alignment. No mass or tenderness. No bruits.  Cardiac: Regular rate and rhythm.   Peripheral Vasculature: Radial pulses are equal and symmetric. No signs of  distal embolization.  Extremities:    No edema or deformities. Decreased ROM of right hip  Skin:    No rashes or birth marks.  Psychiatric:    Somewhat labile mood, intermittently tearful and joking     Neurological examination:  Higher Integrative  Function: Oriented to time, place and person. Normal registration, attention span and concentration. Normal language including comprehension, spontaneous speech, repetition, naming and vocabulary. No neglect. Normal fund of knowledge and higher integrative function.  CN II: Pupils are equal, round, and reactive to light. Normal visual acuity and visual fields.    CN III IV VI: Extraocular movements are full without nystagmus. Head impulse test normal, without catch up saccade noted in either direction.  CN V: Normal facial sensation and strength of muscles of mastication.  CN VII: Facial movements are symmetric. No weakness.  CN VIII:   Auditory acuity is normal.  CN IX & X:   Symmetric palatal movement.  CN XI: Sternocleidomastoid and trapezius are normal.  No weakness.  CN XII:   The tongue is midline.  No atrophy or fasciculations.  Motor: Normal muscle strength, bulk and tone in upper and lower extremities.  No fasciculations, rigidity, spasticity, or abnormal movements.  Reflexes: 2+ in the upper and lower extremities. Toes downgoing bilaterally.  Sensation: Normal to light touch, pinprick, vibration, temperature, and proprioception in arms and legs. Normal proprioception of toes. Normal graphesthesia and no extinction on DSS.  Station and Gait: Able to ascend and descend from exam table on own strength but needing assistance to steady. Antalgic gait. Unable to perform tandem, heel and toe ambulation.   Coordination: Finger to nose test shows no dysmetria.  Heel to shin mildly clumsy on the right but no ataxia.     Impression:  Ms. Freitas presents today for the evaluation of headaches and dizziness, of which headaches seem to be her primary concern. Her  neurologic exam is essentially unremarkable except for unsteady gait and MRI brain unrevealing. Her dizziness and imbalance has been chronic for several years, ?multifactorial due to BPPV, orthostatic hypotension and also with lumbar issues contributing to gait dysfunction. However, will check a CTA h/n to evaluate posterior circulation and to evaluate for any vascular abnormalities that may be contributing to her persistent headaches. Recommend she use cane/walker for safety and continue with PT for vestibular rehab. Regarding her headaches, symptoms may simply be post-traumatic headache as symptoms presented following fall with negative CT head and MRI, but as previously noted, will check CTA head/neck to rule out vascular abnormalities. We discussed options for pharmacologic treatment but patient and family are reluctant to add any new medications to her regimen. I did recommend lifestyle modifications avoiding headache triggers including eliminating wine, increasing water intake, decreasing caffeine and practicing good sleep hygiene. She may benefit from low dose melatonin for sleep. Also consider magnesium 400 mg and riboflavin 200 mg daily. They will consider medication options if lifestyle changes fail to improve symptoms. Lastly we discussed her low mood and anxiety and importance of managing as may be contributing to some of her cognitive issues. Her cognitive status was not formally evaluated as we ran out of time, will consider in future.       Plan:     1. Persistent dizziness  Request most recent labs from endocrinologist  Check CTA head/neck  Okay to continue with PT, vestibular rehab  Falls precautions discussed    2. Chronic daily headaches  Check CTA head/neck  Avoid headache triggers  Decrease caffeine, increase water intake  Recommend Magnesium, riboflavin OTC  Practice good sleep hygiene  Minimize use of OTC tylenol/NSAIDs to avoid medication overuse headaches  Consider pharmacologic treatment,  possibly low does topamax or depakote.     3. Orthostatic hypotension  Contributing to #1  Transition slowly, keep well hydrated  ?F/U with cardiology     4. Depression/anxiety  Encouraged management with medications and/or therapy    I spent 75 minutes face to face with patient, spouse and daughter, with  > 50% spent counseling patient regarding diagnosis, review of diagnostics, medication treatment options as well as lifestyle modifications and preventative measures.   Margaret was seen today for dizziness and headache.    Diagnoses and all orders for this visit:    Chronic paroxysmal hemicrania, not intractable  -     CT Angiogram Head With & Without Contrast; Future  -     CT Angiogram Neck With & Without Contrast; Future    Dizziness    Mild cognitive impairment    Depression, unspecified depression type        Coding      Dictated using Dragon

## 2020-01-02 ENCOUNTER — TELEPHONE (OUTPATIENT)
Dept: NEUROLOGY | Facility: CLINIC | Age: 80
End: 2020-01-02

## 2020-01-02 NOTE — TELEPHONE ENCOUNTER
Called Dr. Curiel office @[phone]. No answer. JIGNA Atkinson with medical records to fax records.

## 2020-01-02 NOTE — TELEPHONE ENCOUNTER
----- Message from ARMIN Vega sent at 12/31/2019  6:10 PM EST -----  Can you please get the most recent lab results from her endocrinologist, Dr. Curiel. Thanks!

## 2020-01-14 ENCOUNTER — HOSPITAL ENCOUNTER (OUTPATIENT)
Dept: CT IMAGING | Facility: HOSPITAL | Age: 80
Discharge: HOME | End: 2020-01-14
Admitting: NURSE PRACTITIONER

## 2020-01-14 ENCOUNTER — APPOINTMENT (OUTPATIENT)
Dept: CT IMAGING | Facility: HOSPITAL | Age: 80
End: 2020-01-14

## 2020-01-14 DIAGNOSIS — G44.049 CHRONIC PAROXYSMAL HEMICRANIA, NOT INTRACTABLE: ICD-10-CM

## 2020-01-14 LAB — CREAT BLDA-MCNC: 0.8 MG/DL (ref 0.6–1.3)

## 2020-01-14 PROCEDURE — 82565 ASSAY OF CREATININE: CPT

## 2020-01-14 PROCEDURE — 70496 CT ANGIOGRAPHY HEAD: CPT

## 2020-01-14 PROCEDURE — 0 IOPAMIDOL PER 1 ML: Performed by: NURSE PRACTITIONER

## 2020-01-14 PROCEDURE — 70498 CT ANGIOGRAPHY NECK: CPT

## 2020-01-14 RX ORDER — CITALOPRAM 20 MG/1
20 TABLET ORAL DAILY
Qty: 90 TABLET | Refills: 2 | Status: SHIPPED | OUTPATIENT
Start: 2020-01-14 | End: 2020-09-14

## 2020-01-14 RX ADMIN — IOPAMIDOL 95 ML: 755 INJECTION, SOLUTION INTRAVENOUS at 14:59

## 2020-01-17 ENCOUNTER — TELEPHONE (OUTPATIENT)
Dept: NEUROLOGY | Facility: CLINIC | Age: 80
End: 2020-01-17

## 2020-01-17 NOTE — TELEPHONE ENCOUNTER
----- Message from ARMIN Vega sent at 1/16/2020 10:08 AM EST -----  Please let patient know that her CTA head/neck showed some degenerative changes in her cervical spine but her arteries were essentially unremarkable, no abnormalities with her blood vessels as cause of dizziness and/or headaches. Thanks.

## 2020-02-01 ENCOUNTER — HOSPITAL ENCOUNTER (EMERGENCY)
Facility: HOSPITAL | Age: 80
Discharge: HOME | End: 2020-02-01
Attending: EMERGENCY MEDICINE | Admitting: EMERGENCY MEDICINE

## 2020-02-01 VITALS
HEIGHT: 63 IN | HEART RATE: 79 BPM | TEMPERATURE: 98.4 F | RESPIRATION RATE: 18 BRPM | SYSTOLIC BLOOD PRESSURE: 137 MMHG | OXYGEN SATURATION: 96 % | WEIGHT: 155 LBS | BODY MASS INDEX: 27.46 KG/M2 | DIASTOLIC BLOOD PRESSURE: 78 MMHG

## 2020-02-01 DIAGNOSIS — R53.1 GENERALIZED WEAKNESS: ICD-10-CM

## 2020-02-01 DIAGNOSIS — H81.10 BENIGN PAROXYSMAL POSITIONAL VERTIGO, UNSPECIFIED LATERALITY: Primary | ICD-10-CM

## 2020-02-01 LAB
ALBUMIN SERPL-MCNC: 4.3 G/DL (ref 3.5–5.2)
ALBUMIN/GLOB SERPL: 2 G/DL
ALP SERPL-CCNC: 80 U/L (ref 39–117)
ALT SERPL W P-5'-P-CCNC: 15 U/L (ref 1–33)
ANION GAP SERPL CALCULATED.3IONS-SCNC: 11.6 MMOL/L (ref 5–15)
AST SERPL-CCNC: 22 U/L (ref 1–32)
BACTERIA UR QL AUTO: ABNORMAL /HPF
BASOPHILS # BLD AUTO: 0.03 10*3/MM3 (ref 0–0.2)
BASOPHILS NFR BLD AUTO: 0.5 % (ref 0–1.5)
BILIRUB SERPL-MCNC: 1.2 MG/DL (ref 0.2–1.2)
BILIRUB UR QL STRIP: NEGATIVE
BUN BLD-MCNC: 11 MG/DL (ref 8–23)
BUN/CREAT SERPL: 9.6 (ref 7–25)
CALCIUM SPEC-SCNC: 9.2 MG/DL (ref 8.6–10.5)
CHLORIDE SERPL-SCNC: 101 MMOL/L (ref 98–107)
CLARITY UR: CLEAR
CO2 SERPL-SCNC: 28.4 MMOL/L (ref 22–29)
COLOR UR: YELLOW
CREAT BLD-MCNC: 1.14 MG/DL (ref 0.57–1)
DEPRECATED RDW RBC AUTO: 44 FL (ref 37–54)
EOSINOPHIL # BLD AUTO: 0.04 10*3/MM3 (ref 0–0.4)
EOSINOPHIL NFR BLD AUTO: 0.7 % (ref 0.3–6.2)
ERYTHROCYTE [DISTWIDTH] IN BLOOD BY AUTOMATED COUNT: 13.1 % (ref 12.3–15.4)
GFR SERPL CREATININE-BSD FRML MDRD: 46 ML/MIN/1.73
GLOBULIN UR ELPH-MCNC: 2.1 GM/DL
GLUCOSE BLD-MCNC: 108 MG/DL (ref 65–99)
GLUCOSE UR STRIP-MCNC: NEGATIVE MG/DL
HCT VFR BLD AUTO: 38.4 % (ref 34–46.6)
HGB BLD-MCNC: 12.8 G/DL (ref 12–15.9)
HGB UR QL STRIP.AUTO: ABNORMAL
HYALINE CASTS UR QL AUTO: ABNORMAL /LPF
IMM GRANULOCYTES # BLD AUTO: 0.02 10*3/MM3 (ref 0–0.05)
IMM GRANULOCYTES NFR BLD AUTO: 0.3 % (ref 0–0.5)
KETONES UR QL STRIP: NEGATIVE
LEUKOCYTE ESTERASE UR QL STRIP.AUTO: ABNORMAL
LYMPHOCYTES # BLD AUTO: 1.45 10*3/MM3 (ref 0.7–3.1)
LYMPHOCYTES NFR BLD AUTO: 24 % (ref 19.6–45.3)
MCH RBC QN AUTO: 31.1 PG (ref 26.6–33)
MCHC RBC AUTO-ENTMCNC: 33.3 G/DL (ref 31.5–35.7)
MCV RBC AUTO: 93.2 FL (ref 79–97)
MONOCYTES # BLD AUTO: 0.45 10*3/MM3 (ref 0.1–0.9)
MONOCYTES NFR BLD AUTO: 7.5 % (ref 5–12)
NEUTROPHILS # BLD AUTO: 4.05 10*3/MM3 (ref 1.7–7)
NEUTROPHILS NFR BLD AUTO: 67 % (ref 42.7–76)
NITRITE UR QL STRIP: POSITIVE
NRBC BLD AUTO-RTO: 0 /100 WBC (ref 0–0.2)
PH UR STRIP.AUTO: 7 [PH] (ref 5–8)
PLATELET # BLD AUTO: 186 10*3/MM3 (ref 140–450)
PMV BLD AUTO: 11 FL (ref 6–12)
POTASSIUM BLD-SCNC: 3.9 MMOL/L (ref 3.5–5.2)
PROT SERPL-MCNC: 6.4 G/DL (ref 6–8.5)
PROT UR QL STRIP: NEGATIVE
RBC # BLD AUTO: 4.12 10*6/MM3 (ref 3.77–5.28)
RBC # UR: ABNORMAL /HPF
REF LAB TEST METHOD: ABNORMAL
SODIUM BLD-SCNC: 141 MMOL/L (ref 136–145)
SP GR UR STRIP: 1.01 (ref 1–1.03)
SQUAMOUS #/AREA URNS HPF: ABNORMAL /HPF
TRANS CELLS #/AREA URNS HPF: ABNORMAL /HPF
TROPONIN T SERPL-MCNC: <0.01 NG/ML (ref 0–0.03)
UROBILINOGEN UR QL STRIP: ABNORMAL
WBC NRBC COR # BLD: 6.04 10*3/MM3 (ref 3.4–10.8)
WBC UR QL AUTO: ABNORMAL /HPF

## 2020-02-01 PROCEDURE — 84484 ASSAY OF TROPONIN QUANT: CPT | Performed by: EMERGENCY MEDICINE

## 2020-02-01 PROCEDURE — 93010 ELECTROCARDIOGRAM REPORT: CPT | Performed by: INTERNAL MEDICINE

## 2020-02-01 PROCEDURE — 93005 ELECTROCARDIOGRAM TRACING: CPT | Performed by: EMERGENCY MEDICINE

## 2020-02-01 PROCEDURE — 25010000002 LORAZEPAM PER 2 MG: Performed by: EMERGENCY MEDICINE

## 2020-02-01 PROCEDURE — 85025 COMPLETE CBC W/AUTO DIFF WBC: CPT | Performed by: EMERGENCY MEDICINE

## 2020-02-01 PROCEDURE — 96374 THER/PROPH/DIAG INJ IV PUSH: CPT

## 2020-02-01 PROCEDURE — 96375 TX/PRO/DX INJ NEW DRUG ADDON: CPT

## 2020-02-01 PROCEDURE — 81001 URINALYSIS AUTO W/SCOPE: CPT | Performed by: EMERGENCY MEDICINE

## 2020-02-01 PROCEDURE — 25010000002 KETOROLAC TROMETHAMINE PER 15 MG: Performed by: EMERGENCY MEDICINE

## 2020-02-01 PROCEDURE — 25010000002 METOCLOPRAMIDE PER 10 MG: Performed by: EMERGENCY MEDICINE

## 2020-02-01 PROCEDURE — 80053 COMPREHEN METABOLIC PANEL: CPT | Performed by: EMERGENCY MEDICINE

## 2020-02-01 PROCEDURE — 99284 EMERGENCY DEPT VISIT MOD MDM: CPT

## 2020-02-01 RX ORDER — DIAZEPAM 5 MG/1
5 TABLET ORAL EVERY 8 HOURS PRN
Qty: 15 TABLET | Refills: 0 | Status: SHIPPED | OUTPATIENT
Start: 2020-02-01 | End: 2020-02-12 | Stop reason: ALTCHOICE

## 2020-02-01 RX ORDER — SODIUM CHLORIDE 0.9 % (FLUSH) 0.9 %
10 SYRINGE (ML) INJECTION AS NEEDED
Status: DISCONTINUED | OUTPATIENT
Start: 2020-02-01 | End: 2020-02-02 | Stop reason: HOSPADM

## 2020-02-01 RX ORDER — KETOROLAC TROMETHAMINE 15 MG/ML
15 INJECTION, SOLUTION INTRAMUSCULAR; INTRAVENOUS ONCE
Status: COMPLETED | OUTPATIENT
Start: 2020-02-01 | End: 2020-02-01

## 2020-02-01 RX ORDER — METOCLOPRAMIDE HYDROCHLORIDE 5 MG/ML
10 INJECTION INTRAMUSCULAR; INTRAVENOUS ONCE
Status: COMPLETED | OUTPATIENT
Start: 2020-02-01 | End: 2020-02-01

## 2020-02-01 RX ORDER — MECLIZINE HYDROCHLORIDE 25 MG/1
25 TABLET ORAL ONCE
Status: COMPLETED | OUTPATIENT
Start: 2020-02-01 | End: 2020-02-01

## 2020-02-01 RX ORDER — LORAZEPAM 2 MG/ML
0.5 INJECTION INTRAMUSCULAR ONCE
Status: COMPLETED | OUTPATIENT
Start: 2020-02-01 | End: 2020-02-01

## 2020-02-01 RX ORDER — MECLIZINE HYDROCHLORIDE 25 MG/1
25 TABLET ORAL 3 TIMES DAILY PRN
Qty: 15 TABLET | Refills: 0 | Status: SHIPPED | OUTPATIENT
Start: 2020-02-01 | End: 2020-02-21

## 2020-02-01 RX ORDER — ONDANSETRON 8 MG/1
8 TABLET, ORALLY DISINTEGRATING ORAL EVERY 8 HOURS PRN
Qty: 15 TABLET | Refills: 0 | Status: ON HOLD | OUTPATIENT
Start: 2020-02-01 | End: 2020-10-22

## 2020-02-01 RX ADMIN — MECLIZINE HYDROCHLORIDE 25 MG: 25 TABLET ORAL at 20:16

## 2020-02-01 RX ADMIN — KETOROLAC TROMETHAMINE 15 MG: 15 INJECTION, SOLUTION INTRAMUSCULAR; INTRAVENOUS at 20:17

## 2020-02-01 RX ADMIN — METOCLOPRAMIDE 10 MG: 5 INJECTION, SOLUTION INTRAMUSCULAR; INTRAVENOUS at 20:22

## 2020-02-01 RX ADMIN — SODIUM CHLORIDE 1000 ML: 9 INJECTION, SOLUTION INTRAVENOUS at 20:06

## 2020-02-01 RX ADMIN — LORAZEPAM 0.5 MG: 2 INJECTION INTRAMUSCULAR; INTRAVENOUS at 20:19

## 2020-02-01 NOTE — ED TRIAGE NOTES
Pt reports generalized headache, dizziness, and weakness has been going on for months, states today got worse. Has been seeing neurologist for this.

## 2020-02-02 NOTE — ED PROVIDER NOTES
EMERGENCY DEPARTMENT ENCOUNTER    CHIEF COMPLAINT  Chief Complaint: Dizziness  History given by: Pt  History limited by: None  Room Number: 16/16  PMD: Juliane Dye MD  Neurology [facility]    HPI:  Pt is a 79 y.o. female who presents complaining of intermittent dizziness that has been ongoing for several months but worsened today. She describes the dizziness as feeling off balance and the room spinning. Her dizziness is worse with movement. She also c/o a right sided headache. Pt denies fever, CP, SOA, weakness, and numbness. Pt has a h/o vertigo and chronic paroxymal hemicrania and is followed by AR[facility] Pruitt of neurology. Pt ambulates with a walker.     Duration:  Several months, worse today  Onset: gradual  Timing: intermittent   Quality: feeling off balance, room spinning  Intensity/Severity: moderate   Progression: ongoing   Associated Symptoms: chronic right sided headache  Aggravating Factors: movement   Alleviating Factors: none stated   Previous Episodes: h/o vertigo  Treatment before arrival: none    PAST MEDICAL HISTORY  Active Ambulatory Problems     Diagnosis Date Noted   • Adrenal insufficiency (CMS/[facility]) 03/06/2015   • PVCs (premature ventricular contractions) 11/02/2012   • Paroxysmal atrial fibrillation (CMS/[facility]CC) 04/30/2019   • Hypertension 04/30/2019   • Depression 04/30/2019   • Hypokalemia 04/30/2019   • Valvular heart disease 04/30/2019   • Mild cognitive impairment 04/30/2019   • Chronic paroxysmal hemicrania, 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/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   • Dizziness 12/31/2019     Resolved Ambulatory Problems     Diagnosis Date Noted   • No Resolved Ambulatory Problems     Past Medical History:   Diagnosis Date   • Anxiety    • Arthritis    • Asthma    • Atrial fibrillation (CMS/[facility])    • CHF (congestive heart failure) (CMS/[facility])    • Colon tumor    • COPD (chronic obstructive pulmonary disease) (CMS/[facility])    • Dementia (CMS/[facility])    • Difficulty walking    • GERD (gastroesophageal  reflux disease)    • Goiter    • Headache, tension-type    • Hypocalcemia    • Memory loss    • Migraine    • Sleep apnea        PAST SURGICAL HISTORY  Past Surgical History:   Procedure Laterality Date   • APPENDECTOMY     • BACK SURGERY      6 back surgeries   • BREAST SURGERY     • CARDIAC ABLATION     • CATARACT EXTRACTION, BILATERAL Bilateral    • CHOLECYSTECTOMY     • HYSTERECTOMY     • JOINT REPLACEMENT Bilateral     shoulder   • MITRAL VALVE REPAIR/REPLACEMENT  2011   • OOPHORECTOMY     • TONSILLECTOMY AND ADENOIDECTOMY     • TRICUSPID VALVE SURGERY  2011       FAMILY HISTORY  Family History   Problem Relation Age of Onset   • Dementia Mother    • Cancer Father        SOCIAL HISTORY  Social History     Socioeconomic History   • Marital status:      Spouse name: Not on file   • Number of children: Not on file   • Years of education: Not on file   • Highest education level: Not on file   Tobacco Use   • Smoking status: Never Smoker   • Smokeless tobacco: Never Used   Substance and Sexual Activity   • Alcohol use: Yes     Alcohol/week: 1.0 standard drinks     Types: 1 Glasses of wine per week   • Drug use: Never   • Sexual activity: Defer       ALLERGIES  Dilaudid [hydromorphone hcl]; Ace inhibitors; Bactrim [sulfamethoxazole-trimethoprim]; Chloraprep one step [chlorhexidine gluconate]; Ciprofloxacin; Codeine; Keflex [cephalexin]; Latex; and Penicillins    REVIEW OF SYSTEMS  Review of Systems   Constitutional: Negative for fever.   HENT: Negative for sore throat.    Eyes: Negative.    Respiratory: Negative for cough and shortness of breath.    Cardiovascular: Negative for chest pain.   Gastrointestinal: Negative for abdominal pain, diarrhea and vomiting.   Genitourinary: Negative for dysuria.   Musculoskeletal: Negative for neck pain.   Skin: Negative for rash.   Allergic/Immunologic: Negative.    Neurological: Positive for dizziness and headaches (chronic). Negative for weakness and numbness.    Hematological: Negative.    Psychiatric/Behavioral: Negative.    All other systems reviewed and are negative.      PHYSICAL EXAM  ED Triage Vitals [02/01/20 1756]   Temp Heart Rate Resp BP SpO2   -- 80 18 -- 98 %      Temp src Heart Rate Source Patient Position BP Location FiO2 (%)   -- Monitor -- -- --       Physical Exam   Constitutional: She is oriented to person, place, and time. No distress.   HENT:   Head: Normocephalic and atraumatic.   Eyes: Pupils are equal, round, and reactive to light. EOM are normal.   Neck: Normal range of motion. Neck supple.   Cardiovascular: Normal rate, regular rhythm and normal heart sounds.   Pulmonary/Chest: Effort normal and breath sounds normal. No respiratory distress.   Abdominal: Soft. There is no tenderness. There is no rebound and no guarding.   Musculoskeletal: Normal range of motion. She exhibits no edema.   Neurological: She is alert and oriented to person, place, and time. She has normal sensation and normal strength.   Increased sensation of room spinning with head movement   Skin: Skin is warm and dry. No rash noted.   Psychiatric: Mood and affect normal.   Nursing note and vitals reviewed.      LAB RESULTS  Lab Results (last 24 hours)     Procedure Component Value Units Date/Time    CBC & Differential [[id number]] Collected:  02/01/20 2005    Specimen:  Blood Updated:  02/01/20 2025    Narrative:       The following orders were created for panel order CBC & Differential.  Procedure                               Abnormality         Status                     ---------                               -----------         ------                     CBC Auto Differential[[id number]]        Normal              Final result                 Please view results for these tests on the individual orders.    Comprehensive Metabolic Panel [[id number]]  (Abnormal) Collected:  02/01/20 2005    Specimen:  Blood Updated:  02/01/20 2049     Glucose 108 mg/dL      BUN 11 mg/dL       Creatinine 1.14 mg/dL      Sodium 141 mmol/L      Potassium 3.9 mmol/L      Chloride 101 mmol/L      CO2 28.4 mmol/L      Calcium 9.2 mg/dL      Total Protein 6.4 g/dL      Albumin 4.30 g/dL      ALT (SGPT) 15 U/L      AST (SGOT) 22 U/L      Alkaline Phosphatase 80 U/L      Total Bilirubin 1.2 mg/dL      eGFR Non African Amer 46 mL/min/1.73      Globulin 2.1 gm/dL      A/G Ratio 2.0 g/dL      BUN/Creatinine Ratio 9.6     Anion Gap 11.6 mmol/L     Narrative:       GFR Normal >60  Chronic Kidney Disease <60  Kidney Failure <15      Troponin [[id number]]  (Normal) Collected:  02/01/20 2005    Specimen:  Blood Updated:  02/01/20 2051     Troponin T <0.010 ng/mL     Narrative:       Troponin T Reference Range:  <= 0.03 ng/mL-   Negative for AMI  >0.03 ng/mL-     Abnormal for myocardial necrosis.  Clinicians would have to utilize clinical acumen, EKG, Troponin and serial changes to determine if it is an Acute Myocardial Infarction or myocardial injury due to an underlying chronic condition.       Results may be falsely decreased if patient taking Biotin.      CBC Auto Differential [[id number]]  (Normal) Collected:  02/01/20 2005    Specimen:  Blood Updated:  02/01/20 2025     WBC 6.04 10*3/mm3      RBC 4.12 10*6/mm3      Hemoglobin 12.8 g/dL      Hematocrit 38.4 %      MCV 93.2 fL      MCH 31.1 pg      MCHC 33.3 g/dL      RDW 13.1 %      RDW-SD 44.0 fl      MPV 11.0 fL      Platelets 186 10*3/mm3      Neutrophil % 67.0 %      Lymphocyte % 24.0 %      Monocyte % 7.5 %      Eosinophil % 0.7 %      Basophil % 0.5 %      Immature Grans % 0.3 %      Neutrophils, Absolute 4.05 10*3/mm3      Lymphocytes, Absolute 1.45 10*3/mm3      Monocytes, Absolute 0.45 10*3/mm3      Eosinophils, Absolute 0.04 10*3/mm3      Basophils, Absolute 0.03 10*3/mm3      Immature Grans, Absolute 0.02 10*3/mm3      nRBC 0.0 /100 WBC     Urinalysis With Microscopic If Indicated (No Culture) - Urine, Clean Catch [[id number]]  (Abnormal) Collected:  02/01/20  2128    Specimen:  Urine, Clean Catch Updated:  02/01/20 2146     Color, UA Yellow     Appearance, UA Clear     pH, UA 7.0     Specific Gravity, UA 1.011     Glucose, UA Negative     Ketones, UA Negative     Bilirubin, UA Negative     Blood, UA Trace     Protein, UA Negative     Leuk Esterase, UA Large (3+)     Nitrite, UA Positive     Urobilinogen, UA 0.2 E.U./dL    Urinalysis, Microscopic Only - Urine, Clean Catch [[id number]]  (Abnormal) Collected:  02/01/20 2128    Specimen:  Urine, Clean Catch Updated:  02/01/20 2206     RBC, UA 0-2 /HPF      WBC, UA 21-30 /HPF      Bacteria, UA 4+ /HPF      Squamous Epithelial Cells, UA 3-6 /HPF      Transitional Epithelial Cells, UA 0-2 /HPF      Hyaline Casts, UA 3-6 /LPF      Methodology Automated Microscopy          I ordered the above labs and reviewed the results      PROCEDURES  Procedures  EKG                                 EKG time: 7:57 PM  Rhythm/Rate: NSR 83  P waves and OR: normal  QRS, axis: normal   ST and T waves: normal      Interpreted Contemporaneously by me, independently viewed  unchanged compared to prior 9/5/19       PROGRESS AND CONSULTS     7:51 PM  Labs and EKG ordered. Reglan, Toradol, Meclizine, and Ativan ordered for HA and dizziness.     9:26 PM  Rechecked pt who is resting in NAD. Her dizziness and headache have improved. She was able to ambulate to the bathroom without becoming dizzy. Labs are unremarkable. Plan to discharge pt with Meclizine and Ativan for dizziness. I advised her to f/u with neurology. Pt understands and agrees with the plan, all questions answered.        MEDICAL DECISION MAKING  Results were reviewed/discussed with the patient and they were also made aware of online access. Pt also made aware that some labs, such as cultures, will not be resulted during ER visit and follow up with PMD is necessary.     MDM  Number of Diagnoses or Management Options     Amount and/or Complexity of Data Reviewed  Clinical lab tests: ordered  and reviewed (Troponin is <0.010)  Decide to obtain previous medical records or to obtain history from someone other than the patient: yes  Review and summarize past medical records: yes (Pt has had negative CT head, MRI brain, and CTA head and neck. )           DIAGNOSIS  Final diagnoses:   Benign paroxysmal positional vertigo, unspecified laterality   Generalized weakness       DISPOSITION  DISCHARGE    Patient discharged in stable condition.    Reviewed implications of results, diagnosis, meds, responsibility to follow up, warning signs and symptoms of possible worsening, potential complications and reasons to return to ER.    Patient/Family voiced understanding of above instructions.    Discussed plan for discharge, as there is no emergent indication for admission. Patient referred to primary care provider for BP management due to today's BP. Pt/family is agreeable and understands need for follow up and repeat testing.  Pt is aware that discharge does not mean that nothing is wrong but it indicates no emergency is present that requires admission and they must continue care with follow-up as given below or physician of their choice.     FOLLOW-UP  Juliane Dye MD  [address] [facility] [address]  [place] [address]  [phone]    Schedule an appointment as soon as possible for a visit       Sena Pedersen, APRN  [address] [facility] [address]  [place] [address]  [phone]    Schedule an appointment as soon as possible for a visit            Medication List      New Prescriptions    diazePAM 5 MG tablet  Commonly known as:  VALIUM  Take 1 tablet by mouth Every 8 (Eight) Hours As Needed (dizzyness).     meclizine 25 MG tablet  Commonly known as:  ANTIVERT  Take 1 tablet by mouth 3 (Three) Times a Day As Needed for Dizziness.     ondansetron ODT 8 MG disintegrating tablet  Commonly known as:  ZOFRAN ODT  Take 1 tablet by mouth Every 8 (Eight) Hours As Needed for Nausea or   Vomiting.            Latest Documented  Vital Signs:  As of 10:44 PM  BP- 137/78 HR- 79 Temp- 98.4 °F (36.9 °C) (Tympanic) O2 sat- 96%    --  Documentation assistance provided by nathan Brush for Dr. Pozo.  Information recorded by the scribe was done at my direction and has been verified and validated by me.        Serene Brush  02/01/20 2209       Giovani Pozo MD  02/01/20 4880

## 2020-02-12 ENCOUNTER — OFFICE VISIT (OUTPATIENT)
Dept: INTERNAL MEDICINE | Facility: CLINIC | Age: 80
End: 2020-02-12

## 2020-02-12 VITALS — BODY MASS INDEX: 27.46 KG/M2 | HEIGHT: 63 IN

## 2020-02-12 DIAGNOSIS — N30.00 ACUTE CYSTITIS WITHOUT HEMATURIA: Primary | ICD-10-CM

## 2020-02-12 DIAGNOSIS — E27.40 ADRENAL INSUFFICIENCY (HCC): ICD-10-CM

## 2020-02-12 DIAGNOSIS — G31.84 MILD COGNITIVE IMPAIRMENT: ICD-10-CM

## 2020-02-12 DIAGNOSIS — R26.89 BALANCE PROBLEM: ICD-10-CM

## 2020-02-12 DIAGNOSIS — R42 DIZZINESS: ICD-10-CM

## 2020-02-12 PROCEDURE — 99215 OFFICE O/P EST HI 40 MIN: CPT | Performed by: INTERNAL MEDICINE

## 2020-02-12 NOTE — PROGRESS NOTES
Assessment and Plan  Margaret was seen today for balance issues.    Diagnoses and all orders for this visit:    Acute cystitis without hematuria  -     Urine Culture - Urine, Urine, Clean Catch; Future  -     Urine Culture - Urine, Urine, Clean Catch    Dizziness  Comments:  Still no clear cause - hold furosemide and potassium, daily weights, call if increases.      Mild cognitive impairment  Comments:  slow decline, followig on Aricept alone    Adrenal insufficiency (CMS/HCC)  Comments:  stable.    Balance problem  Comments:  agree with plan to start PT [facility] the living [facility].       spent over 50 minutes in face to face evaluation and over half of the time in counseling with plans for patient going forward with patient and family- most of the time discussing plan to address issues and reviewing chart.        F/U and Patient Instructions    Return in about 4 weeks (around 3/11/2020).  There are no Patient Instructions on file for this visit.    Subjective    Margaret Alexander is a 79 y.o. female being seen in our office today for Balance Issues     History of the Present Illness  HPI  Here with her  and daughter- they think she needs therapy to help with her balance.  She did try vestibular therapy late 2019 but it did not help.  She does have some continued dizziness but mostly off balance.  She c/o headaches and a frontal pressure- seems to be there much of the time.   She is having trouble going out of her condo on her own due to the balance, dizziness, etc. Despite chronic use of the walker.  She is lately combative in the evening.   Memory issues are worse.  She is eating and drinking well.  Saw Dr. Curiel last week for reg f/u of the adrenal insufficiency-did not make any changes.  Interestingly, she hit her head in November- quite hard on a door frame per .  No LOC but he thinks her headaches and dizziness started after that.       Patient History        Significant Past History  The  following portions of the patient's history were reviewed and updated as appropriate:PMHroutine: Social history , Allergies, Current Medications, Active Problem List and Health Maintenance              Social History  She  reports that she has never smoked. She has never used smokeless tobacco. She reports that she drinks about 1.0 standard drinks of alcohol per week. She reports that she does not use drugs.                         Review of Symptoms  Review of Systems   Constitution: Positive for weight loss.   Cardiovascular: Negative.    Respiratory: Negative.    Endocrine: Negative.    Hematologic/Lymphatic: Negative.    Musculoskeletal: Negative.    Gastrointestinal: Negative.    Genitourinary: Negative.    Neurological: Positive for dizziness, headaches and loss of balance.   Psychiatric/Behavioral: Memory loss: related to underlying dementia.     Objective  Vital Signs         BP Readings from Last 1 Encounters:   02/01/20 137/78     Wt Readings from Last 3 Encounters:   02/01/20 70.3 kg (155 lb)   12/31/19 73.9 kg (163 lb)   10/02/19 71.2 kg (157 lb)   Body mass index is 27.46 kg/m².          Physical Exam   Physical Exam   Constitutional: No distress.   HENT:   Mouth/Throat: Oropharynx is clear and moist.   Cardiovascular: Normal rate and regular rhythm.   Pulmonary/Chest: Effort normal and breath sounds normal.   Musculoskeletal: She exhibits no edema.   Walking with rolling walker, not off balance on just a short distance.    Neurological: She is alert.   Oriented to person and place today but per family gets easily confused.    Psychiatric: She has a normal mood and affect. Her behavior is normal.     Data Reviewed    Recent Results (from the past 2016 hour(s))   POC Creatinine    Collection Time: 01/14/20  2:11 PM   Result Value Ref Range    Creatinine 0.80 0.60 - 1.30 mg/dL   Comprehensive Metabolic Panel    Collection Time: 02/01/20  8:05 PM   Result Value Ref Range    Glucose 108 (H) 65 - 99 mg/dL     BUN 11 8 - 23 mg/dL    Creatinine 1.14 (H) 0.57 - 1.00 mg/dL    Sodium 141 136 - 145 mmol/L    Potassium 3.9 3.5 - 5.2 mmol/L    Chloride 101 98 - 107 mmol/L    CO2 28.4 22.0 - 29.0 mmol/L    Calcium 9.2 8.6 - 10.5 mg/dL    Total Protein 6.4 6.0 - 8.5 g/dL    Albumin 4.30 3.50 - 5.20 g/dL    ALT (SGPT) 15 1 - 33 U/L    AST (SGOT) 22 1 - 32 U/L    Alkaline Phosphatase 80 39 - 117 U/L    Total Bilirubin 1.2 0.2 - 1.2 mg/dL    eGFR Non African Amer 46 (L) >60 mL/min/1.73    Globulin 2.1 gm/dL    A/G Ratio 2.0 g/dL    BUN/Creatinine Ratio 9.6 7.0 - 25.0    Anion Gap 11.6 5.0 - 15.0 mmol/L   Troponin    Collection Time: 02/01/20  8:05 PM   Result Value Ref Range    Troponin T <0.010 0.000 - 0.030 ng/mL   CBC Auto Differential    Collection Time: 02/01/20  8:05 PM   Result Value Ref Range    WBC 6.04 3.40 - 10.80 10*3/mm3    RBC 4.12 3.77 - 5.28 10*6/mm3    Hemoglobin 12.8 12.0 - 15.9 g/dL    Hematocrit 38.4 34.0 - 46.6 %    MCV 93.2 79.0 - 97.0 fL    MCH 31.1 26.6 - 33.0 pg    MCHC 33.3 31.5 - 35.7 g/dL    RDW 13.1 12.3 - 15.4 %    RDW-SD 44.0 37.0 - 54.0 fl    MPV 11.0 6.0 - 12.0 fL    Platelets 186 140 - 450 10*3/mm3    Neutrophil % 67.0 42.7 - 76.0 %    Lymphocyte % 24.0 19.6 - 45.3 %    Monocyte % 7.5 5.0 - 12.0 %    Eosinophil % 0.7 0.3 - 6.2 %    Basophil % 0.5 0.0 - 1.5 %    Immature Grans % 0.3 0.0 - 0.5 %    Neutrophils, Absolute 4.05 1.70 - 7.00 10*3/mm3    Lymphocytes, Absolute 1.45 0.70 - 3.10 10*3/mm3    Monocytes, Absolute 0.45 0.10 - 0.90 10*3/mm3    Eosinophils, Absolute 0.04 0.00 - 0.40 10*3/mm3    Basophils, Absolute 0.03 0.00 - 0.20 10*3/mm3    Immature Grans, Absolute 0.02 0.00 - 0.05 10*3/mm3    nRBC 0.0 0.0 - 0.2 /100 WBC   Urinalysis With Microscopic If Indicated (No Culture) - Urine, Clean Catch    Collection Time: 02/01/20  9:28 PM   Result Value Ref Range    Color, UA Yellow Yellow, Straw    Appearance, UA Clear Clear    pH, UA 7.0 5.0 - 8.0    Specific Gravity, UA 1.011 1.005 - 1.030     Glucose, UA Negative Negative    Ketones, UA Negative Negative    Bilirubin, UA Negative Negative    Blood, UA Trace (A) Negative    Protein, UA Negative Negative    Leuk Esterase, UA Large (3+) (A) Negative    Nitrite, UA Positive (A) Negative    Urobilinogen, UA 0.2 E.U./dL 0.2 - 1.0 E.U./dL   Urinalysis, Microscopic Only - Urine, Clean Catch    Collection Time: 02/01/20  9:28 PM   Result Value Ref Range    RBC, UA 0-2 None Seen, 0-2 /HPF    WBC, UA 21-30 (A) None Seen, 0-2 /HPF    Bacteria, UA 4+ (A) None Seen /HPF    Squamous Epithelial Cells, UA 3-6 (A) None Seen, 0-2 /HPF    Transitional Epithelial Cells, UA 0-2 0 - 2 /HPF    Hyaline Casts, UA 3-6 None Seen /LPF    Methodology Automated Microscopy    Urine Culture - Urine, Urine, Clean Catch    Collection Time: 02/12/20  3:10 PM   Result Value Ref Range    Urine Culture Final report (A)     Result 1 Klebsiella pneumoniae (A)     Susceptibility Testing Comment

## 2020-02-16 LAB
BACTERIA UR CULT: ABNORMAL
BACTERIA UR CULT: ABNORMAL
OTHER ANTIBIOTIC SUSC ISLT: ABNORMAL

## 2020-02-17 RX ORDER — NITROFURANTOIN 25; 75 MG/1; MG/1
100 CAPSULE ORAL 2 TIMES DAILY
Qty: 14 CAPSULE | Refills: 0 | Status: SHIPPED | OUTPATIENT
Start: 2020-02-17 | End: 2020-02-24

## 2020-02-21 ENCOUNTER — TELEPHONE (OUTPATIENT)
Dept: INTERNAL MEDICINE | Facility: CLINIC | Age: 80
End: 2020-02-21

## 2020-02-21 RX ORDER — MECLIZINE HYDROCHLORIDE 25 MG/1
TABLET ORAL
Qty: 15 TABLET | Refills: 0 | Status: SHIPPED | OUTPATIENT
Start: 2020-02-21 | End: 2020-02-24

## 2020-02-24 RX ORDER — MECLIZINE HYDROCHLORIDE 25 MG/1
TABLET ORAL
Qty: 15 TABLET | Refills: 0 | Status: ON HOLD | OUTPATIENT
Start: 2020-02-24 | End: 2020-10-22

## 2020-02-26 NOTE — TELEPHONE ENCOUNTER
No call back   
Patient called and states that she has been falling more frequently, she states that they are suggesting her to get a rollator walker. She would like for you to call her on Monday   
TC  
no paroxysmal nocturnal dyspnea/no orthopnea/no peripheral edema/no claudication/no dyspnea on exertion/no chest pain

## 2020-03-06 ENCOUNTER — OFFICE VISIT (OUTPATIENT)
Dept: NEUROLOGY | Facility: CLINIC | Age: 80
End: 2020-03-06

## 2020-03-06 VITALS
OXYGEN SATURATION: 99 % | SYSTOLIC BLOOD PRESSURE: 132 MMHG | WEIGHT: 154 LBS | BODY MASS INDEX: 27.29 KG/M2 | DIASTOLIC BLOOD PRESSURE: 94 MMHG | HEART RATE: 84 BPM | HEIGHT: 63 IN

## 2020-03-06 DIAGNOSIS — G44.89 OTHER HEADACHE SYNDROME: Primary | ICD-10-CM

## 2020-03-06 DIAGNOSIS — R42 DIZZINESS: ICD-10-CM

## 2020-03-06 DIAGNOSIS — R26.89 BALANCE PROBLEM: ICD-10-CM

## 2020-03-06 DIAGNOSIS — F32.A DEPRESSION, UNSPECIFIED DEPRESSION TYPE: ICD-10-CM

## 2020-03-06 DIAGNOSIS — G31.84 MILD COGNITIVE IMPAIRMENT: ICD-10-CM

## 2020-03-06 PROBLEM — R51.9 HEADACHE: Status: ACTIVE | Noted: 2020-03-06

## 2020-03-06 PROCEDURE — 99215 OFFICE O/P EST HI 40 MIN: CPT | Performed by: NURSE PRACTITIONER

## 2020-03-06 NOTE — PROGRESS NOTES
DOS: 3/7/2020  NAME: Margaret Alexander   : 1940  PCP: Juliane Dye MD    Chief Complaint   Patient presents with   • Headache   • Dizziness   • Gait Problem      SUBJECTIVE  Neurological Problem:  79 y.o. RHW female with adrenal insufficiency (followed by Dr. 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 She presents today for headache and dizziness.  She is accompanied by her spouse.    Interval History:   **For previous history, please see progress note dated 2019.    Ms. Freitas was previously evaluated for headaches and longstanding c/o dizziness.  Imaging including MRI in Oct. 2019 of the brain was unrevealing.  It was recommended at her previous appointment to have a CTA head and neck to evaluate any posterior circulation abnormalities which I reviewed. CT head showed moderate small vessel disease, no acute findings. CTA neck notable for some multilevel cervical spondylosis, minimal left ICA stenosis read at 20%; Intracranially, arteries are unremarkable, including posterior circulation.     She presents today and she continues with complaints of nearly daily chronic bifrontal, pressure-type headaches that she rates at a 4-5/10, if pain is worse, she usually takes Tylenol with some improvement.  She reportedly is not taking any NSAIDs regularly.  She is not on any other preventive or rescue medication for headaches.  We discussed this at her previous visit and she was reluctant to add any further medications to her regimen.  She has since discontinued caffeine, alcohol, states she drinks at least 6 to 8 glasses of water daily.  She reports getting fairly good sleep, about 7 hours per night.  Again we discussed possible daily preventive medications; however patient is reluctant.    Regarding her dizziness, it appears she presented to the ED on 2020 with complaints of vertigo.  Per review of records she was  treated with Reglan, Toradol, meclizine and Ativan with good improvement in her dizziness and headache.  She apparently has been getting physical therapy and doing well with this although she continues with significant right hip ROM/pain difficulties secondary to a previous hip replacement. Which is affecting her gait and ability to stand.  She also expresses significant concern over fear of falling, becomes quite tearful in discussing this, is also worried about her memory.  She is on Aricept per her PCP. She is currently using a Rollator for assistance and is evidently been doing well with this, no falls.  Per spouse, she is forgetful, no significant changes in personality or behavior, no hallucinations; however, he does endorse that she has some significant depression.    Review of Systems:Review of Systems   Constitutional: Negative for activity change, appetite change and fatigue.   HENT: Negative for ear pain, facial swelling and trouble swallowing.    Eyes: Negative for photophobia, pain and visual disturbance.   Musculoskeletal: Positive for gait problem. Negative for back pain and neck pain.   Allergic/Immunologic: Negative for environmental allergies, food allergies and immunocompromised state.   Neurological: Positive for dizziness and headaches (daily). Negative for tremors, seizures, syncope, facial asymmetry, speech difficulty, weakness, light-headedness and numbness.   Hematological: Negative for adenopathy. Bruises/bleeds easily.   Psychiatric/Behavioral: Positive for confusion and sleep disturbance (sometimes). Negative for agitation, behavioral problems, decreased concentration, dysphoric mood, hallucinations, self-injury and suicidal ideas. The patient is not nervous/anxious and is not hyperactive.     Above ROS reviewed    The following portions of the patient's history were reviewed and updated as appropriate: allergies, current medications, past family history, past medical history, past social  "history, past surgical history and problem list.    Current Medications:   Current Outpatient Medications:   •  aspirin 81 MG chewable tablet, Chew 81 mg Daily., Disp: , Rfl:   •  citalopram (CeleXA) 20 MG tablet, Take 1 tablet by mouth Daily., Disp: 90 tablet, Rfl: 2  •  donepezil (ARICEPT) 10 MG tablet, Take 1 tablet by mouth Every Night., Disp: 90 tablet, Rfl: 3  •  fludrocortisone 0.1 MG tablet, TAKE 1 TABLET BY MOUTH DAILY, Disp: 30 tablet, Rfl: 0  •  levothyroxine (SYNTHROID, LEVOTHROID) 100 MCG tablet, Take  by mouth Daily., Disp: , Rfl:   •  meclizine (ANTIVERT) 25 MG tablet, GIVE \"PA\" 1 TABLET BY MOUTH THREE TIMES DAILY AS NEEDED FOR DIZZINESS, Disp: 15 tablet, Rfl: 0  •  metoprolol succinate XL (TOPROL-XL) 50 MG 24 hr tablet, TK 1 T PO D, Disp: , Rfl: 3  •  montelukast (SINGULAIR) 10 MG tablet, Take 1 tablet by mouth Every Night., Disp: 90 tablet, Rfl: 3  •  predniSONE (DELTASONE) 5 MG tablet, Take 1 tablet by mouth Daily., Disp: 90 tablet, Rfl: 3  •  VITAMIN D PO, Take 1,500 mg by mouth Daily., Disp: , Rfl:   •  Apoaequorin (PREVAGEN) 10 MG capsule, Take 10 mg by mouth Daily., Disp: , Rfl:   •  furosemide (LASIX) 20 MG tablet, Take 1 tablet by mouth Daily., Disp: 90 tablet, Rfl: 3  •  ondansetron ODT (ZOFRAN ODT) 8 MG disintegrating tablet, Take 1 tablet by mouth Every 8 (Eight) Hours As Needed for Nausea or Vomiting., Disp: 15 tablet, Rfl: 0  •  potassium chloride (K-DUR,KLOR-CON) 10 MEQ CR tablet, Take 1 tablet by mouth Daily., Disp: 90 tablet, Rfl: 2      OBJECTIVE  Vitals:    03/06/20 0752   BP: 132/94   Pulse: 84   SpO2: 99%     Body mass index is 27.29 kg/m².    Diagnostics:  CTA head/neck 1/14/2020: IMPRESSION:  1. There is mild to moderate small vessel disease in the cerebral white  matter. The remainder of the head CT is normal.  2. Cervical spondylosis is present as described above with disc space  narrowing and degenerative endplate changes from C3 to C7 with reversal  of the normal " cervical lordosis centered from C3 to C5. Posterior  endplate spurs and uncovertebral joint hypertrophy contribute to mild to  moderate canal and left foraminal narrowing at C3-4, mild canal and left  foraminal narrowing at C4-5, and mild canal and mild to moderate  bilateral foraminal narrowing at C5-6 and C6-7.  3. Calcified plaque involves the posterior wall proximal left internal  carotid artery resulting in maximally 20% stenosis of the proximal left  internal carotid artery using the NASCET criteria. The remainder of the  CT angiogram of the head and neck is within normal limits  EKG 2/1/20: SR    Laboratory Results:         Lab Results   Component Value Date    WBC 6.04 02/01/2020    HGB 12.8 02/01/2020    HCT 38.4 02/01/2020    MCV 93.2 02/01/2020     02/01/2020     Lab Results   Component Value Date    GLUCOSE 108 (H) 02/01/2020    BUN 11 02/01/2020    CREATININE 1.14 (H) 02/01/2020    EGFRIFNONA 46 (L) 02/01/2020    EGFRIFAFRI 56 (L) 07/22/2019    BCR 9.6 02/01/2020    K 3.9 02/01/2020    CO2 28.4 02/01/2020    CALCIUM 9.2 02/01/2020    PROTENTOTREF 7.1 07/22/2019    ALBUMIN 4.30 02/01/2020    LABIL2 2.0 07/22/2019    AST 22 02/01/2020    ALT 15 02/01/2020     Brief Urine Lab Results  (Last result in the past 365 days)      Color   Clarity   Blood   Leuk Est   Nitrite   Protein   CREAT   Urine HCG        02/01/20 2128 Yellow Clear Trace Large (3+) Positive Negative               Physical Exam:  GENERAL: NAD  HEENT: Normocephalic, atraumatic   COR: RRR  Resp: Even and unlabored  Extremities: Decreased ROM of right hip.  Psychiatric: Labile mood, intermittently tearful and joking     Neurological:   MS: AO. Language normal. No neglect. Follows all commands.  CN: II-XII grossly normal.  No nystagmus noted  Motor: Normal strength and tone throughout except for right hip flexor secondary to to pain.   Sensory: Intact to light touch in arms and legs  Station and Gait: Able to ascend/descend from exam  table but with assistance.  Significant complaints of pain in right hip when ambulating, antalgic, unsteady without any assistive device.  Coordination: Normal finger to nose bilaterally    Impression:   Ms. Freitas presents today for f/u of headaches and dizziness.    1. Dizziness and balance issues: Dagnostic work-up including MRI brain, and recent CTA head/neck all unrevealing for etiology and thus central source for her dizziness less likely.  Her dizziness and imbalance have been chronic for several years and likely a combination of BPPV, orthostatic hypotension and also with hip/lumbar issues contributing to gait dysfunction.  She is currently getting PT, recommend continuing along with vestibular rehab as needed.  She may benefit from of an evaluation for her previous hip replacement as it seems to be causing her some significant pain and problems with balance.  We also discussed the importance of staying well-hydrated, transitioning slowly, consider use of compression stockings.    2. Chronic headaches: We again discussed possible pharmacologic options however patient and spouse are reluctant to add any new medications to her regimen.  We reviewed lifestyle modifications which she has implemented and appear to have lessened severity of headaches, suggested using Tylenol extra strength for pain but cautioned no more than 4 g in a 24-hour period.  Continue to stay well-hydrated, decrease caffeine and practice good sleep hygiene, consistent compliance with CPAP.    3. Memory loss: She is currently on aricept per PCP as well as Celexa. Her/anxiety likely partial contributor to her cognitive issues.  She may benefit from therapist.  We reviewed lifestyle modifications for cognitive health including regular physical activity, healthy diet, regular social engagement, regular mental activity.    She will f/u here in 6 months, sooner if symptoms warrant.  She and spouse voiced understanding and agreed with above  plan.    Greater than 50% of this 40-minute visit was spent counseling patient regarding diagnosis, review of diagnostics, medication treatment options including purpose, risk, benefits, possible side effects as well as recommended lifestyle modifications and preventative measures.    Margaret was seen today for headache, dizziness and gait problem.    Diagnoses and all orders for this visit:    Other headache syndrome    Dizziness    Depression, unspecified depression type    Mild cognitive impairment    Balance problem        Coding      Dictated using Dragon

## 2020-03-07 PROBLEM — R26.89 BALANCE PROBLEM: Status: ACTIVE | Noted: 2020-03-07

## 2020-03-07 NOTE — PATIENT INSTRUCTIONS
Lifestyle modifications for headache prevention:  Recommendations:   -Increase water intake, decrease caffeine  -Get plenty of rest, practice good sleep hygiene -- Consider sleep evaluation for BOOKER if indicated  -Maintain a stable daily schedule (going to sleep and waking up the same time each day; eating regular meals; maintaining a low stress lifestyle)  -Encouraged regular physical activity  -Minimize OTC use to avoid medication overuse headaches     Lifestyle modifications for cognitive health:  Recommendations:  -Aggressive control of vascular risk factors (blood pressure, cholesterol, diabetes)  -Regular physical activity as able  -Regular mental activity (ie puzzles, hobbies, etc)  -Healthy diet (ie Mediterranean,DASH diet)  -Engage in regular social activity

## 2020-03-12 ENCOUNTER — OFFICE VISIT (OUTPATIENT)
Dept: INTERNAL MEDICINE | Facility: CLINIC | Age: 80
End: 2020-03-12

## 2020-03-12 DIAGNOSIS — R07.89 COSTOCHONDRAL CHEST PAIN: ICD-10-CM

## 2020-03-12 DIAGNOSIS — R42 DIZZINESS: ICD-10-CM

## 2020-03-12 DIAGNOSIS — G31.84 MILD COGNITIVE IMPAIRMENT: ICD-10-CM

## 2020-03-12 DIAGNOSIS — R26.89 BALANCE PROBLEM: Primary | ICD-10-CM

## 2020-03-12 PROCEDURE — 99215 OFFICE O/P EST HI 40 MIN: CPT | Performed by: INTERNAL MEDICINE

## 2020-03-12 NOTE — PROGRESS NOTES
"  Assessment and Plan  Margaret was seen today for balance issues and dizziness.    Diagnoses and all orders for this visit:    Balance problem  Comments:  This is acute but persisten, potentially related to some head trauma.  She is using rolator but is upset.  Will ask Dr. Diamond for imput.  No help from PT.    Dizziness  Comments:  Related to above, don't think related to her adrenal issues, tried increasing steroids when this first happened.   Orders:  -     Ambulatory Referral to Neurology    Mild cognitive impairment  Comments:  stable or maybe mildly worsened.  Has excellent family support.  u/a negative.    Costochondral chest pain  Comments:  No trauma reported- recommmend rest and follow for now.    I spent 55 minutes with patient and family reviewed records and complaints today.  Over half  the time was spent in coordinating care and counseling about providing safe environment, reassurance and planning.   F/U and Patient Instructions    Return in about 4 weeks (around 4/9/2020).  There are no Patient Instructions on file for this visit.    Subjective    Margaret Alexander is a 79 y.o. female being seen in our office today for Balance Issues and Dizziness     History of the Present Illness  HPI Here to f/u her dizziness and off balance.  She has been seen by PT but that isn't helping.  She says she dizzy with a head pressure all of the time, does get worse when she moves her head around but does try to remain active.  She is eating and drinking well.  This all started when she hit her head in November when she got out of bed- hit on a door frame.  Pain has been mainly on the R side where she hit.  She does say that sometimes there is some \"beating\" of things hanging on a wall.  She is using her walker strictly for balance- doesn't go around adult living community without her  which is unlike her.  There are no falls although she has a fear of the same.  Also having 2 weeks of tenderness in the L " anterior CW, doesn't always bother her to move arms around but tender to touch- mainly of L breast to axilla.  Per , cognition has slowly declined- she does not leave him much.  Her plan when they moved to the [facility] was to become very active in the community.      Patient History        Significant Past History  The following portions of the patient's history were reviewed and updated as appropriate:PMHroutine: Social history , Allergies, Current Medications, Active Problem List and Health Maintenance              Social History  She  reports that she has never smoked. She has never used smokeless tobacco. She reports that she drank alcohol. She reports that she does not use drugs.                         Review of Symptoms  Review of Systems   Constitution: Negative for decreased appetite and weight loss.   HENT: Negative.    Eyes: Visual disturbance: occ beating.   Cardiovascular: Positive for chest pain.   Respiratory: Negative.    Skin: Negative.    Gastrointestinal: Negative.    Genitourinary: Negative.    Neurological: Positive for disturbances in coordination, dizziness and loss of balance (no falls). Negative for headaches, seizures, sensory change, tremors and vertigo.   Psychiatric/Behavioral: Positive for depression (related to current issues primarily).     Objective  Vital Signs         BP Readings from Last 1 Encounters:   03/12/20 122/56     Wt Readings from Last 3 Encounters:   03/12/20 68.9 kg (152 lb)   03/06/20 69.9 kg (154 lb)   02/01/20 70.3 kg (155 lb)   Body mass index is 26.93 kg/m².          Physical Exam   Physical Exam   Constitutional: No distress.   Eyes: Conjunctivae are normal.   Neck: No thyromegaly present.   Cardiovascular: Normal rate. An irregularly irregular rhythm present.   Pulmonary/Chest: Effort normal and breath sounds normal. She exhibits bony tenderness. She exhibits no mass. Left breast exhibits no inverted nipple, no mass, no nipple discharge, no skin  change and no tenderness.   Abdominal: Soft. Bowel sounds are normal. She exhibits no distension. There is no tenderness.   Musculoskeletal:   Tender along lower L costochondral margin and less so in LUQ of breast to axilla.    Lymphadenopathy:     She has no cervical adenopathy.   Neurological: She is alert. She displays normal reflexes. No cranial nerve deficit. She exhibits normal muscle tone. Coordination normal.   Her mental status is actually good when I am with her, is is oriented and able to answer questions appropriately   Psychiatric: She has a normal mood and affect. Her speech is normal and behavior is normal. Thought content normal. Cognition and memory are normal.   Becomes teary easily       Data Reviewed    Recent Results (from the past 2016 hour(s))   POC Creatinine    Collection Time: 01/14/20  2:11 PM   Result Value Ref Range    Creatinine 0.80 0.60 - 1.30 mg/dL   Comprehensive Metabolic Panel    Collection Time: 02/01/20  8:05 PM   Result Value Ref Range    Glucose 108 (H) 65 - 99 mg/dL    BUN 11 8 - 23 mg/dL    Creatinine 1.14 (H) 0.57 - 1.00 mg/dL    Sodium 141 136 - 145 mmol/L    Potassium 3.9 3.5 - 5.2 mmol/L    Chloride 101 98 - 107 mmol/L    CO2 28.4 22.0 - 29.0 mmol/L    Calcium 9.2 8.6 - 10.5 mg/dL    Total Protein 6.4 6.0 - 8.5 g/dL    Albumin 4.30 3.50 - 5.20 g/dL    ALT (SGPT) 15 1 - 33 U/L    AST (SGOT) 22 1 - 32 U/L    Alkaline Phosphatase 80 39 - 117 U/L    Total Bilirubin 1.2 0.2 - 1.2 mg/dL    eGFR Non African Amer 46 (L) >60 mL/min/1.73    Globulin 2.1 gm/dL    A/G Ratio 2.0 g/dL    BUN/Creatinine Ratio 9.6 7.0 - 25.0    Anion Gap 11.6 5.0 - 15.0 mmol/L   Troponin    Collection Time: 02/01/20  8:05 PM   Result Value Ref Range    Troponin T <0.010 0.000 - 0.030 ng/mL   CBC Auto Differential    Collection Time: 02/01/20  8:05 PM   Result Value Ref Range    WBC 6.04 3.40 - 10.80 10*3/mm3    RBC 4.12 3.77 - 5.28 10*6/mm3    Hemoglobin 12.8 12.0 - 15.9 g/dL    Hematocrit 38.4 34.0 -  46.6 %    MCV 93.2 79.0 - 97.0 fL    MCH 31.1 26.6 - 33.0 pg    MCHC 33.3 31.5 - 35.7 g/dL    RDW 13.1 12.3 - 15.4 %    RDW-SD 44.0 37.0 - 54.0 fl    MPV 11.0 6.0 - 12.0 fL    Platelets 186 140 - 450 10*3/mm3    Neutrophil % 67.0 42.7 - 76.0 %    Lymphocyte % 24.0 19.6 - 45.3 %    Monocyte % 7.5 5.0 - 12.0 %    Eosinophil % 0.7 0.3 - 6.2 %    Basophil % 0.5 0.0 - 1.5 %    Immature Grans % 0.3 0.0 - 0.5 %    Neutrophils, Absolute 4.05 1.70 - 7.00 10*3/mm3    Lymphocytes, Absolute 1.45 0.70 - 3.10 10*3/mm3    Monocytes, Absolute 0.45 0.10 - 0.90 10*3/mm3    Eosinophils, Absolute 0.04 0.00 - 0.40 10*3/mm3    Basophils, Absolute 0.03 0.00 - 0.20 10*3/mm3    Immature Grans, Absolute 0.02 0.00 - 0.05 10*3/mm3    nRBC 0.0 0.0 - 0.2 /100 WBC   Urinalysis With Microscopic If Indicated (No Culture) - Urine, Clean Catch    Collection Time: 02/01/20  9:28 PM   Result Value Ref Range    Color, UA Yellow Yellow, Straw    Appearance, UA Clear Clear    pH, UA 7.0 5.0 - 8.0    Specific Gravity, UA 1.011 1.005 - 1.030    Glucose, UA Negative Negative    Ketones, UA Negative Negative    Bilirubin, UA Negative Negative    Blood, UA Trace (A) Negative    Protein, UA Negative Negative    Leuk Esterase, UA Large (3+) (A) Negative    Nitrite, UA Positive (A) Negative    Urobilinogen, UA 0.2 E.U./dL 0.2 - 1.0 E.U./dL   Urinalysis, Microscopic Only - Urine, Clean Catch    Collection Time: 02/01/20  9:28 PM   Result Value Ref Range    RBC, UA 0-2 None Seen, 0-2 /HPF    WBC, UA 21-30 (A) None Seen, 0-2 /HPF    Bacteria, UA 4+ (A) None Seen /HPF    Squamous Epithelial Cells, UA 3-6 (A) None Seen, 0-2 /HPF    Transitional Epithelial Cells, UA 0-2 0 - 2 /HPF    Hyaline Casts, UA 3-6 None Seen /LPF    Methodology Automated Microscopy    Urine Culture - Urine, Urine, Clean Catch    Collection Time: 02/12/20  3:10 PM   Result Value Ref Range    Urine Culture Final report (A)     Result 1 Klebsiella pneumoniae (A)     Susceptibility Testing Comment       Advance Care Planning   ACP discussion was held with the patient during this visit. Patient has an advance directive (not in EMR), copy requested.

## 2020-03-13 ENCOUNTER — TELEPHONE (OUTPATIENT)
Dept: INTERNAL MEDICINE | Facility: CLINIC | Age: 80
End: 2020-03-13

## 2020-03-13 NOTE — TELEPHONE ENCOUNTER
Javy called from [facility] OT and states that patient is self DC at this time. She wants to put a hold on therapy until she is seen by another Neurologist. This was just RAFA

## 2020-03-15 VITALS
DIASTOLIC BLOOD PRESSURE: 56 MMHG | HEIGHT: 63 IN | BODY MASS INDEX: 26.93 KG/M2 | HEART RATE: 70 BPM | WEIGHT: 152 LBS | SYSTOLIC BLOOD PRESSURE: 122 MMHG

## 2020-05-21 ENCOUNTER — OFFICE VISIT (OUTPATIENT)
Dept: INTERNAL MEDICINE | Facility: CLINIC | Age: 80
End: 2020-05-21

## 2020-05-21 VITALS
WEIGHT: 154 LBS | DIASTOLIC BLOOD PRESSURE: 74 MMHG | TEMPERATURE: 97.5 F | HEART RATE: 88 BPM | HEIGHT: 63 IN | SYSTOLIC BLOOD PRESSURE: 140 MMHG | BODY MASS INDEX: 27.29 KG/M2

## 2020-05-21 DIAGNOSIS — Z12.31 VISIT FOR SCREENING MAMMOGRAM: ICD-10-CM

## 2020-05-21 DIAGNOSIS — R07.89 CHEST WALL PAIN: Primary | ICD-10-CM

## 2020-05-21 DIAGNOSIS — I10 ESSENTIAL HYPERTENSION: ICD-10-CM

## 2020-05-21 PROCEDURE — 99214 OFFICE O/P EST MOD 30 MIN: CPT | Performed by: INTERNAL MEDICINE

## 2020-05-21 NOTE — PROGRESS NOTES
Assessment and Plan  Margaret was seen today for cough and balance issues.    Diagnoses and all orders for this visit:    Chest wall pain  Comments:  check CT this is a new, abnormal finding.    Orders:  -     CT Chest Without Contrast; Future    Visit for screening mammogram  Comments:  check mammmogram given CW complaints  Orders:  -     Mammo Screening Bilateral With CAD    Essential hypertension  Comments:  controlled, no change.      F/U and Patient Instructions    Return in about 2 months (around 7/21/2020).  There are no Patient Instructions on file for this visit.    Subjective    Margaret Alexander is a 79 y.o. female being seen in our office today for Cough and Balance Issues     History of the Present Illness  HPI Here with her - she says her worst problem is feeling off balance.  Uses walker all of the time.  No falls with it.   Persistent pain in the R breast area- some increased tenderness when she takes a deep breath.   Eating and drinking well.  Saw Dr. Curiel in January- all labs normal.      Patient History        Significant Past History  The following portions of the patient's history were reviewed and updated as appropriate:PMHroutine: Social history , Allergies, Current Medications, Active Problem List and Health Maintenance              Social History  She  reports that she has never smoked. She has never used smokeless tobacco. She reports that she drank alcohol. She reports that she does not use drugs.                         Review of Symptoms  Review of Systems   Constitution: Negative.   HENT: Negative.    Cardiovascular: Positive for chest pain. Negative for leg swelling.   Respiratory: Negative.    Gastrointestinal: Negative.    Genitourinary: Negative.    Neurological: Positive for loss of balance.   Psychiatric/Behavioral: Negative.      Objective  Vital Signs         BP Readings from Last 1 Encounters:   05/21/20 140/74     Wt Readings from Last 3 Encounters:   05/21/20  69.9 kg (154 lb)   03/12/20 68.9 kg (152 lb)   03/06/20 69.9 kg (154 lb)   Body mass index is 27.29 kg/m².          Physical Exam   Physical Exam   Constitutional: No distress.   Cardiovascular: Normal rate, regular rhythm, normal heart sounds and intact distal pulses.   Pulmonary/Chest: Effort normal and breath sounds normal. No respiratory distress. Right breast exhibits no inverted nipple, no mass, no nipple discharge, no skin change and no tenderness.   Musculoskeletal:   Tenderness with guarding along R anterior CW and sternum above breast.   Skin: Skin is warm and dry.     Data Reviewed    No results found for this or any previous visit (from the past 2016 hour(s)).

## 2020-06-12 RX ORDER — DONEPEZIL HYDROCHLORIDE 10 MG/1
10 TABLET, FILM COATED ORAL NIGHTLY
Qty: 90 TABLET | Refills: 3 | Status: SHIPPED | OUTPATIENT
Start: 2020-06-12 | End: 2021-04-12

## 2020-06-19 ENCOUNTER — HOSPITAL ENCOUNTER (OUTPATIENT)
Dept: MAMMOGRAPHY | Facility: HOSPITAL | Age: 80
Discharge: HOME | End: 2020-06-19
Admitting: INTERNAL MEDICINE

## 2020-06-19 ENCOUNTER — HOSPITAL ENCOUNTER (OUTPATIENT)
Dept: CT IMAGING | Facility: HOSPITAL | Age: 80
Discharge: HOME | End: 2020-06-19

## 2020-06-19 DIAGNOSIS — R07.89 CHEST WALL PAIN: ICD-10-CM

## 2020-06-19 PROCEDURE — 77067 SCR MAMMO BI INCL CAD: CPT

## 2020-06-19 PROCEDURE — 77063 BREAST TOMOSYNTHESIS BI: CPT

## 2020-06-19 PROCEDURE — 71250 CT THORAX DX C-: CPT

## 2020-06-23 ENCOUNTER — TELEPHONE (OUTPATIENT)
Dept: INTERNAL MEDICINE | Facility: CLINIC | Age: 80
End: 2020-06-23

## 2020-06-23 NOTE — TELEPHONE ENCOUNTER
Patient called needing a statement sent to [facility] [employer] [facility] Alexandre Quiros [address] [place] [id number] stating she isn't driving anymore due to medical reasons, please mail the patient a copy of this letter for her file.

## 2020-07-21 ENCOUNTER — OFFICE VISIT (OUTPATIENT)
Dept: INTERNAL MEDICINE | Facility: CLINIC | Age: 80
End: 2020-07-21

## 2020-07-21 VITALS — WEIGHT: 156 LBS | HEIGHT: 63 IN | TEMPERATURE: 97.1 F | BODY MASS INDEX: 27.64 KG/M2

## 2020-07-21 DIAGNOSIS — R91.8 MULTIPLE LUNG NODULES ON CT: ICD-10-CM

## 2020-07-21 DIAGNOSIS — K21.9 LARYNGOPHARYNGEAL REFLUX: ICD-10-CM

## 2020-07-21 DIAGNOSIS — G31.84 MILD COGNITIVE IMPAIRMENT: ICD-10-CM

## 2020-07-21 DIAGNOSIS — I10 ESSENTIAL HYPERTENSION: ICD-10-CM

## 2020-07-21 DIAGNOSIS — R07.89 CHEST WALL PAIN: Primary | ICD-10-CM

## 2020-07-21 DIAGNOSIS — E27.40 ADRENAL INSUFFICIENCY (HCC): ICD-10-CM

## 2020-07-21 PROCEDURE — 99214 OFFICE O/P EST MOD 30 MIN: CPT | Performed by: INTERNAL MEDICINE

## 2020-07-21 RX ORDER — PANTOPRAZOLE SODIUM 40 MG/1
40 TABLET, DELAYED RELEASE ORAL DAILY
Qty: 90 TABLET | Refills: 0 | Status: SHIPPED | OUTPATIENT
Start: 2020-07-21 | End: 2020-10-19 | Stop reason: SDUPTHER

## 2020-07-21 NOTE — PROGRESS NOTES
Assessment and Plan  Margaret was seen today for memory loss and cough.    Diagnoses and all orders for this visit:    Chest wall pain  Comments:  may be from the coughing although hasn't changed much since last visit-  will try to improve cough.    Laryngopharyngeal reflux  Comments:  d/c Pepcid, use pantoprazole instead-   Orders:  -     pantoprazole (PROTONIX) 40 MG EC tablet; Take 1 tablet by mouth Daily.    Essential hypertension  Comments:  no elevated readings.    Mild cognitive impairment  Comments:  stable to slightly worsened- perhaps from lack of socialization at living facility due to virus.    Adrenal insufficiency (CMS/HCC)  Comments:  She did not improve sev months ago on increased prednisone.  Has f/u with Dr. Curiel soon, consider increasing doses is other eval negative.     Multiple lung nodules on CT  Comments:  repeact T- not palpable.  Orders:  -     CT Chest Without Contrast; Future      F/U and Patient Instructions    Return in about 2 months (around 9/21/2020).  There are no Patient Instructions on file for this visit.    Subjective    Margaret Alexander is a 79 y.o. female being seen in our office today for Memory Loss and Cough     History of the Present Illness  HPI  Says she is feeling much better- she has less dizziness, etc. She is trying to use cane more than walker.   She has a cough that Dr. Blackburn thinks is reflux related- he put her on Pepcid BID but it doesn't seem to be helping as it did in the past.    says she is complaining of some anterior CW tenderness- she thinks this is worsened by the cough.  She does cough during the night.  Some increase in fatigue, associated with lightheadedness.      Patient History        Significant Past History  The following portions of the patient's history were reviewed and updated as appropriate:PMHroutine: Social history , Allergies, Current Medications, Active Problem List and Health Maintenance              Social History  She   reports that she has never smoked. She has never used smokeless tobacco. She reports that she drank alcohol. She reports that she does not use drugs.                         Review of Symptoms  Review of Systems   Constitution: Negative.   HENT: Positive for hoarse voice. Negative for congestion, odynophagia and sore throat.    Cardiovascular: Positive for chest pain. Negative for leg swelling.   Respiratory: Positive for cough. Negative for shortness of breath.    Musculoskeletal: Negative.    Gastrointestinal: Negative for change in bowel habit.   Genitourinary: Negative.    Neurological: Positive for dizziness. Negative for focal weakness.     Objective  Vital Signs         BP Readings from Last 1 Encounters:   05/21/20 140/74     Wt Readings from Last 3 Encounters:   07/21/20 70.8 kg (156 lb)   05/21/20 69.9 kg (154 lb)   03/12/20 68.9 kg (152 lb)   Body mass index is 27.64 kg/m².          Physical Exam   Physical Exam   Constitutional: No distress.   Does frequently clear throat   Cardiovascular: Normal rate and regular rhythm.   Pulmonary/Chest: Effort normal and breath sounds normal. Tenderness: tender R and L lateral anterior CW, no costochondral tenderness.   Musculoskeletal: She exhibits no edema.   Lymphadenopathy:     She has no cervical adenopathy.   Skin: Skin is warm and dry.     Data Reviewed    No results found for this or any previous visit (from the past 2016 hour(s)).

## 2020-08-21 ENCOUNTER — HOSPITAL ENCOUNTER (OUTPATIENT)
Dept: CT IMAGING | Facility: HOSPITAL | Age: 80
Discharge: HOME | End: 2020-08-21
Admitting: INTERNAL MEDICINE

## 2020-08-21 DIAGNOSIS — R91.8 MULTIPLE LUNG NODULES ON CT: ICD-10-CM

## 2020-08-21 PROCEDURE — 71250 CT THORAX DX C-: CPT

## 2020-09-14 RX ORDER — CITALOPRAM 20 MG/1
TABLET ORAL
Qty: 90 TABLET | Refills: 0 | Status: SHIPPED | OUTPATIENT
Start: 2020-09-14 | End: 2021-03-19

## 2020-09-14 RX ORDER — POTASSIUM CHLORIDE 750 MG/1
TABLET, EXTENDED RELEASE ORAL
Qty: 90 TABLET | Refills: 0 | Status: SHIPPED | OUTPATIENT
Start: 2020-09-14 | End: 2020-12-18 | Stop reason: SDUPTHER

## 2020-10-19 DIAGNOSIS — K21.9 LARYNGOPHARYNGEAL REFLUX: ICD-10-CM

## 2020-10-19 RX ORDER — PANTOPRAZOLE SODIUM 40 MG/1
40 TABLET, DELAYED RELEASE ORAL DAILY
Qty: 90 TABLET | Refills: 0 | Status: ON HOLD | OUTPATIENT
Start: 2020-10-19 | End: 2020-10-22

## 2020-10-19 NOTE — TELEPHONE ENCOUNTER
Caller: Margaret Wilson    Relationship: Self    Best call back number: [phone]    Medication needed:   Requested Prescriptions     Pending Prescriptions Disp Refills   • pantoprazole (PROTONIX) 40 MG EC tablet 90 tablet 0     Sig: Take 1 tablet by mouth Daily.       When do you need the refill by: ASAP    What details did the patient provide when requesting the medication: PATIENT IS COMPLETELY OUT OF MEDICATION.     Does the patient have less than a 3 day supply:  [x] Yes  [] No    What is the patient's preferred pharmacy: [facility] - [place], [place] - [address] AT PORTAL TO REGISTERED [facility] - [phone]  - [phone] FX

## 2020-10-22 ENCOUNTER — APPOINTMENT (OUTPATIENT)
Dept: GENERAL RADIOLOGY | Facility: HOSPITAL | Age: 80
End: 2020-10-22

## 2020-10-22 ENCOUNTER — HOSPITAL ENCOUNTER (INPATIENT)
Facility: HOSPITAL | Age: 80
LOS: 3 days | Discharge: HOME | End: 2020-10-25
Attending: EMERGENCY MEDICINE | Admitting: INTERNAL MEDICINE

## 2020-10-22 ENCOUNTER — APPOINTMENT (OUTPATIENT)
Dept: CARDIOLOGY | Facility: HOSPITAL | Age: 80
End: 2020-10-22

## 2020-10-22 DIAGNOSIS — E05.90 HYPERTHYROIDISM: ICD-10-CM

## 2020-10-22 DIAGNOSIS — I48.91 ATRIAL FIBRILLATION WITH RVR (HCC): Primary | ICD-10-CM

## 2020-10-22 DIAGNOSIS — R06.00 DYSPNEA, UNSPECIFIED TYPE: ICD-10-CM

## 2020-10-22 DIAGNOSIS — N39.0 ACUTE UTI: ICD-10-CM

## 2020-10-22 PROBLEM — E05.80 IATROGENIC HYPERTHYROIDISM: Status: ACTIVE | Noted: 2020-10-22

## 2020-10-22 LAB
ALBUMIN SERPL-MCNC: 4.5 G/DL (ref 3.5–5.2)
ALBUMIN/GLOB SERPL: 2 G/DL
ALP SERPL-CCNC: 113 U/L (ref 39–117)
ALT SERPL W P-5'-P-CCNC: 19 U/L (ref 1–33)
ANION GAP SERPL CALCULATED.3IONS-SCNC: 9.3 MMOL/L (ref 5–15)
AORTIC DIMENSIONLESS INDEX: 0.7 (DI)
APTT PPP: 27.6 SECONDS (ref 22.7–35.4)
AST SERPL-CCNC: 22 U/L (ref 1–32)
B PARAPERT DNA SPEC QL NAA+PROBE: NOT DETECTED
B PERT DNA SPEC QL NAA+PROBE: NOT DETECTED
BACTERIA UR QL AUTO: ABNORMAL /HPF
BASOPHILS # BLD AUTO: 0.03 10*3/MM3 (ref 0–0.2)
BASOPHILS NFR BLD AUTO: 0.4 % (ref 0–1.5)
BH CV ECHO MEAS - ACS: 1.7 CM
BH CV ECHO MEAS - AO MAX PG (FULL): 8.9 MMHG
BH CV ECHO MEAS - AO MAX PG: 15.2 MMHG
BH CV ECHO MEAS - AO MEAN PG (FULL): 3 MMHG
BH CV ECHO MEAS - AO MEAN PG: 6 MMHG
BH CV ECHO MEAS - AO ROOT AREA (BSA CORRECTED): 1.3
BH CV ECHO MEAS - AO ROOT AREA: 4.5 CM^2
BH CV ECHO MEAS - AO ROOT DIAM: 2.4 CM
BH CV ECHO MEAS - AO V2 MAX: 195 CM/SEC
BH CV ECHO MEAS - AO V2 MEAN: 114 CM/SEC
BH CV ECHO MEAS - AO V2 VTI: 32.1 CM
BH CV ECHO MEAS - ASC AORTA: 2.7 CM
BH CV ECHO MEAS - AVA(I,A): 2.1 CM^2
BH CV ECHO MEAS - AVA(I,D): 2.1 CM^2
BH CV ECHO MEAS - AVA(V,A): 2 CM^2
BH CV ECHO MEAS - AVA(V,D): 2 CM^2
BH CV ECHO MEAS - BSA(HAYCOCK): 1.8 M^2
BH CV ECHO MEAS - BSA: 1.8 M^2
BH CV ECHO MEAS - BZI_BMI: 27.6 KILOGRAMS/M^2
BH CV ECHO MEAS - BZI_METRIC_HEIGHT: 162.6 CM
BH CV ECHO MEAS - BZI_METRIC_WEIGHT: 73 KG
BH CV ECHO MEAS - EDV(CUBED): 85.2 ML
BH CV ECHO MEAS - EDV(MOD-SP2): 70 ML
BH CV ECHO MEAS - EDV(MOD-SP4): 87 ML
BH CV ECHO MEAS - EDV(TEICH): 87.7 ML
BH CV ECHO MEAS - EF(CUBED): 74.2 %
BH CV ECHO MEAS - EF(MOD-BP): 49.2 %
BH CV ECHO MEAS - EF(MOD-SP2): 48.6 %
BH CV ECHO MEAS - EF(MOD-SP4): 48.3 %
BH CV ECHO MEAS - EF(TEICH): 66.3 %
BH CV ECHO MEAS - ESV(CUBED): 22 ML
BH CV ECHO MEAS - ESV(MOD-SP2): 36 ML
BH CV ECHO MEAS - ESV(MOD-SP4): 45 ML
BH CV ECHO MEAS - ESV(TEICH): 29.6 ML
BH CV ECHO MEAS - FS: 36.4 %
BH CV ECHO MEAS - IVS/LVPW: 0.91
BH CV ECHO MEAS - IVSD: 1 CM
BH CV ECHO MEAS - LV DIASTOLIC VOL/BSA (35-75): 48.8 ML/M^2
BH CV ECHO MEAS - LV MASS(C)D: 158.2 GRAMS
BH CV ECHO MEAS - LV MASS(C)DI: 88.7 GRAMS/M^2
BH CV ECHO MEAS - LV MAX PG: 6.4 MMHG
BH CV ECHO MEAS - LV MEAN PG: 3 MMHG
BH CV ECHO MEAS - LV SYSTOLIC VOL/BSA (12-30): 25.2 ML/M^2
BH CV ECHO MEAS - LV V1 MAX: 126 CM/SEC
BH CV ECHO MEAS - LV V1 MEAN: 80.2 CM/SEC
BH CV ECHO MEAS - LV V1 VTI: 21.9 CM
BH CV ECHO MEAS - LVIDD: 4.4 CM
BH CV ECHO MEAS - LVIDS: 2.8 CM
BH CV ECHO MEAS - LVLD AP2: 6.4 CM
BH CV ECHO MEAS - LVLD AP4: 6.1 CM
BH CV ECHO MEAS - LVLS AP2: 5.2 CM
BH CV ECHO MEAS - LVLS AP4: 5.4 CM
BH CV ECHO MEAS - LVOT AREA (M): 3.1 CM^2
BH CV ECHO MEAS - LVOT AREA: 3.1 CM^2
BH CV ECHO MEAS - LVOT DIAM: 2 CM
BH CV ECHO MEAS - LVPWD: 1.1 CM
BH CV ECHO MEAS - MV DEC SLOPE: 1141 CM/SEC^2
BH CV ECHO MEAS - MV DEC TIME: 250 SEC
BH CV ECHO MEAS - MV E MAX VEL: 223 CM/SEC
BH CV ECHO MEAS - MV MAX PG: 29.2 MMHG
BH CV ECHO MEAS - MV MEAN PG: 14 MMHG
BH CV ECHO MEAS - MV P1/2T MAX VEL: 273 CM/SEC
BH CV ECHO MEAS - MV P1/2T: 70.1 MSEC
BH CV ECHO MEAS - MV V2 MAX: 270 CM/SEC
BH CV ECHO MEAS - MV V2 MEAN: 173 CM/SEC
BH CV ECHO MEAS - MV V2 VTI: 51 CM
BH CV ECHO MEAS - MVA P1/2T LCG: 0.81 CM^2
BH CV ECHO MEAS - MVA(P1/2T): 3.1 CM^2
BH CV ECHO MEAS - MVA(VTI): 1.3 CM^2
BH CV ECHO MEAS - PA ACC TIME: 0.05 SEC
BH CV ECHO MEAS - PA MAX PG (FULL): 2.1 MMHG
BH CV ECHO MEAS - PA MAX PG: 5.6 MMHG
BH CV ECHO MEAS - PA PR(ACCEL): 55.2 MMHG
BH CV ECHO MEAS - PA V2 MAX: 118 CM/SEC
BH CV ECHO MEAS - PI END-D VEL: 85.5 CM/SEC
BH CV ECHO MEAS - PULM DIAS VEL: 62.8 CM/SEC
BH CV ECHO MEAS - PULM S/D: 0.65
BH CV ECHO MEAS - PULM SYS VEL: 40.9 CM/SEC
BH CV ECHO MEAS - PVA(V,A): 2.2 CM^2
BH CV ECHO MEAS - PVA(V,D): 2.2 CM^2
BH CV ECHO MEAS - QP/QS: 0.57
BH CV ECHO MEAS - RAP SYSTOLE: 3 MMHG
BH CV ECHO MEAS - RV MAX PG: 3.4 MMHG
BH CV ECHO MEAS - RV MEAN PG: 1 MMHG
BH CV ECHO MEAS - RV V1 MAX: 92.7 CM/SEC
BH CV ECHO MEAS - RV V1 MEAN: 52.2 CM/SEC
BH CV ECHO MEAS - RV V1 VTI: 13.9 CM
BH CV ECHO MEAS - RVOT AREA: 2.8 CM^2
BH CV ECHO MEAS - RVOT DIAM: 1.9 CM
BH CV ECHO MEAS - RVSP: 25.8 MMHG
BH CV ECHO MEAS - SI(AO): 81.4 ML/M^2
BH CV ECHO MEAS - SI(CUBED): 35.4 ML/M^2
BH CV ECHO MEAS - SI(LVOT): 38.6 ML/M^2
BH CV ECHO MEAS - SI(MOD-SP2): 19.1 ML/M^2
BH CV ECHO MEAS - SI(MOD-SP4): 23.5 ML/M^2
BH CV ECHO MEAS - SI(TEICH): 32.6 ML/M^2
BH CV ECHO MEAS - SV(AO): 145.2 ML
BH CV ECHO MEAS - SV(CUBED): 63.2 ML
BH CV ECHO MEAS - SV(LVOT): 68.8 ML
BH CV ECHO MEAS - SV(MOD-SP2): 34 ML
BH CV ECHO MEAS - SV(MOD-SP4): 42 ML
BH CV ECHO MEAS - SV(RVOT): 39.4 ML
BH CV ECHO MEAS - SV(TEICH): 58.1 ML
BH CV ECHO MEAS - TAPSE (>1.6): 1.3 CM
BH CV ECHO MEAS - TR MAX VEL: 239 CM/SEC
BH CV VAS BP RIGHT ARM: NORMAL MMHG
BH CV XLRA - RV BASE: 3.3 CM
BH CV XLRA - RV LENGTH: 6.3 CM
BH CV XLRA - RV MID: 2.2 CM
BH CV XLRA - TDI S': 8.2 CM/SEC
BILIRUB SERPL-MCNC: 2.2 MG/DL (ref 0–1.2)
BILIRUB UR QL STRIP: NEGATIVE
BUN SERPL-MCNC: 8 MG/DL (ref 8–23)
BUN/CREAT SERPL: 11.4 (ref 7–25)
C PNEUM DNA NPH QL NAA+NON-PROBE: NOT DETECTED
CALCIUM SPEC-SCNC: 9.9 MG/DL (ref 8.6–10.5)
CHLORIDE SERPL-SCNC: 106 MMOL/L (ref 98–107)
CLARITY UR: ABNORMAL
CO2 SERPL-SCNC: 27.7 MMOL/L (ref 22–29)
COLOR UR: ABNORMAL
CREAT SERPL-MCNC: 0.7 MG/DL (ref 0.57–1)
D-LACTATE SERPL-SCNC: 1.7 MMOL/L (ref 0.5–2)
DEPRECATED RDW RBC AUTO: 41.4 FL (ref 37–54)
EOSINOPHIL # BLD AUTO: 0.01 10*3/MM3 (ref 0–0.4)
EOSINOPHIL NFR BLD AUTO: 0.1 % (ref 0.3–6.2)
ERYTHROCYTE [DISTWIDTH] IN BLOOD BY AUTOMATED COUNT: 12.8 % (ref 12.3–15.4)
FLUAV SUBTYP SPEC NAA+PROBE: NOT DETECTED
FLUBV RNA ISLT QL NAA+PROBE: NOT DETECTED
GFR SERPL CREATININE-BSD FRML MDRD: 80 ML/MIN/1.73
GLOBULIN UR ELPH-MCNC: 2.3 GM/DL
GLUCOSE SERPL-MCNC: 126 MG/DL (ref 65–99)
GLUCOSE UR STRIP-MCNC: NEGATIVE MG/DL
HADV DNA SPEC NAA+PROBE: NOT DETECTED
HCOV 229E RNA SPEC QL NAA+PROBE: NOT DETECTED
HCOV HKU1 RNA SPEC QL NAA+PROBE: NOT DETECTED
HCOV NL63 RNA SPEC QL NAA+PROBE: NOT DETECTED
HCOV OC43 RNA SPEC QL NAA+PROBE: NOT DETECTED
HCT VFR BLD AUTO: 42.8 % (ref 34–46.6)
HGB BLD-MCNC: 14.1 G/DL (ref 12–15.9)
HGB UR QL STRIP.AUTO: ABNORMAL
HMPV RNA NPH QL NAA+NON-PROBE: NOT DETECTED
HOLD SPECIMEN: NORMAL
HOLD SPECIMEN: NORMAL
HPIV1 RNA SPEC QL NAA+PROBE: NOT DETECTED
HPIV2 RNA SPEC QL NAA+PROBE: NOT DETECTED
HPIV3 RNA NPH QL NAA+PROBE: NOT DETECTED
HPIV4 P GENE NPH QL NAA+PROBE: NOT DETECTED
HYALINE CASTS UR QL AUTO: ABNORMAL /LPF
IMM GRANULOCYTES # BLD AUTO: 0.04 10*3/MM3 (ref 0–0.05)
IMM GRANULOCYTES NFR BLD AUTO: 0.5 % (ref 0–0.5)
INR PPP: 1.08 (ref 0.9–1.1)
KETONES UR QL STRIP: ABNORMAL
LEFT ATRIUM VOLUME INDEX: 34 ML/M2
LEUKOCYTE ESTERASE UR QL STRIP.AUTO: ABNORMAL
LYMPHOCYTES # BLD AUTO: 0.82 10*3/MM3 (ref 0.7–3.1)
LYMPHOCYTES NFR BLD AUTO: 10 % (ref 19.6–45.3)
M PNEUMO IGG SER IA-ACNC: NOT DETECTED
MAGNESIUM SERPL-MCNC: 2 MG/DL (ref 1.6–2.4)
MCH RBC QN AUTO: 29.4 PG (ref 26.6–33)
MCHC RBC AUTO-ENTMCNC: 32.9 G/DL (ref 31.5–35.7)
MCV RBC AUTO: 89.4 FL (ref 79–97)
MONOCYTES # BLD AUTO: 0.49 10*3/MM3 (ref 0.1–0.9)
MONOCYTES NFR BLD AUTO: 6 % (ref 5–12)
NEUTROPHILS NFR BLD AUTO: 6.78 10*3/MM3 (ref 1.7–7)
NEUTROPHILS NFR BLD AUTO: 83 % (ref 42.7–76)
NITRITE UR QL STRIP: NEGATIVE
NRBC BLD AUTO-RTO: 0 /100 WBC (ref 0–0.2)
NT-PROBNP SERPL-MCNC: 1319 PG/ML (ref 0–1800)
PH UR STRIP.AUTO: 6 [PH] (ref 5–8)
PLATELET # BLD AUTO: 178 10*3/MM3 (ref 140–450)
PMV BLD AUTO: 11.1 FL (ref 6–12)
POTASSIUM SERPL-SCNC: 3.9 MMOL/L (ref 3.5–5.2)
PROCALCITONIN SERPL-MCNC: 0.03 NG/ML (ref 0–0.25)
PROT SERPL-MCNC: 6.8 G/DL (ref 6–8.5)
PROT UR QL STRIP: ABNORMAL
PROTHROMBIN TIME: 13.9 SECONDS (ref 11.7–14.2)
RBC # BLD AUTO: 4.79 10*6/MM3 (ref 3.77–5.28)
RBC # UR: ABNORMAL /HPF
REF LAB TEST METHOD: ABNORMAL
RHINOVIRUS RNA SPEC NAA+PROBE: NOT DETECTED
RSV RNA NPH QL NAA+NON-PROBE: NOT DETECTED
SARS-COV-2 RNA NPH QL NAA+NON-PROBE: NOT DETECTED
SODIUM SERPL-SCNC: 143 MMOL/L (ref 136–145)
SP GR UR STRIP: 1.03 (ref 1–1.03)
SQUAMOUS #/AREA URNS HPF: ABNORMAL /HPF
T4 FREE SERPL-MCNC: 2.59 NG/DL (ref 0.93–1.7)
TROPONIN T SERPL-MCNC: <0.01 NG/ML (ref 0–0.03)
TSH SERPL DL<=0.05 MIU/L-ACNC: <0.005 UIU/ML (ref 0.27–4.2)
UROBILINOGEN UR QL STRIP: ABNORMAL
WBC # BLD AUTO: 8.17 10*3/MM3 (ref 3.4–10.8)
WBC UR QL AUTO: ABNORMAL /HPF
WHOLE BLOOD HOLD SPECIMEN: NORMAL
WHOLE BLOOD HOLD SPECIMEN: NORMAL

## 2020-10-22 PROCEDURE — 93010 ELECTROCARDIOGRAM REPORT: CPT | Performed by: INTERNAL MEDICINE

## 2020-10-22 PROCEDURE — 83735 ASSAY OF MAGNESIUM: CPT

## 2020-10-22 PROCEDURE — 83880 ASSAY OF NATRIURETIC PEPTIDE: CPT | Performed by: NURSE PRACTITIONER

## 2020-10-22 PROCEDURE — 84439 ASSAY OF FREE THYROXINE: CPT | Performed by: EMERGENCY MEDICINE

## 2020-10-22 PROCEDURE — 84484 ASSAY OF TROPONIN QUANT: CPT

## 2020-10-22 PROCEDURE — 25010000002 ENOXAPARIN PER 10 MG: Performed by: NURSE PRACTITIONER

## 2020-10-22 PROCEDURE — 63710000001 ONDANSETRON ODT 4 MG TABLET DISPERSIBLE: Performed by: PHYSICIAN ASSISTANT

## 2020-10-22 PROCEDURE — 85610 PROTHROMBIN TIME: CPT | Performed by: EMERGENCY MEDICINE

## 2020-10-22 PROCEDURE — 99285 EMERGENCY DEPT VISIT HI MDM: CPT

## 2020-10-22 PROCEDURE — 85730 THROMBOPLASTIN TIME PARTIAL: CPT | Performed by: EMERGENCY MEDICINE

## 2020-10-22 PROCEDURE — 85025 COMPLETE CBC W/AUTO DIFF WBC: CPT

## 2020-10-22 PROCEDURE — 93005 ELECTROCARDIOGRAM TRACING: CPT | Performed by: EMERGENCY MEDICINE

## 2020-10-22 PROCEDURE — 84145 PROCALCITONIN (PCT): CPT | Performed by: EMERGENCY MEDICINE

## 2020-10-22 PROCEDURE — 93005 ELECTROCARDIOGRAM TRACING: CPT

## 2020-10-22 PROCEDURE — 71045 X-RAY EXAM CHEST 1 VIEW: CPT

## 2020-10-22 PROCEDURE — 63710000001 PREDNISONE PER 5 MG: Performed by: NURSE PRACTITIONER

## 2020-10-22 PROCEDURE — 87077 CULTURE AEROBIC IDENTIFY: CPT | Performed by: NURSE PRACTITIONER

## 2020-10-22 PROCEDURE — 25010000002 MAGNESIUM SULFATE 2 GM/50ML SOLUTION: Performed by: EMERGENCY MEDICINE

## 2020-10-22 PROCEDURE — 93306 TTE W/DOPPLER COMPLETE: CPT | Performed by: INTERNAL MEDICINE

## 2020-10-22 PROCEDURE — 25010000002 CEFTRIAXONE PER 250 MG: Performed by: EMERGENCY MEDICINE

## 2020-10-22 PROCEDURE — 25010000002 PERFLUTREN (DEFINITY) 8.476 MG IN SODIUM CHLORIDE 0.9 % 10 ML INJECTION: Performed by: NURSE PRACTITIONER

## 2020-10-22 PROCEDURE — 84443 ASSAY THYROID STIM HORMONE: CPT | Performed by: EMERGENCY MEDICINE

## 2020-10-22 PROCEDURE — 93306 TTE W/DOPPLER COMPLETE: CPT

## 2020-10-22 PROCEDURE — 80053 COMPREHEN METABOLIC PANEL: CPT

## 2020-10-22 PROCEDURE — 0202U NFCT DS 22 TRGT SARS-COV-2: CPT | Performed by: EMERGENCY MEDICINE

## 2020-10-22 PROCEDURE — 83605 ASSAY OF LACTIC ACID: CPT | Performed by: EMERGENCY MEDICINE

## 2020-10-22 PROCEDURE — 87186 SC STD MICRODIL/AGAR DIL: CPT | Performed by: NURSE PRACTITIONER

## 2020-10-22 PROCEDURE — 25010000002 ONDANSETRON PER 1 MG: Performed by: EMERGENCY MEDICINE

## 2020-10-22 PROCEDURE — 87086 URINE CULTURE/COLONY COUNT: CPT | Performed by: NURSE PRACTITIONER

## 2020-10-22 PROCEDURE — 87040 BLOOD CULTURE FOR BACTERIA: CPT | Performed by: EMERGENCY MEDICINE

## 2020-10-22 PROCEDURE — 81001 URINALYSIS AUTO W/SCOPE: CPT | Performed by: EMERGENCY MEDICINE

## 2020-10-22 RX ORDER — SODIUM CHLORIDE 0.9 % (FLUSH) 0.9 %
10 SYRINGE (ML) INJECTION AS NEEDED
Status: DISCONTINUED | OUTPATIENT
Start: 2020-10-22 | End: 2020-10-25 | Stop reason: HOSPADM

## 2020-10-22 RX ORDER — DONEPEZIL HYDROCHLORIDE 10 MG/1
10 TABLET, FILM COATED ORAL NIGHTLY
Status: DISCONTINUED | OUTPATIENT
Start: 2020-10-22 | End: 2020-10-25 | Stop reason: HOSPADM

## 2020-10-22 RX ORDER — CITALOPRAM 20 MG/1
20 TABLET ORAL DAILY
Status: DISCONTINUED | OUTPATIENT
Start: 2020-10-22 | End: 2020-10-25 | Stop reason: HOSPADM

## 2020-10-22 RX ORDER — DILTIAZEM HCL IN NACL,ISO-OSM 125 MG/125
5-15 PLASTIC BAG, INJECTION (ML) INTRAVENOUS
Status: DISCONTINUED | OUTPATIENT
Start: 2020-10-22 | End: 2020-10-22

## 2020-10-22 RX ORDER — ONDANSETRON 2 MG/ML
4 INJECTION INTRAMUSCULAR; INTRAVENOUS ONCE
Status: COMPLETED | OUTPATIENT
Start: 2020-10-22 | End: 2020-10-22

## 2020-10-22 RX ORDER — FAMOTIDINE 20 MG/1
20 TABLET, FILM COATED ORAL
Status: DISCONTINUED | OUTPATIENT
Start: 2020-10-22 | End: 2020-10-25 | Stop reason: HOSPADM

## 2020-10-22 RX ORDER — ACETAMINOPHEN 325 MG/1
650 TABLET ORAL EVERY 4 HOURS PRN
Status: DISCONTINUED | OUTPATIENT
Start: 2020-10-22 | End: 2020-10-25 | Stop reason: HOSPADM

## 2020-10-22 RX ORDER — MAGNESIUM SULFATE HEPTAHYDRATE 40 MG/ML
2 INJECTION, SOLUTION INTRAVENOUS ONCE
Status: COMPLETED | OUTPATIENT
Start: 2020-10-22 | End: 2020-10-22

## 2020-10-22 RX ORDER — ASPIRIN 81 MG/1
81 TABLET, CHEWABLE ORAL DAILY
Status: DISCONTINUED | OUTPATIENT
Start: 2020-10-22 | End: 2020-10-25 | Stop reason: HOSPADM

## 2020-10-22 RX ORDER — PREDNISONE 1 MG/1
5 TABLET ORAL DAILY
Status: DISCONTINUED | OUTPATIENT
Start: 2020-10-22 | End: 2020-10-25 | Stop reason: HOSPADM

## 2020-10-22 RX ORDER — ACETAMINOPHEN 650 MG/1
650 SUPPOSITORY RECTAL EVERY 4 HOURS PRN
Status: DISCONTINUED | OUTPATIENT
Start: 2020-10-22 | End: 2020-10-25 | Stop reason: HOSPADM

## 2020-10-22 RX ORDER — SODIUM CHLORIDE 0.9 % (FLUSH) 0.9 %
10 SYRINGE (ML) INJECTION EVERY 12 HOURS SCHEDULED
Status: DISCONTINUED | OUTPATIENT
Start: 2020-10-22 | End: 2020-10-25 | Stop reason: HOSPADM

## 2020-10-22 RX ORDER — ONDANSETRON 4 MG/1
4 TABLET, ORALLY DISINTEGRATING ORAL ONCE
Status: COMPLETED | OUTPATIENT
Start: 2020-10-22 | End: 2020-10-22

## 2020-10-22 RX ORDER — ACETAMINOPHEN 160 MG/5ML
650 SOLUTION ORAL EVERY 4 HOURS PRN
Status: DISCONTINUED | OUTPATIENT
Start: 2020-10-22 | End: 2020-10-25 | Stop reason: HOSPADM

## 2020-10-22 RX ORDER — UREA 10 %
3 LOTION (ML) TOPICAL ONCE
Status: COMPLETED | OUTPATIENT
Start: 2020-10-22 | End: 2020-10-22

## 2020-10-22 RX ORDER — CEFTRIAXONE SODIUM 1 G/50ML
1 INJECTION, SOLUTION INTRAVENOUS ONCE
Status: COMPLETED | OUTPATIENT
Start: 2020-10-22 | End: 2020-10-22

## 2020-10-22 RX ORDER — ONDANSETRON 2 MG/ML
4 INJECTION INTRAMUSCULAR; INTRAVENOUS EVERY 6 HOURS PRN
Status: DISCONTINUED | OUTPATIENT
Start: 2020-10-22 | End: 2020-10-25 | Stop reason: HOSPADM

## 2020-10-22 RX ORDER — CEFTRIAXONE SODIUM 1 G/50ML
1 INJECTION, SOLUTION INTRAVENOUS EVERY 24 HOURS
Status: DISCONTINUED | OUTPATIENT
Start: 2020-10-23 | End: 2020-10-25 | Stop reason: HOSPADM

## 2020-10-22 RX ORDER — NITROGLYCERIN 0.4 MG/1
0.4 TABLET SUBLINGUAL
Status: DISCONTINUED | OUTPATIENT
Start: 2020-10-22 | End: 2020-10-25 | Stop reason: HOSPADM

## 2020-10-22 RX ORDER — FLUDROCORTISONE ACETATE 0.1 MG/1
0.1 TABLET ORAL DAILY
Status: DISCONTINUED | OUTPATIENT
Start: 2020-10-22 | End: 2020-10-25 | Stop reason: HOSPADM

## 2020-10-22 RX ORDER — DILTIAZEM HYDROCHLORIDE 5 MG/ML
10 INJECTION INTRAVENOUS
Status: DISCONTINUED | OUTPATIENT
Start: 2020-10-22 | End: 2020-10-25 | Stop reason: HOSPADM

## 2020-10-22 RX ORDER — DILTIAZEM HYDROCHLORIDE 60 MG/1
60 TABLET, FILM COATED ORAL ONCE
Status: COMPLETED | OUTPATIENT
Start: 2020-10-22 | End: 2020-10-22

## 2020-10-22 RX ORDER — MONTELUKAST SODIUM 10 MG/1
10 TABLET ORAL NIGHTLY
Status: DISCONTINUED | OUTPATIENT
Start: 2020-10-22 | End: 2020-10-25 | Stop reason: HOSPADM

## 2020-10-22 RX ADMIN — SODIUM CHLORIDE, PRESERVATIVE FREE 10 ML: 5 INJECTION INTRAVENOUS at 13:33

## 2020-10-22 RX ADMIN — DONEPEZIL HYDROCHLORIDE 10 MG: 10 TABLET, FILM COATED ORAL at 21:18

## 2020-10-22 RX ADMIN — ONDANSETRON 4 MG: 2 INJECTION INTRAMUSCULAR; INTRAVENOUS at 07:36

## 2020-10-22 RX ADMIN — ASPIRIN 81 MG: 81 TABLET, CHEWABLE ORAL at 17:03

## 2020-10-22 RX ADMIN — SODIUM CHLORIDE, PRESERVATIVE FREE 10 ML: 5 INJECTION INTRAVENOUS at 21:18

## 2020-10-22 RX ADMIN — ONDANSETRON 4 MG: 4 TABLET, ORALLY DISINTEGRATING ORAL at 03:36

## 2020-10-22 RX ADMIN — FAMOTIDINE 20 MG: 20 TABLET, FILM COATED ORAL at 17:03

## 2020-10-22 RX ADMIN — PERFLUTREN 2 ML: 6.52 INJECTION, SUSPENSION INTRAVENOUS at 16:10

## 2020-10-22 RX ADMIN — ENOXAPARIN SODIUM 40 MG: 40 INJECTION SUBCUTANEOUS at 17:03

## 2020-10-22 RX ADMIN — DILTIAZEM HYDROCHLORIDE 10 MG: 5 INJECTION INTRAVENOUS at 08:01

## 2020-10-22 RX ADMIN — Medication 3 MG: at 22:27

## 2020-10-22 RX ADMIN — ONDANSETRON HYDROCHLORIDE 4 MG: 2 SOLUTION INTRAMUSCULAR; INTRAVENOUS at 07:05

## 2020-10-22 RX ADMIN — MAGNESIUM SULFATE HEPTAHYDRATE 2 G: 40 INJECTION, SOLUTION INTRAVENOUS at 08:00

## 2020-10-22 RX ADMIN — CEFTRIAXONE SODIUM 1 G: 1 INJECTION, SOLUTION INTRAVENOUS at 09:25

## 2020-10-22 RX ADMIN — METOPROLOL TARTRATE 12.5 MG: 25 TABLET, FILM COATED ORAL at 21:17

## 2020-10-22 RX ADMIN — CITALOPRAM 20 MG: 20 TABLET, FILM COATED ORAL at 17:03

## 2020-10-22 RX ADMIN — DILTIAZEM HYDROCHLORIDE 60 MG: 60 TABLET, FILM COATED ORAL at 10:50

## 2020-10-22 RX ADMIN — MONTELUKAST SODIUM 10 MG: 10 TABLET, FILM COATED ORAL at 21:18

## 2020-10-22 RX ADMIN — METOPROLOL TARTRATE 12.5 MG: 25 TABLET, FILM COATED ORAL at 17:03

## 2020-10-22 RX ADMIN — FLUDROCORTISONE ACETATE 0.1 MG: 0.1 TABLET ORAL at 17:03

## 2020-10-22 RX ADMIN — PREDNISONE 5 MG: 5 TABLET ORAL at 17:03

## 2020-10-22 RX ADMIN — SODIUM CHLORIDE 500 ML: 9 INJECTION, SOLUTION INTRAVENOUS at 08:01

## 2020-10-23 LAB
ANION GAP SERPL CALCULATED.3IONS-SCNC: 11.8 MMOL/L (ref 5–15)
BASOPHILS # BLD AUTO: 0.04 10*3/MM3 (ref 0–0.2)
BASOPHILS NFR BLD AUTO: 0.4 % (ref 0–1.5)
BUN SERPL-MCNC: 14 MG/DL (ref 8–23)
BUN/CREAT SERPL: 17.9 (ref 7–25)
CALCIUM SPEC-SCNC: 9.1 MG/DL (ref 8.6–10.5)
CHLORIDE SERPL-SCNC: 104 MMOL/L (ref 98–107)
CO2 SERPL-SCNC: 25.2 MMOL/L (ref 22–29)
CREAT SERPL-MCNC: 0.78 MG/DL (ref 0.57–1)
DEPRECATED RDW RBC AUTO: 42.4 FL (ref 37–54)
EOSINOPHIL # BLD AUTO: 0.07 10*3/MM3 (ref 0–0.4)
EOSINOPHIL NFR BLD AUTO: 0.8 % (ref 0.3–6.2)
ERYTHROCYTE [DISTWIDTH] IN BLOOD BY AUTOMATED COUNT: 12.7 % (ref 12.3–15.4)
GFR SERPL CREATININE-BSD FRML MDRD: 71 ML/MIN/1.73
GLUCOSE SERPL-MCNC: 80 MG/DL (ref 65–99)
HCT VFR BLD AUTO: 36.4 % (ref 34–46.6)
HGB BLD-MCNC: 11.9 G/DL (ref 12–15.9)
IMM GRANULOCYTES # BLD AUTO: 0.03 10*3/MM3 (ref 0–0.05)
IMM GRANULOCYTES NFR BLD AUTO: 0.3 % (ref 0–0.5)
LYMPHOCYTES # BLD AUTO: 1.41 10*3/MM3 (ref 0.7–3.1)
LYMPHOCYTES NFR BLD AUTO: 15.1 % (ref 19.6–45.3)
MCH RBC QN AUTO: 29.9 PG (ref 26.6–33)
MCHC RBC AUTO-ENTMCNC: 32.7 G/DL (ref 31.5–35.7)
MCV RBC AUTO: 91.5 FL (ref 79–97)
MONOCYTES # BLD AUTO: 0.74 10*3/MM3 (ref 0.1–0.9)
MONOCYTES NFR BLD AUTO: 7.9 % (ref 5–12)
NEUTROPHILS NFR BLD AUTO: 7.04 10*3/MM3 (ref 1.7–7)
NEUTROPHILS NFR BLD AUTO: 75.5 % (ref 42.7–76)
NRBC BLD AUTO-RTO: 0 /100 WBC (ref 0–0.2)
PLATELET # BLD AUTO: 154 10*3/MM3 (ref 140–450)
PMV BLD AUTO: 12 FL (ref 6–12)
POTASSIUM SERPL-SCNC: 3.4 MMOL/L (ref 3.5–5.2)
RBC # BLD AUTO: 3.98 10*6/MM3 (ref 3.77–5.28)
SODIUM SERPL-SCNC: 141 MMOL/L (ref 136–145)
WBC # BLD AUTO: 9.33 10*3/MM3 (ref 3.4–10.8)

## 2020-10-23 PROCEDURE — 80048 BASIC METABOLIC PNL TOTAL CA: CPT | Performed by: NURSE PRACTITIONER

## 2020-10-23 PROCEDURE — 99221 1ST HOSP IP/OBS SF/LOW 40: CPT | Performed by: INTERNAL MEDICINE

## 2020-10-23 PROCEDURE — 63710000001 PREDNISONE PER 5 MG: Performed by: NURSE PRACTITIONER

## 2020-10-23 PROCEDURE — 36415 COLL VENOUS BLD VENIPUNCTURE: CPT | Performed by: NURSE PRACTITIONER

## 2020-10-23 PROCEDURE — 25010000002 ENOXAPARIN PER 10 MG: Performed by: NURSE PRACTITIONER

## 2020-10-23 PROCEDURE — 93010 ELECTROCARDIOGRAM REPORT: CPT | Performed by: INTERNAL MEDICINE

## 2020-10-23 PROCEDURE — 25010000002 CEFTRIAXONE PER 250 MG: Performed by: NURSE PRACTITIONER

## 2020-10-23 PROCEDURE — 93005 ELECTROCARDIOGRAM TRACING: CPT | Performed by: NURSE PRACTITIONER

## 2020-10-23 PROCEDURE — 85025 COMPLETE CBC W/AUTO DIFF WBC: CPT | Performed by: NURSE PRACTITIONER

## 2020-10-23 RX ORDER — MAGNESIUM SULFATE HEPTAHYDRATE 40 MG/ML
2 INJECTION, SOLUTION INTRAVENOUS AS NEEDED
Status: DISCONTINUED | OUTPATIENT
Start: 2020-10-23 | End: 2020-10-25 | Stop reason: HOSPADM

## 2020-10-23 RX ORDER — POTASSIUM CHLORIDE 750 MG/1
40 TABLET, FILM COATED, EXTENDED RELEASE ORAL AS NEEDED
Status: DISCONTINUED | OUTPATIENT
Start: 2020-10-23 | End: 2020-10-25 | Stop reason: HOSPADM

## 2020-10-23 RX ORDER — OLANZAPINE 10 MG/1
2.5 INJECTION, POWDER, LYOPHILIZED, FOR SOLUTION INTRAMUSCULAR ONCE
Status: COMPLETED | OUTPATIENT
Start: 2020-10-23 | End: 2020-10-23

## 2020-10-23 RX ORDER — MAGNESIUM SULFATE HEPTAHYDRATE 40 MG/ML
4 INJECTION, SOLUTION INTRAVENOUS AS NEEDED
Status: DISCONTINUED | OUTPATIENT
Start: 2020-10-23 | End: 2020-10-25 | Stop reason: HOSPADM

## 2020-10-23 RX ORDER — POTASSIUM CHLORIDE 1.5 G/1.77G
40 POWDER, FOR SOLUTION ORAL AS NEEDED
Status: DISCONTINUED | OUTPATIENT
Start: 2020-10-23 | End: 2020-10-25 | Stop reason: HOSPADM

## 2020-10-23 RX ORDER — OLANZAPINE 10 MG/1
5 INJECTION, POWDER, LYOPHILIZED, FOR SOLUTION INTRAMUSCULAR EVERY 8 HOURS PRN
Status: DISCONTINUED | OUTPATIENT
Start: 2020-10-23 | End: 2020-10-25 | Stop reason: HOSPADM

## 2020-10-23 RX ADMIN — FAMOTIDINE 20 MG: 20 TABLET, FILM COATED ORAL at 06:46

## 2020-10-23 RX ADMIN — DONEPEZIL HYDROCHLORIDE 10 MG: 10 TABLET, FILM COATED ORAL at 20:53

## 2020-10-23 RX ADMIN — FLUDROCORTISONE ACETATE 0.1 MG: 0.1 TABLET ORAL at 09:53

## 2020-10-23 RX ADMIN — POTASSIUM CHLORIDE 40 MEQ: 1.5 POWDER, FOR SOLUTION ORAL at 09:53

## 2020-10-23 RX ADMIN — SODIUM CHLORIDE, PRESERVATIVE FREE 10 ML: 5 INJECTION INTRAVENOUS at 20:55

## 2020-10-23 RX ADMIN — ENOXAPARIN SODIUM 40 MG: 40 INJECTION SUBCUTANEOUS at 18:30

## 2020-10-23 RX ADMIN — MONTELUKAST SODIUM 10 MG: 10 TABLET, FILM COATED ORAL at 20:53

## 2020-10-23 RX ADMIN — METOPROLOL TARTRATE 12.5 MG: 25 TABLET, FILM COATED ORAL at 06:46

## 2020-10-23 RX ADMIN — CITALOPRAM 20 MG: 20 TABLET, FILM COATED ORAL at 09:53

## 2020-10-23 RX ADMIN — SODIUM CHLORIDE, PRESERVATIVE FREE 10 ML: 5 INJECTION INTRAVENOUS at 09:53

## 2020-10-23 RX ADMIN — PREDNISONE 5 MG: 5 TABLET ORAL at 09:53

## 2020-10-23 RX ADMIN — METOPROLOL TARTRATE 25 MG: 25 TABLET ORAL at 21:02

## 2020-10-23 RX ADMIN — POTASSIUM CHLORIDE 40 MEQ: 1.5 POWDER, FOR SOLUTION ORAL at 13:18

## 2020-10-23 RX ADMIN — ASPIRIN 81 MG: 81 TABLET, CHEWABLE ORAL at 09:53

## 2020-10-23 RX ADMIN — METOPROLOL TARTRATE 25 MG: 25 TABLET ORAL at 18:30

## 2020-10-23 RX ADMIN — CEFTRIAXONE SODIUM 1 G: 1 INJECTION, SOLUTION INTRAVENOUS at 16:00

## 2020-10-23 RX ADMIN — OLANZAPINE 2.5 MG: 10 INJECTION, POWDER, FOR SOLUTION INTRAMUSCULAR at 01:32

## 2020-10-23 RX ADMIN — FAMOTIDINE 20 MG: 20 TABLET, FILM COATED ORAL at 18:30

## 2020-10-24 LAB
ANION GAP SERPL CALCULATED.3IONS-SCNC: 10.3 MMOL/L (ref 5–15)
BACTERIA SPEC AEROBE CULT: ABNORMAL
BASOPHILS # BLD AUTO: 0.04 10*3/MM3 (ref 0–0.2)
BASOPHILS NFR BLD AUTO: 0.5 % (ref 0–1.5)
BUN SERPL-MCNC: 13 MG/DL (ref 8–23)
BUN/CREAT SERPL: 16.7 (ref 7–25)
CALCIUM SPEC-SCNC: 8.8 MG/DL (ref 8.6–10.5)
CHLORIDE SERPL-SCNC: 106 MMOL/L (ref 98–107)
CO2 SERPL-SCNC: 25.7 MMOL/L (ref 22–29)
CREAT SERPL-MCNC: 0.78 MG/DL (ref 0.57–1)
DEPRECATED RDW RBC AUTO: 44.8 FL (ref 37–54)
EOSINOPHIL # BLD AUTO: 0.26 10*3/MM3 (ref 0–0.4)
EOSINOPHIL NFR BLD AUTO: 3 % (ref 0.3–6.2)
ERYTHROCYTE [DISTWIDTH] IN BLOOD BY AUTOMATED COUNT: 13 % (ref 12.3–15.4)
FOLATE SERPL-MCNC: 11.5 NG/ML (ref 4.78–24.2)
GFR SERPL CREATININE-BSD FRML MDRD: 71 ML/MIN/1.73
GLUCOSE SERPL-MCNC: 91 MG/DL (ref 65–99)
HCT VFR BLD AUTO: 40.3 % (ref 34–46.6)
HGB BLD-MCNC: 12.9 G/DL (ref 12–15.9)
IMM GRANULOCYTES # BLD AUTO: 0.02 10*3/MM3 (ref 0–0.05)
IMM GRANULOCYTES NFR BLD AUTO: 0.2 % (ref 0–0.5)
LYMPHOCYTES # BLD AUTO: 1.84 10*3/MM3 (ref 0.7–3.1)
LYMPHOCYTES NFR BLD AUTO: 21.1 % (ref 19.6–45.3)
MAGNESIUM SERPL-MCNC: 2.1 MG/DL (ref 1.6–2.4)
MCH RBC QN AUTO: 29.8 PG (ref 26.6–33)
MCHC RBC AUTO-ENTMCNC: 32 G/DL (ref 31.5–35.7)
MCV RBC AUTO: 93.1 FL (ref 79–97)
MONOCYTES # BLD AUTO: 0.85 10*3/MM3 (ref 0.1–0.9)
MONOCYTES NFR BLD AUTO: 9.7 % (ref 5–12)
NEUTROPHILS NFR BLD AUTO: 5.71 10*3/MM3 (ref 1.7–7)
NEUTROPHILS NFR BLD AUTO: 65.5 % (ref 42.7–76)
NRBC BLD AUTO-RTO: 0 /100 WBC (ref 0–0.2)
PLATELET # BLD AUTO: 149 10*3/MM3 (ref 140–450)
PMV BLD AUTO: 11.3 FL (ref 6–12)
POTASSIUM SERPL-SCNC: 3.9 MMOL/L (ref 3.5–5.2)
RBC # BLD AUTO: 4.33 10*6/MM3 (ref 3.77–5.28)
SODIUM SERPL-SCNC: 142 MMOL/L (ref 136–145)
T4 FREE SERPL-MCNC: 1.98 NG/DL (ref 0.93–1.7)
TSH SERPL DL<=0.05 MIU/L-ACNC: <0.005 UIU/ML (ref 0.27–4.2)
VIT B12 BLD-MCNC: 447 PG/ML (ref 211–946)
WBC # BLD AUTO: 8.72 10*3/MM3 (ref 3.4–10.8)

## 2020-10-24 PROCEDURE — 99233 SBSQ HOSP IP/OBS HIGH 50: CPT | Performed by: INTERNAL MEDICINE

## 2020-10-24 PROCEDURE — 85025 COMPLETE CBC W/AUTO DIFF WBC: CPT | Performed by: NURSE PRACTITIONER

## 2020-10-24 PROCEDURE — 97162 PT EVAL MOD COMPLEX 30 MIN: CPT

## 2020-10-24 PROCEDURE — 25010000002 CEFTRIAXONE PER 250 MG: Performed by: NURSE PRACTITIONER

## 2020-10-24 PROCEDURE — 25010000002 ENOXAPARIN PER 10 MG: Performed by: NURSE PRACTITIONER

## 2020-10-24 PROCEDURE — 83735 ASSAY OF MAGNESIUM: CPT | Performed by: INTERNAL MEDICINE

## 2020-10-24 PROCEDURE — 63710000001 PREDNISONE PER 5 MG: Performed by: NURSE PRACTITIONER

## 2020-10-24 PROCEDURE — 36415 COLL VENOUS BLD VENIPUNCTURE: CPT | Performed by: NURSE PRACTITIONER

## 2020-10-24 PROCEDURE — 84443 ASSAY THYROID STIM HORMONE: CPT | Performed by: INTERNAL MEDICINE

## 2020-10-24 PROCEDURE — 80048 BASIC METABOLIC PNL TOTAL CA: CPT | Performed by: NURSE PRACTITIONER

## 2020-10-24 PROCEDURE — 84439 ASSAY OF FREE THYROXINE: CPT | Performed by: INTERNAL MEDICINE

## 2020-10-24 PROCEDURE — 82746 ASSAY OF FOLIC ACID SERUM: CPT | Performed by: INTERNAL MEDICINE

## 2020-10-24 PROCEDURE — 82607 VITAMIN B-12: CPT | Performed by: INTERNAL MEDICINE

## 2020-10-24 RX ORDER — AMLODIPINE BESYLATE 5 MG/1
5 TABLET ORAL
Status: DISCONTINUED | OUTPATIENT
Start: 2020-10-24 | End: 2020-10-25

## 2020-10-24 RX ADMIN — PREDNISONE 5 MG: 5 TABLET ORAL at 10:09

## 2020-10-24 RX ADMIN — AMLODIPINE BESYLATE 5 MG: 5 TABLET ORAL at 12:44

## 2020-10-24 RX ADMIN — FLUDROCORTISONE ACETATE 0.1 MG: 0.1 TABLET ORAL at 10:09

## 2020-10-24 RX ADMIN — DONEPEZIL HYDROCHLORIDE 10 MG: 10 TABLET, FILM COATED ORAL at 21:29

## 2020-10-24 RX ADMIN — ENOXAPARIN SODIUM 40 MG: 40 INJECTION SUBCUTANEOUS at 16:58

## 2020-10-24 RX ADMIN — FAMOTIDINE 20 MG: 20 TABLET, FILM COATED ORAL at 06:39

## 2020-10-24 RX ADMIN — SODIUM CHLORIDE, PRESERVATIVE FREE 10 ML: 5 INJECTION INTRAVENOUS at 21:30

## 2020-10-24 RX ADMIN — ASPIRIN 81 MG: 81 TABLET, CHEWABLE ORAL at 10:09

## 2020-10-24 RX ADMIN — SODIUM CHLORIDE, PRESERVATIVE FREE 10 ML: 5 INJECTION INTRAVENOUS at 10:09

## 2020-10-24 RX ADMIN — METOPROLOL TARTRATE 25 MG: 25 TABLET ORAL at 06:39

## 2020-10-24 RX ADMIN — ACETAMINOPHEN 650 MG: 325 TABLET, FILM COATED ORAL at 23:03

## 2020-10-24 RX ADMIN — FAMOTIDINE 20 MG: 20 TABLET, FILM COATED ORAL at 16:58

## 2020-10-24 RX ADMIN — MONTELUKAST SODIUM 10 MG: 10 TABLET, FILM COATED ORAL at 21:29

## 2020-10-24 RX ADMIN — CEFTRIAXONE SODIUM 1 G: 1 INJECTION, SOLUTION INTRAVENOUS at 16:23

## 2020-10-24 RX ADMIN — METOPROLOL SUCCINATE 75 MG: 25 TABLET, EXTENDED RELEASE ORAL at 16:23

## 2020-10-24 RX ADMIN — CITALOPRAM 20 MG: 20 TABLET, FILM COATED ORAL at 10:09

## 2020-10-24 RX ADMIN — APIXABAN 5 MG: 5 TABLET, FILM COATED ORAL at 21:29

## 2020-10-24 RX ADMIN — OLANZAPINE 5 MG: 10 INJECTION, POWDER, LYOPHILIZED, FOR SOLUTION INTRAMUSCULAR at 01:49

## 2020-10-25 ENCOUNTER — READMISSION MANAGEMENT (OUTPATIENT)
Dept: CALL CENTER | Facility: HOSPITAL | Age: 80
End: 2020-10-25

## 2020-10-25 VITALS
HEART RATE: 72 BPM | DIASTOLIC BLOOD PRESSURE: 75 MMHG | TEMPERATURE: 97.9 F | RESPIRATION RATE: 16 BRPM | SYSTOLIC BLOOD PRESSURE: 161 MMHG | BODY MASS INDEX: 28.15 KG/M2 | WEIGHT: 164.9 LBS | OXYGEN SATURATION: 7 % | HEIGHT: 64 IN

## 2020-10-25 LAB
ALBUMIN SERPL-MCNC: 3.5 G/DL (ref 3.5–5.2)
ANION GAP SERPL CALCULATED.3IONS-SCNC: 7.6 MMOL/L (ref 5–15)
BASOPHILS # BLD AUTO: 0.03 10*3/MM3 (ref 0–0.2)
BASOPHILS NFR BLD AUTO: 0.4 % (ref 0–1.5)
BUN SERPL-MCNC: 9 MG/DL (ref 8–23)
BUN/CREAT SERPL: 16.7 (ref 7–25)
CALCIUM SPEC-SCNC: 8.7 MG/DL (ref 8.6–10.5)
CHLORIDE SERPL-SCNC: 107 MMOL/L (ref 98–107)
CO2 SERPL-SCNC: 25.4 MMOL/L (ref 22–29)
CREAT SERPL-MCNC: 0.54 MG/DL (ref 0.57–1)
DEPRECATED RDW RBC AUTO: 44 FL (ref 37–54)
EOSINOPHIL # BLD AUTO: 0.35 10*3/MM3 (ref 0–0.4)
EOSINOPHIL NFR BLD AUTO: 4.6 % (ref 0.3–6.2)
ERYTHROCYTE [DISTWIDTH] IN BLOOD BY AUTOMATED COUNT: 13 % (ref 12.3–15.4)
GFR SERPL CREATININE-BSD FRML MDRD: 108 ML/MIN/1.73
GLUCOSE SERPL-MCNC: 88 MG/DL (ref 65–99)
HCT VFR BLD AUTO: 38.8 % (ref 34–46.6)
HGB BLD-MCNC: 12.7 G/DL (ref 12–15.9)
LYMPHOCYTES # BLD AUTO: 1.94 10*3/MM3 (ref 0.7–3.1)
LYMPHOCYTES NFR BLD AUTO: 25.7 % (ref 19.6–45.3)
MAGNESIUM SERPL-MCNC: 1.9 MG/DL (ref 1.6–2.4)
MCH RBC QN AUTO: 30.2 PG (ref 26.6–33)
MCHC RBC AUTO-ENTMCNC: 32.7 G/DL (ref 31.5–35.7)
MCV RBC AUTO: 92.4 FL (ref 79–97)
MONOCYTES # BLD AUTO: 0.69 10*3/MM3 (ref 0.1–0.9)
MONOCYTES NFR BLD AUTO: 9.2 % (ref 5–12)
NEUTROPHILS NFR BLD AUTO: 4.51 10*3/MM3 (ref 1.7–7)
NEUTROPHILS NFR BLD AUTO: 59.8 % (ref 42.7–76)
PHOSPHATE SERPL-MCNC: 3.6 MG/DL (ref 2.5–4.5)
PLATELET # BLD AUTO: 134 10*3/MM3 (ref 140–450)
PMV BLD AUTO: 11.6 FL (ref 6–12)
POTASSIUM SERPL-SCNC: 3.5 MMOL/L (ref 3.5–5.2)
RBC # BLD AUTO: 4.2 10*6/MM3 (ref 3.77–5.28)
SODIUM SERPL-SCNC: 140 MMOL/L (ref 136–145)
T4 FREE SERPL-MCNC: 1.86 NG/DL (ref 0.93–1.7)
TSH SERPL DL<=0.05 MIU/L-ACNC: <0.005 UIU/ML (ref 0.27–4.2)
WBC # BLD AUTO: 7.54 10*3/MM3 (ref 3.4–10.8)

## 2020-10-25 PROCEDURE — 99233 SBSQ HOSP IP/OBS HIGH 50: CPT | Performed by: INTERNAL MEDICINE

## 2020-10-25 PROCEDURE — 80069 RENAL FUNCTION PANEL: CPT | Performed by: INTERNAL MEDICINE

## 2020-10-25 PROCEDURE — 84439 ASSAY OF FREE THYROXINE: CPT | Performed by: INTERNAL MEDICINE

## 2020-10-25 PROCEDURE — 83735 ASSAY OF MAGNESIUM: CPT | Performed by: INTERNAL MEDICINE

## 2020-10-25 PROCEDURE — 63710000001 PREDNISONE PER 5 MG: Performed by: NURSE PRACTITIONER

## 2020-10-25 PROCEDURE — 85025 COMPLETE CBC W/AUTO DIFF WBC: CPT | Performed by: NURSE PRACTITIONER

## 2020-10-25 PROCEDURE — 84443 ASSAY THYROID STIM HORMONE: CPT | Performed by: INTERNAL MEDICINE

## 2020-10-25 RX ORDER — METOPROLOL SUCCINATE 25 MG/1
75 TABLET, EXTENDED RELEASE ORAL
Qty: 90 TABLET | Refills: 0 | Status: SHIPPED | OUTPATIENT
Start: 2020-10-26 | End: 2020-12-10 | Stop reason: DRUGHIGH

## 2020-10-25 RX ORDER — POTASSIUM CHLORIDE 750 MG/1
40 TABLET, FILM COATED, EXTENDED RELEASE ORAL ONCE
Status: COMPLETED | OUTPATIENT
Start: 2020-10-25 | End: 2020-10-25

## 2020-10-25 RX ORDER — AMLODIPINE BESYLATE 10 MG/1
10 TABLET ORAL
Qty: 30 TABLET | Refills: 0 | Status: SHIPPED | OUTPATIENT
Start: 2020-10-26 | End: 2020-12-10

## 2020-10-25 RX ORDER — DOXYCYCLINE HYCLATE 100 MG/1
100 TABLET, DELAYED RELEASE ORAL 2 TIMES DAILY
Qty: 10 TABLET | Refills: 0 | Status: SHIPPED | OUTPATIENT
Start: 2020-10-25 | End: 2020-10-26 | Stop reason: HOSPADM

## 2020-10-25 RX ORDER — AMLODIPINE BESYLATE 10 MG/1
10 TABLET ORAL
Status: DISCONTINUED | OUTPATIENT
Start: 2020-10-25 | End: 2020-10-25 | Stop reason: HOSPADM

## 2020-10-25 RX ADMIN — FAMOTIDINE 20 MG: 20 TABLET, FILM COATED ORAL at 07:23

## 2020-10-25 RX ADMIN — ASPIRIN 81 MG: 81 TABLET, CHEWABLE ORAL at 08:19

## 2020-10-25 RX ADMIN — PREDNISONE 5 MG: 5 TABLET ORAL at 08:19

## 2020-10-25 RX ADMIN — METOPROLOL SUCCINATE 75 MG: 25 TABLET, EXTENDED RELEASE ORAL at 08:19

## 2020-10-25 RX ADMIN — APIXABAN 5 MG: 5 TABLET, FILM COATED ORAL at 08:19

## 2020-10-25 RX ADMIN — OLANZAPINE 5 MG: 10 INJECTION, POWDER, LYOPHILIZED, FOR SOLUTION INTRAMUSCULAR at 02:50

## 2020-10-25 RX ADMIN — CITALOPRAM 20 MG: 20 TABLET, FILM COATED ORAL at 08:19

## 2020-10-25 RX ADMIN — FLUDROCORTISONE ACETATE 0.1 MG: 0.1 TABLET ORAL at 08:19

## 2020-10-25 RX ADMIN — AMLODIPINE BESYLATE 10 MG: 5 TABLET ORAL at 08:19

## 2020-10-25 RX ADMIN — POTASSIUM CHLORIDE 40 MEQ: 750 TABLET, EXTENDED RELEASE ORAL at 08:32

## 2020-10-25 RX ADMIN — SODIUM CHLORIDE, PRESERVATIVE FREE 10 ML: 5 INJECTION INTRAVENOUS at 08:20

## 2020-10-25 NOTE — OUTREACH NOTE
Prep Survey      Responses   [facility] patient discharged from?  [place]   Is LACE score < 7 ?  No   Eligibility  [facility]   Date of Admission  10/22/20   Date of Discharge  10/25/20   Discharge Disposition  Home or Self Care   Discharge diagnosis  A-fib RVR, ,  Acute UTI, early dementia   Does the patient have one of the following disease processes/diagnoses(primary or secondary)?  Other   Does the patient have Home health ordered?  No   Is there a DME ordered?  No   General alerts for this patient  [facility]   Prep survey completed?  Yes          Lisa Saldana RN

## 2020-10-26 ENCOUNTER — NURSE TRIAGE (OUTPATIENT)
Dept: CALL CENTER | Facility: HOSPITAL | Age: 80
End: 2020-10-26

## 2020-10-26 ENCOUNTER — TELEPHONE (OUTPATIENT)
Dept: INTERNAL MEDICINE | Facility: CLINIC | Age: 80
End: 2020-10-26

## 2020-10-26 ENCOUNTER — TRANSITIONAL CARE MANAGEMENT TELEPHONE ENCOUNTER (OUTPATIENT)
Dept: CALL CENTER | Facility: HOSPITAL | Age: 80
End: 2020-10-26

## 2020-10-26 DIAGNOSIS — K21.9 LARYNGOPHARYNGEAL REFLUX: ICD-10-CM

## 2020-10-26 RX ORDER — DOXYCYCLINE 100 MG/1
100 CAPSULE ORAL 2 TIMES DAILY
Qty: 10 CAPSULE | Refills: 0 | Status: SHIPPED | OUTPATIENT
Start: 2020-10-26 | End: 2020-10-31

## 2020-10-26 NOTE — OUTREACH NOTE
Case Management Call Center Follow-up      Responses   [facility] Call Center Tracking Reason?  Other (specify in comments), Medication Affordability   Other Tracking Comments  eliquis / Doxycycline   Has the Call Center Case Management Follow-up issue been resolved?  Yes   Follow-up Comments  Spoke with Ebonie at [facility] pharmacy- provided 30 day discount card/ processed $0.00 copay, she informed that the doxycycline tablet not covered, but the doxycycline monohydrate capsule is for $3.27-  MD escribed new med- spoke with Ebonie again- all meds ready for pickup and cost is near $14. for all (Norvasc, Toprol, Eliquis,Doxycycline) call to patient/spouse- informed medications ready for pickup.  Mimi SERRA,CCP          Rylie Moulton RN    10/26/2020, 16:03 CDT

## 2020-10-26 NOTE — OUTREACH NOTE
Call Center TCM Note      Responses   [facility]   Does the patient have one of the following disease processes/diagnoses(primary or secondary)?  Other   TCM attempt successful?  Yes   Call start time  1705   Call end time  1723   General alerts for this patient  [facility]   Discharge diagnosis  A-fib RVR, ,  Acute UTI, early dementia   Is patient permission given to speak with other caregiver?  Yes   Person spoke with today (if not patient) and relationship   and patient   Meds reviewed with patient/caregiver?  Yes   Is the patient having any side effects they believe may be caused by any medication additions or changes?  No   Does the patient have all medications ordered at discharge?  No   What is keeping the patient from filling the prescriptions?  Financial   Nursing Interventions  Nurse provided patient education   Prescription comments  Patient reports her  has not picked discharge meds at this time,  however, I see notes in chart that they were unable to afford Eliquis and discount card has been sent in.  reports he is going to  as soon as traffic is not so heavy.     Is the patient taking all medications as directed (includes completed medication regime)?  No   What is preventing the patient from taking all medications as directed?  Other   Nursing Interventions  Nurse provided patient education   Does the patient have a primary care provider?   Yes   Does the patient have an appointment with their PCP within 7 days of discharge?  Yes   Comments regarding PCP  Dr Dye- juan antoniot scheduled with Dr Dye in the am at 10/27/2020   Has the patient kept scheduled appointments due by today?  N/A   Has home health visited the patient within 72 hours of discharge?  N/A   Psychosocial issues?  No   Did the patient receive a copy of their discharge instructions?  Yes   Nursing interventions  Reviewed instructions with patient   What  is the patient's perception of their health status since discharge?  Improving   Is the patient/caregiver able to teach back signs and symptoms related to disease process for when to call PCP?  Yes   Is the patient/caregiver able to teach back signs and symptoms related to disease process for when to call 911?  Yes   Is the patient/caregiver able to teach back the hierarchy of who to call/visit for symptoms/problems? PCP, Specialist, Home health nurse, Urgent Care, ED, 911  Yes   TCM call completed?  Yes      Spoke with [facility] at [address] to see if medications has been picked up and to see if Eliquis was covered, or discount coupon applied. Pharmacist stated that medications are ready for pickup. There is a 0 dollar copay on Eliquis with coupon applied.     Eugene Giraldo RN    10/26/2020, 17:23 EDT

## 2020-10-26 NOTE — TELEPHONE ENCOUNTER
Pt calling back to see if she can get a card for Eliquis 5mg. She spoke with nurse triage earlier today. See notes from encounter. Please call back and advise at [phone].

## 2020-10-26 NOTE — TELEPHONE ENCOUNTER
"  Reason for Disposition  • [1] Pharmacy calling with prescription questions AND [2] triager unable to answer question    Additional Information  • Negative: Drug overdose and triager unable to answer question  • Negative: Caller requesting information unrelated to medicine  • Negative: Caller requesting a prescription for Strep throat and has a positive culture result  • Negative: Rash while taking a medication or within 3 days of stopping it  • Negative: Immunization reaction suspected  • Negative: [1] Asthma and [2] having symptoms of asthma (cough, wheezing, etc.)  • Negative: [1] Influenza symptoms AND [2] anti-viral med prescription request, such as Tamiflu  • Negative: [1] Symptom of illness (e.g., headache, abdominal pain, earache, vomiting) AND [2] more than mild  • Negative: MORE THAN A DOUBLE DOSE of a prescription or over-the-counter (OTC) drug  • Negative: [1] DOUBLE DOSE (an extra dose or lesser amount) of over-the-counter (OTC) drug AND [2] any symptoms (e.g., dizziness, nausea, pain, sleepiness)  • Negative: [1] DOUBLE DOSE (an extra dose or lesser amount) of prescription drug AND [2] any symptoms (e.g., dizziness, nausea, pain, sleepiness)  • Negative: Took another person's prescription drug  • Negative: [1] DOUBLE DOSE (an extra dose or lesser amount) of prescription drug AND [2] NO symptoms (Exception: a double dose of antibiotics)  • Negative: Diabetes drug error or overdose (e.g., took wrong type of insulin or took extra dose)  • Negative: [1] Request for URGENT new prescription or refill of \"essential\" medication (i.e., likelihood of harm to patient if not taken) AND [2] triager unable to fill per unit policy  • Negative: [1] Prescription not at pharmacy AND [2] was prescribed by PCP recently    Answer Assessment - Initial Assessment Questions  1.   NAME of MEDICATION: \"What medicine are you calling about?\"      Eliquis   2.   QUESTION: \"What is your question?\"      Cant afford   3.   " "PRESCRIBING HCP: \"Who prescribed it?\" Reason: if prescribed by specialist, call should be referred to that group.      Cardiologist.   4. SYMPTOMS: \"Do you have any symptoms?\"     n  5. SEVERITY: If symptoms are present, ask \"Are they mild, moderate or severe?\"      n   6.  PREGNANCY:  \"Is there any chance that you are pregnant?\" \"When was your last menstrual period?\"      n    Protocols used: MEDICATION QUESTION CALL-ADULT-    "

## 2020-10-26 NOTE — TELEPHONE ENCOUNTER
She was discharged on 10/25/2020- form  [facility]- she was prescribed Eliquis 5mg, they cannot afford it, and they want to go to Coumadin. Explained that it would be the providers decision what blood thinner medication would be best for her case. Her  is on Coumadin and he wants to share his medication with his wife. Advised to not give or take a medication that is not prescribed to the person. Advised do not take your husbands medication.  A phone call and a message will be sent to CM. Advised to call PCP and Cardiologist.

## 2020-10-27 ENCOUNTER — TELEPHONE (OUTPATIENT)
Dept: INTERNAL MEDICINE | Facility: CLINIC | Age: 80
End: 2020-10-27

## 2020-10-27 ENCOUNTER — OFFICE VISIT (OUTPATIENT)
Dept: INTERNAL MEDICINE | Facility: CLINIC | Age: 80
End: 2020-10-27

## 2020-10-27 VITALS
TEMPERATURE: 97.1 F | HEIGHT: 64 IN | BODY MASS INDEX: 27.66 KG/M2 | HEART RATE: 84 BPM | SYSTOLIC BLOOD PRESSURE: 122 MMHG | DIASTOLIC BLOOD PRESSURE: 66 MMHG | WEIGHT: 162 LBS

## 2020-10-27 DIAGNOSIS — I48.91 ATRIAL FIBRILLATION WITH RVR (HCC): ICD-10-CM

## 2020-10-27 DIAGNOSIS — N39.0 ACUTE UTI (URINARY TRACT INFECTION): Primary | ICD-10-CM

## 2020-10-27 DIAGNOSIS — E05.80 IATROGENIC HYPERTHYROIDISM: ICD-10-CM

## 2020-10-27 LAB
BACTERIA SPEC AEROBE CULT: NORMAL
BACTERIA SPEC AEROBE CULT: NORMAL
BILIRUB BLD-MCNC: NEGATIVE MG/DL
CLARITY, POC: ABNORMAL
COLOR UR: YELLOW
GLUCOSE UR STRIP-MCNC: NEGATIVE MG/DL
KETONES UR QL: ABNORMAL
LEUKOCYTE EST, POC: ABNORMAL
NITRITE UR-MCNC: NEGATIVE MG/ML
PH UR: 6 [PH] (ref 5–8)
PROT UR STRIP-MCNC: ABNORMAL MG/DL
QT INTERVAL: 384 MS
RBC # UR STRIP: ABNORMAL /UL
SP GR UR: 1.02 (ref 1–1.03)
UROBILINOGEN UR QL: NORMAL

## 2020-10-27 PROCEDURE — 81003 URINALYSIS AUTO W/O SCOPE: CPT | Performed by: INTERNAL MEDICINE

## 2020-10-27 PROCEDURE — 99214 OFFICE O/P EST MOD 30 MIN: CPT | Performed by: INTERNAL MEDICINE

## 2020-10-27 RX ORDER — PANTOPRAZOLE SODIUM 40 MG/1
TABLET, DELAYED RELEASE ORAL
Qty: 90 TABLET | Refills: 0 | Status: SHIPPED | OUTPATIENT
Start: 2020-10-27 | End: 2020-12-10

## 2020-10-27 NOTE — TELEPHONE ENCOUNTER
Please call her.  Tell her I think the heart issue is related to taking too much thyroid medication- not sure if she took extra doses or if the dose itself was wrong.  She is holding it and we can recheck on Monday.  She is going to see cardiology as well so that we can, hopefully, get her off some of the meds they started in the hospital for the atrial fib.

## 2020-10-27 NOTE — PROGRESS NOTES
Assessment and Plan  Diagnoses and all orders for this visit:    1. Acute UTI (urinary tract infection) (Primary)  Comments:  urine today is not clear- unsure if related to recent UTI- as on abx and no symptoms, will follow.   Orders:  -     POCT urinalysis dipstick, automated    2. Atrial fibrillation with RVR (CMS/[facility])  Comments:  hopefully related to iatrogenic hyperthyroid- will remain on meds and see Dr. Lizama in f/u-   Orders:  -     Ambulatory Referral to Cardiology    3. Iatrogenic hyperthyroidism  Comments:  unsure if she's taken the wrong dose, too many doses, etc.  She will continue to hold and recheck TSH on 11/2      F/U and Patient Instructions    Return in about 3 months (around 1/27/2021).  There are no Patient Instructions on file for this visit.    Connor Wilson is a 80 y.o. female being seen in our office today for Fatigue (Hospital follow up ) and Dizziness     History of the Present Illness  HPI  Went to hospital last week with elevated HR- found to be in a fib with RVR and suppressed TSH- unsure if she has been taking incorrectly.  She feels weaker since she's been home.  Now on Eliquis and Toprol XL.  She has been on levothyroxine for several years per Dr. Curiel.  She has not taken any since hospitalization.  She also had a UTI which was treated.   Consulted psychiatry put her on Zyprexa at bedtime (she did not come home with this)  She noticed a little bit of bright red blood when she urinated this am- has no pain/tenderness or dysuria.      Patient History        Significant Past History  The following portions of the patient's history were reviewed and updated as appropriate:PMHroutine: Social history , Past Medical History, Allergies, Current Medications, Active Problem List and Health Maintenance              Social History  She  reports that she has never smoked. She has never used smokeless tobacco. She reports previous alcohol use. She reports that she does  not use drugs.                         Review of Symptoms  Review of Systems   Constitution: Negative for decreased appetite and weight loss.   HENT: Negative.    Cardiovascular: Positive for irregular heartbeat. Negative for chest pain and near-syncope.   Respiratory: Negative for shortness of breath.    Endocrine: Negative for cold intolerance and heat intolerance.   Musculoskeletal: Negative.    Genitourinary: Positive for hematuria. Negative for bladder incontinence and frequency.   Neurological: Negative for light-headedness and weakness.     Objective  Vital Signs         BP Readings from Last 1 Encounters:   10/27/20 122/66     Wt Readings from Last 3 Encounters:   10/27/20 73.5 kg (162 lb)   10/25/20 74.8 kg (164 lb 14.5 oz)   07/21/20 70.8 kg (156 lb)   Body mass index is 27.79 kg/m².          Physical Exam   Physical Exam  Constitutional:       Appearance: Normal appearance.   Cardiovascular:      Rate and Rhythm: Normal rate. Rhythm irregular.   Pulmonary:      Effort: Pulmonary effort is normal.      Breath sounds: Normal breath sounds.   Musculoskeletal:      Right lower leg: No edema.      Left lower leg: No edema.       Data Reviewed    Recent Results (from the past 2016 hour(s))   Comprehensive Metabolic Panel    Collection Time: 10/22/20  2:56 AM    Specimen: Blood   Result Value Ref Range    Glucose 126 (H) 65 - 99 mg/dL    BUN 8 8 - 23 mg/dL    Creatinine 0.70 0.57 - 1.00 mg/dL    Sodium 143 136 - 145 mmol/L    Potassium 3.9 3.5 - 5.2 mmol/L    Chloride 106 98 - 107 mmol/L    CO2 27.7 22.0 - 29.0 mmol/L    Calcium 9.9 8.6 - 10.5 mg/dL    Total Protein 6.8 6.0 - 8.5 g/dL    Albumin 4.50 3.50 - 5.20 g/dL    ALT (SGPT) 19 1 - 33 U/L    AST (SGOT) 22 1 - 32 U/L    Alkaline Phosphatase 113 39 - 117 U/L    Total Bilirubin 2.2 (H) 0.0 - 1.2 mg/dL    eGFR Non African Amer 80 >60 mL/min/1.73    Globulin 2.3 gm/dL    A/G Ratio 2.0 g/dL    BUN/Creatinine Ratio 11.4 7.0 - 25.0    Anion Gap 9.3 5.0 - 15.0  mmol/L   Troponin    Collection Time: 10/22/20  2:56 AM    Specimen: Blood   Result Value Ref Range    Troponin T <0.010 0.000 - 0.030 ng/mL   Magnesium    Collection Time: 10/22/20  2:56 AM    Specimen: Blood   Result Value Ref Range    Magnesium 2.0 1.6 - 2.4 mg/dL   Light Blue Top    Collection Time: 10/22/20  2:56 AM   Result Value Ref Range    Extra Tube hold for add-on    Green Top (Gel)    Collection Time: 10/22/20  2:56 AM   Result Value Ref Range    Extra Tube Hold for add-ons.    Lavender Top    Collection Time: 10/22/20  2:56 AM   Result Value Ref Range    Extra Tube hold for add-on    Gold Top - SST    Collection Time: 10/22/20  2:56 AM   Result Value Ref Range    Extra Tube Hold for add-ons.    CBC Auto Differential    Collection Time: 10/22/20  2:56 AM    Specimen: Blood   Result Value Ref Range    WBC 8.17 3.40 - 10.80 10*3/mm3    RBC 4.79 3.77 - 5.28 10*6/mm3    Hemoglobin 14.1 12.0 - 15.9 g/dL    Hematocrit 42.8 34.0 - 46.6 %    MCV 89.4 79.0 - 97.0 fL    MCH 29.4 26.6 - 33.0 pg    MCHC 32.9 31.5 - 35.7 g/dL    RDW 12.8 12.3 - 15.4 %    RDW-SD 41.4 37.0 - 54.0 fl    MPV 11.1 6.0 - 12.0 fL    Platelets 178 140 - 450 10*3/mm3    Neutrophil % 83.0 (H) 42.7 - 76.0 %    Lymphocyte % 10.0 (L) 19.6 - 45.3 %    Monocyte % 6.0 5.0 - 12.0 %    Eosinophil % 0.1 (L) 0.3 - 6.2 %    Basophil % 0.4 0.0 - 1.5 %    Immature Grans % 0.5 0.0 - 0.5 %    Neutrophils, Absolute 6.78 1.70 - 7.00 10*3/mm3    Lymphocytes, Absolute 0.82 0.70 - 3.10 10*3/mm3    Monocytes, Absolute 0.49 0.10 - 0.90 10*3/mm3    Eosinophils, Absolute 0.01 0.00 - 0.40 10*3/mm3    Basophils, Absolute 0.03 0.00 - 0.20 10*3/mm3    Immature Grans, Absolute 0.04 0.00 - 0.05 10*3/mm3    nRBC 0.0 0.0 - 0.2 /100 WBC   Urinalysis With Microscopic If Indicated (No Culture) - Urine, Clean Catch    Collection Time: 10/22/20  6:45 AM    Specimen: Urine, Clean Catch   Result Value Ref Range    Color, UA Dark Yellow (A) Yellow, Straw    Appearance, UA Cloudy (A)  Clear    pH, UA 6.0 5.0 - 8.0    Specific Gravity, UA 1.026 1.005 - 1.030    Glucose, UA Negative Negative    Ketones, UA 15 mg/dL (1+) (A) Negative    Bilirubin, UA Negative Negative    Blood, UA Large (3+) (A) Negative    Protein, UA 30 mg/dL (1+) (A) Negative    Leuk Esterase, UA Small (1+) (A) Negative    Nitrite, UA Negative Negative    Urobilinogen, UA 1.0 E.U./dL 0.2 - 1.0 E.U./dL   Urinalysis, Microscopic Only - Urine, Clean Catch    Collection Time: 10/22/20  6:45 AM    Specimen: Urine, Clean Catch   Result Value Ref Range    RBC, UA 3-5 (A) None Seen, 0-2 /HPF    WBC, UA 13-20 (A) None Seen, 0-2 /HPF    Bacteria, UA 4+ (A) None Seen /HPF    Squamous Epithelial Cells, UA 3-6 (A) None Seen, 0-2 /HPF    Hyaline Casts, UA None Seen None Seen /LPF    Methodology Manual Light Microscopy    Urine Culture - Urine, Urine, Clean Catch    Collection Time: 10/22/20  6:45 AM    Specimen: Urine, Clean Catch   Result Value Ref Range    Urine Culture >100,000 CFU/mL Escherichia coli (A)        Susceptibility    Escherichia coli - CHRISTINA     Ampicillin 8 Susceptible ug/ml     Ampicillin + Sulbactam <=2 Susceptible ug/ml     Cefazolin <=4 Susceptible ug/ml     Cefepime <=1 Susceptible ug/ml     Ceftazidime <=1 Susceptible ug/ml     Ceftriaxone <=1 Susceptible ug/ml     Gentamicin <=1 Susceptible ug/ml     Levofloxacin <=0.12 Susceptible ug/ml     Nitrofurantoin <=16 Susceptible ug/ml     Piperacillin + Tazobactam <=4 Susceptible ug/ml     Tetracycline <=1 Susceptible ug/ml     Trimethoprim + Sulfamethoxazole <=20 Susceptible ug/ml   Protime-INR    Collection Time: 10/22/20  8:15 AM    Specimen: Blood   Result Value Ref Range    Protime 13.9 11.7 - 14.2 Seconds    INR 1.08 0.90 - 1.10   aPTT    Collection Time: 10/22/20  8:15 AM    Specimen: Blood   Result Value Ref Range    PTT 27.6 22.7 - 35.4 seconds   TSH    Collection Time: 10/22/20  8:15 AM    Specimen: Blood   Result Value Ref Range    TSH <0.005 (L) 0.270 - 4.200 uIU/mL    T4, Free    Collection Time: 10/22/20  8:15 AM    Specimen: Blood   Result Value Ref Range    Free T4 2.59 (H) 0.93 - 1.70 ng/dL   Respiratory Panel PCR w/COVID-19(SARS-CoV-2) ED/JOSEFINA/HARINI/PAD/COR/MAD/TRACEY In-House, NP Swab in UTM/VTM, 3-4 HR TAT - Swab, Nasopharynx    Collection Time: 10/22/20  8:15 AM    Specimen: Nasopharynx; Swab   Result Value Ref Range    ADENOVIRUS, PCR Not Detected Not Detected    Coronavirus 229E Not Detected Not Detected    Coronavirus HKU1 Not Detected Not Detected    Coronavirus NL63 Not Detected Not Detected    Coronavirus OC43 Not Detected Not Detected    COVID19 Not Detected Not Detected - Ref. Range    Human Metapneumovirus Not Detected Not Detected    Human Rhinovirus/Enterovirus Not Detected Not Detected    Influenza A PCR Not Detected Not Detected    Influenza B PCR Not Detected Not Detected    Parainfluenza Virus 1 Not Detected Not Detected    Parainfluenza Virus 2 Not Detected Not Detected    Parainfluenza Virus 3 Not Detected Not Detected    Parainfluenza Virus 4 Not Detected Not Detected    RSV, PCR Not Detected Not Detected    Bordetella pertussis pcr Not Detected Not Detected    Bordetella parapertussis PCR Not Detected Not Detected    Chlamydophila pneumoniae PCR Not Detected Not Detected    Mycoplasma pneumo by PCR Not Detected Not Detected   Lactic Acid, Plasma    Collection Time: 10/22/20  8:15 AM    Specimen: Blood   Result Value Ref Range    Lactate 1.7 0.5 - 2.0 mmol/L   Procalcitonin    Collection Time: 10/22/20  8:15 AM    Specimen: Blood   Result Value Ref Range    Procalcitonin 0.03 0.00 - 0.25 ng/mL   Blood Culture - Blood, Arm, Right    Collection Time: 10/22/20  8:15 AM    Specimen: Arm, Right; Blood   Result Value Ref Range    Blood Culture No growth at 5 days    BNP    Collection Time: 10/22/20  8:15 AM    Specimen: Blood   Result Value Ref Range    proBNP 1,319.0 0.0-1,800.0 pg/mL   Adult Transthoracic Echo Complete W/ Cont if Necessary Per Protocol     Collection Time: 10/22/20  4:22 PM   Result Value Ref Range    BSA 1.8 m^2    IVSd 1.0 cm    LVIDd 4.4 cm    LVIDs 2.8 cm    LVPWd 1.1 cm    IVS/LVPW 0.91     FS 36.4 %    EDV(Teich) 87.7 ml    ESV(Teich) 29.6 ml    EF(Teich) 66.3 %    EDV(cubed) 85.2 ml    ESV(cubed) 22.0 ml    EF(cubed) 74.2 %    LV mass(C)d 158.2 grams    LV mass(C)dI 88.7 grams/m^2    SV(Teich) 58.1 ml    SI(Teich) 32.6 ml/m^2    SV(cubed) 63.2 ml    SI(cubed) 35.4 ml/m^2    Ao root diam 2.4 cm    Ao root area 4.5 cm^2    ACS 1.7 cm    asc Aorta Diam 2.7 cm    LVOT diam 2.0 cm    LVOT area 3.1 cm^2    LVOT area(traced) 3.1 cm^2    RVOT diam 1.9 cm    RVOT area 2.8 cm^2    LVLd ap4 6.1 cm    EDV(MOD-sp4) 87.0 ml    LVLs ap4 5.4 cm    ESV(MOD-sp4) 45.0 ml    EF(MOD-sp4) 48.3 %    LVLd ap2 6.4 cm    EDV(MOD-sp2) 70.0 ml    LVLs ap2 5.2 cm    ESV(MOD-sp2) 36.0 ml    EF(MOD-sp2) 48.6 %    SV(MOD-sp4) 42.0 ml    SI(MOD-sp4) 23.5 ml/m^2    SV(MOD-sp2) 34.0 ml    SI(MOD-sp2) 19.1 ml/m^2    Ao root area (BSA corrected) 1.3     LV [place] Vol (BSA corrected) 48.8 ml/m^2    LV Sys Vol (BSA corrected) 25.2 ml/m^2    TAPSE (>1.6) 1.3 cm    EF(MOD-bp) 49.2 %    MV E max hammad 223.0 cm/sec    MV V2 max 270.0 cm/sec    MV max PG 29.2 mmHg    MV V2 mean 173.0 cm/sec    MV mean PG 14.0 mmHg    MV V2 VTI 51.0 cm    MVA(VTI) 1.3 cm^2    MV P1/2t max hammad 273.0 cm/sec    MV P1/2t 70.1 msec    MVA(P1/2t) 3.1 cm^2    MV dec slope 1,141 cm/sec^2    MV dec time 250 sec    Ao pk hammad 195.0 cm/sec    Ao max PG 15.2 mmHg    Ao max PG (full) 8.9 mmHg    Ao V2 mean 114.0 cm/sec    Ao mean PG 6.0 mmHg    Ao mean PG (full) 3.0 mmHg    Ao V2 VTI 32.1 cm    OLLIE(I,A) 2.1 cm^2    OLLIE(I,D) 2.1 cm^2    OLLIE(V,A) 2.0 cm^2    OLLIE(V,D) 2.0 cm^2    LV V1 max PG 6.4 mmHg    LV V1 mean PG 3.0 mmHg    LV V1 max 126.0 cm/sec    LV V1 mean 80.2 cm/sec    LV V1 VTI 21.9 cm    SV(Ao) 145.2 ml    SI(Ao) 81.4 ml/m^2    SV(LVOT) 68.8 ml    SV(RVOT) 39.4 ml    SI(LVOT) 38.6 ml/m^2    PA V2 max 118.0  cm/sec    PA max PG 5.6 mmHg    PA max PG (full) 2.1 mmHg     CV ECHO RICHA - PVA(V,A) 2.2 cm^2     CV ECHO RICHA - PVA(V,D) 2.2 cm^2    PA acc time 0.05 sec    PI end-d panda 85.5 cm/sec    RV V1 max PG 3.4 mmHg    RV V1 mean PG 1.0 mmHg    RV V1 max 92.7 cm/sec    RV V1 mean 52.2 cm/sec    RV V1 VTI 13.9 cm    TR max panda 239.0 cm/sec    RVSP(TR) 25.8 mmHg    RAP systole 3.0 mmHg    PA pr(Accel) 55.2 mmHg    Pulm Sys Panda 40.9 cm/sec    Pulm Ratliff Panda 62.8 cm/sec    Pulm S/D 0.65     Qp/Qs 0.57     MVA P1/2T LCG 0.81 cm^2    RV Base 3.3 cm    RV Length 6.3 cm    RV Mid 2.2 cm    RV S' 8.2 cm/sec     CV ECHO RICHA - BZI_BMI 27.6 kilograms/m^2     CV ECHO RICHA - BSA([place]) 1.8 m^2     CV ECHO RICHA - BZI_METRIC_WEIGHT 73.0 kg     CV ECHO RICHA - BZI_METRIC_HEIGHT 162.6 cm     CV VAS BP RIGHT /103 mmHg    Dimensionless Index 0.70 (DI)    LA Volume Index 34.0 mL/m2   Basic Metabolic Panel    Collection Time: 10/23/20  5:00 AM    Specimen: Blood   Result Value Ref Range    Glucose 80 65 - 99 mg/dL    BUN 14 8 - 23 mg/dL    Creatinine 0.78 0.57 - 1.00 mg/dL    Sodium 141 136 - 145 mmol/L    Potassium 3.4 (L) 3.5 - 5.2 mmol/L    Chloride 104 98 - 107 mmol/L    CO2 25.2 22.0 - 29.0 mmol/L    Calcium 9.1 8.6 - 10.5 mg/dL    eGFR Non African Amer 71 >60 mL/min/1.73    BUN/Creatinine Ratio 17.9 7.0 - 25.0    Anion Gap 11.8 5.0 - 15.0 mmol/L   CBC Auto Differential    Collection Time: 10/23/20  5:00 AM    Specimen: Blood   Result Value Ref Range    WBC 9.33 3.40 - 10.80 10*3/mm3    RBC 3.98 3.77 - 5.28 10*6/mm3    Hemoglobin 11.9 (L) 12.0 - 15.9 g/dL    Hematocrit 36.4 34.0 - 46.6 %    MCV 91.5 79.0 - 97.0 fL    MCH 29.9 26.6 - 33.0 pg    MCHC 32.7 31.5 - 35.7 g/dL    RDW 12.7 12.3 - 15.4 %    RDW-SD 42.4 37.0 - 54.0 fl    MPV 12.0 6.0 - 12.0 fL    Platelets 154 140 - 450 10*3/mm3    Neutrophil % 75.5 42.7 - 76.0 %    Lymphocyte % 15.1 (L) 19.6 - 45.3 %    Monocyte % 7.9 5.0 - 12.0 %    Eosinophil % 0.8 0.3 - 6.2  %    Basophil % 0.4 0.0 - 1.5 %    Immature Grans % 0.3 0.0 - 0.5 %    Neutrophils, Absolute 7.04 (H) 1.70 - 7.00 10*3/mm3    Lymphocytes, Absolute 1.41 0.70 - 3.10 10*3/mm3    Monocytes, Absolute 0.74 0.10 - 0.90 10*3/mm3    Eosinophils, Absolute 0.07 0.00 - 0.40 10*3/mm3    Basophils, Absolute 0.04 0.00 - 0.20 10*3/mm3    Immature Grans, Absolute 0.03 0.00 - 0.05 10*3/mm3    nRBC 0.0 0.0 - 0.2 /100 WBC   Basic Metabolic Panel    Collection Time: 10/24/20  5:18 AM    Specimen: Blood   Result Value Ref Range    Glucose 91 65 - 99 mg/dL    BUN 13 8 - 23 mg/dL    Creatinine 0.78 0.57 - 1.00 mg/dL    Sodium 142 136 - 145 mmol/L    Potassium 3.9 3.5 - 5.2 mmol/L    Chloride 106 98 - 107 mmol/L    CO2 25.7 22.0 - 29.0 mmol/L    Calcium 8.8 8.6 - 10.5 mg/dL    eGFR Non African Amer 71 >60 mL/min/1.73    BUN/Creatinine Ratio 16.7 7.0 - 25.0    Anion Gap 10.3 5.0 - 15.0 mmol/L   Vitamin B12    Collection Time: 10/24/20  5:18 AM    Specimen: Blood   Result Value Ref Range    Vitamin B-12 447 211 - 946 pg/mL   Folate    Collection Time: 10/24/20  5:18 AM    Specimen: Blood   Result Value Ref Range    Folate 11.50 4.78 - 24.20 ng/mL   TSH    Collection Time: 10/24/20  5:18 AM    Specimen: Blood   Result Value Ref Range    TSH <0.005 (L) 0.270 - 4.200 uIU/mL   T4, Free    Collection Time: 10/24/20  5:18 AM    Specimen: Blood   Result Value Ref Range    Free T4 1.98 (H) 0.93 - 1.70 ng/dL   Magnesium    Collection Time: 10/24/20  5:18 AM    Specimen: Blood   Result Value Ref Range    Magnesium 2.1 1.6 - 2.4 mg/dL   CBC Auto Differential    Collection Time: 10/24/20  5:18 AM    Specimen: Blood   Result Value Ref Range    WBC 8.72 3.40 - 10.80 10*3/mm3    RBC 4.33 3.77 - 5.28 10*6/mm3    Hemoglobin 12.9 12.0 - 15.9 g/dL    Hematocrit 40.3 34.0 - 46.6 %    MCV 93.1 79.0 - 97.0 fL    MCH 29.8 26.6 - 33.0 pg    MCHC 32.0 31.5 - 35.7 g/dL    RDW 13.0 12.3 - 15.4 %    RDW-SD 44.8 37.0 - 54.0 fl    MPV 11.3 6.0 - 12.0 fL    Platelets 149  140 - 450 10*3/mm3    Neutrophil % 65.5 42.7 - 76.0 %    Lymphocyte % 21.1 19.6 - 45.3 %    Monocyte % 9.7 5.0 - 12.0 %    Eosinophil % 3.0 0.3 - 6.2 %    Basophil % 0.5 0.0 - 1.5 %    Immature Grans % 0.2 0.0 - 0.5 %    Neutrophils, Absolute 5.71 1.70 - 7.00 10*3/mm3    Lymphocytes, Absolute 1.84 0.70 - 3.10 10*3/mm3    Monocytes, Absolute 0.85 0.10 - 0.90 10*3/mm3    Eosinophils, Absolute 0.26 0.00 - 0.40 10*3/mm3    Basophils, Absolute 0.04 0.00 - 0.20 10*3/mm3    Immature Grans, Absolute 0.02 0.00 - 0.05 10*3/mm3    nRBC 0.0 0.0 - 0.2 /100 WBC   Renal Function Panel    Collection Time: 10/25/20  6:03 AM    Specimen: Blood   Result Value Ref Range    Glucose 88 65 - 99 mg/dL    BUN 9 8 - 23 mg/dL    Creatinine 0.54 (L) 0.57 - 1.00 mg/dL    Sodium 140 136 - 145 mmol/L    Potassium 3.5 3.5 - 5.2 mmol/L    Chloride 107 98 - 107 mmol/L    CO2 25.4 22.0 - 29.0 mmol/L    Calcium 8.7 8.6 - 10.5 mg/dL    Albumin 3.50 3.50 - 5.20 g/dL    Phosphorus 3.6 2.5 - 4.5 mg/dL    Anion Gap 7.6 5.0 - 15.0 mmol/L    BUN/Creatinine Ratio 16.7 7.0 - 25.0    eGFR Non African Amer 108 >60 mL/min/1.73   Magnesium    Collection Time: 10/25/20  6:03 AM    Specimen: Blood   Result Value Ref Range    Magnesium 1.9 1.6 - 2.4 mg/dL   TSH    Collection Time: 10/25/20  6:03 AM    Specimen: Blood   Result Value Ref Range    TSH <0.005 (L) 0.270 - 4.200 uIU/mL   T4, Free    Collection Time: 10/25/20  6:03 AM    Specimen: Blood   Result Value Ref Range    Free T4 1.86 (H) 0.93 - 1.70 ng/dL   CBC Auto Differential    Collection Time: 10/25/20  6:03 AM    Specimen: Blood   Result Value Ref Range    WBC 7.54 3.40 - 10.80 10*3/mm3    RBC 4.20 3.77 - 5.28 10*6/mm3    Hemoglobin 12.7 12.0 - 15.9 g/dL    Hematocrit 38.8 34.0 - 46.6 %    MCV 92.4 79.0 - 97.0 fL    MCH 30.2 26.6 - 33.0 pg    MCHC 32.7 31.5 - 35.7 g/dL    RDW 13.0 12.3 - 15.4 %    RDW-SD 44.0 37.0 - 54.0 fl    MPV 11.6 6.0 - 12.0 fL    Platelets 134 (L) 140 - 450 10*3/mm3    Neutrophil %  59.8 42.7 - 76.0 %    Lymphocyte % 25.7 19.6 - 45.3 %    Monocyte % 9.2 5.0 - 12.0 %    Eosinophil % 4.6 0.3 - 6.2 %    Basophil % 0.4 0.0 - 1.5 %    Neutrophils, Absolute 4.51 1.70 - 7.00 10*3/mm3    Lymphocytes, Absolute 1.94 0.70 - 3.10 10*3/mm3    Monocytes, Absolute 0.69 0.10 - 0.90 10*3/mm3    Eosinophils, Absolute 0.35 0.00 - 0.40 10*3/mm3    Basophils, Absolute 0.03 0.00 - 0.20 10*3/mm3   POCT urinalysis dipstick, automated    Collection Time: 10/27/20  9:11 AM    Specimen: Urine   Result Value Ref Range    Color Yellow Yellow, Straw, Dark Yellow, Elise    Clarity, UA Cloudy (A) Clear    Specific Gravity  1.025 1.005 - 1.030    pH, Urine 6.0 5.0 - 8.0    Leukocytes Moderate (2+) (A) Negative    Nitrite, UA Negative Negative    Protein, POC Trace (A) Negative mg/dL    Glucose, UA Negative Negative, 1000 mg/dL (3+) mg/dL    Ketones, UA 1+ (A) Negative    Urobilinogen, UA Normal Normal    Bilirubin Negative Negative    Blood, UA Trace (A) Negative

## 2020-10-27 NOTE — TELEPHONE ENCOUNTER
PATIENT LYNNETTE BONNER IS WANTING TO HAVE A CALL BACK FROM DR. NAVARRO    PATIENT WAS UNABLE TO COME WITH THE PATIENT TODAY FOR HER VISIT  AND SHE IS WANTING TO KNOW HOW EVERYTHING WENT    PATIENT DAUGHTER HA CANTU IS ON HER BH VERBAL      PLEASE CONTACT LYNNETTE CANTU @ [phone]

## 2020-10-29 ENCOUNTER — TELEPHONE (OUTPATIENT)
Dept: INTERNAL MEDICINE | Facility: CLINIC | Age: 80
End: 2020-10-29

## 2020-10-29 LAB
BACTERIA UR CULT: ABNORMAL
BACTERIA UR CULT: ABNORMAL
OTHER ANTIBIOTIC SUSC ISLT: ABNORMAL

## 2020-10-29 RX ORDER — NITROFURANTOIN 25; 75 MG/1; MG/1
100 CAPSULE ORAL 2 TIMES DAILY
Qty: 14 CAPSULE | Refills: 0 | Status: SHIPPED | OUTPATIENT
Start: 2020-10-29 | End: 2020-12-10

## 2020-10-29 NOTE — TELEPHONE ENCOUNTER
PATIENT STATES:that she needs some meds called in to treat her Urinary Tract Infection and would like a phone call when the med is sent in please advise     PATIENT CAN BE REACHED ON:[phone]    PHARMACY PREFERRED:[id number] [place], [place] - [address] AT [facility] [address] [facility] [phone] [facility] [phone] FX

## 2020-11-02 DIAGNOSIS — E05.90 HYPERTHYROIDISM: ICD-10-CM

## 2020-11-02 DIAGNOSIS — E05.90 HYPERTHYROIDISM: Primary | ICD-10-CM

## 2020-11-03 DIAGNOSIS — E05.90 HYPERTHYROIDISM: Primary | ICD-10-CM

## 2020-11-03 LAB
T4 FREE SERPL-MCNC: 1.48 NG/DL (ref 0.93–1.7)
TSH SERPL DL<=0.005 MIU/L-ACNC: 0.01 UIU/ML (ref 0.27–4.2)

## 2020-11-04 ENCOUNTER — READMISSION MANAGEMENT (OUTPATIENT)
Dept: CALL CENTER | Facility: HOSPITAL | Age: 80
End: 2020-11-04

## 2020-11-04 NOTE — OUTREACH NOTE
Medical Week 2 Survey      Responses   [facility] patient discharged from?  [place]   Does the patient have one of the following disease processes/diagnoses(primary or secondary)?  Other   Week 2 attempt successful?  Yes   Call start time  1217   Call end time  1226   Meds reviewed with patient/caregiver?  Yes   Prescription comments  Pt is now taking her Eliquis.   Is the patient taking all medications as directed (includes completed medication regime)?  Yes   Comments regarding appointments  Pt to see cardiology on November 16, 2020.   What is the patient's perception of their health status since discharge?  Improving   Additional teach back comments  Pt very lucid and is able to state her upcoming appointments. She reports she is doing very well.   Week 2 Call Completed?  Yes          Pia Diamond RN

## 2020-11-13 ENCOUNTER — READMISSION MANAGEMENT (OUTPATIENT)
Dept: CALL CENTER | Facility: HOSPITAL | Age: 80
End: 2020-11-13

## 2020-11-13 NOTE — OUTREACH NOTE
Medical Week 3 Survey      Responses   [facility]   Does the patient have one of the following disease processes/diagnoses(primary or secondary)?  Other   Week 3 attempt successful?  Yes   Call start time  1403   Call end time  1408   General alerts for this patient  [facility]   Discharge diagnosis  A-fib RVR, ,  Acute UTI, early dementia   Meds reviewed with patient/caregiver?  Yes   Is the patient having any side effects they believe may be caused by any medication additions or changes?  No   Does the patient have all medications ordered at discharge?  Yes   Is the patient taking all medications as directed (includes completed medication regime)?  Yes   Comments regarding appointments  Pt to see cardiology on November 17, 2020.   Does the patient have a primary care provider?   Yes   Comments regarding PCP  Dr Dye- appt scheduled with Dr Dye in the am at 10/27/2020   Does the patient have an appointment with their PCP within 7 days of discharge?  Yes   Has the patient kept scheduled appointments due by today?  Yes   Psychosocial issues?  No   Did the patient receive a copy of their discharge instructions?  Yes   Nursing interventions  Reviewed instructions with patient   What is the patient's perception of their health status since discharge?  Improving   Is the patient/caregiver able to teach back signs and symptoms related to disease process for when to call PCP?  Yes   Is the patient/caregiver able to teach back signs and symptoms related to disease process for when to call 911?  Yes   Is the patient/caregiver able to teach back the hierarchy of who to call/visit for symptoms/problems? PCP, Specialist, Home health nurse, Urgent Care, ED, 911  Yes   If the patient is a current smoker, are they able to teach back resources for cessation?  Not a smoker   Additional teach back comments  Pt very lucid and is able to state her upcoming appointments. She  reports she is doing very well.   Week 3 Call Completed?  Yes          Marly Prakash, RN

## 2020-11-24 ENCOUNTER — RESULTS ENCOUNTER (OUTPATIENT)
Dept: INTERNAL MEDICINE | Facility: CLINIC | Age: 80
End: 2020-11-24

## 2020-11-24 DIAGNOSIS — E05.90 HYPERTHYROIDISM: ICD-10-CM

## 2020-12-10 ENCOUNTER — OFFICE VISIT (OUTPATIENT)
Dept: CARDIOLOGY | Facility: CLINIC | Age: 80
End: 2020-12-10

## 2020-12-10 VITALS
HEIGHT: 64 IN | HEART RATE: 96 BPM | OXYGEN SATURATION: 97 % | BODY MASS INDEX: 28.51 KG/M2 | RESPIRATION RATE: 18 BRPM | WEIGHT: 167 LBS | SYSTOLIC BLOOD PRESSURE: 124 MMHG | DIASTOLIC BLOOD PRESSURE: 70 MMHG

## 2020-12-10 DIAGNOSIS — Z98.890 HISTORY OF TRICUSPID VALVE REPAIR: ICD-10-CM

## 2020-12-10 DIAGNOSIS — I48.19 PERSISTENT ATRIAL FIBRILLATION (HCC): Primary | ICD-10-CM

## 2020-12-10 DIAGNOSIS — I10 ESSENTIAL HYPERTENSION: ICD-10-CM

## 2020-12-10 DIAGNOSIS — Z98.890 HISTORY OF CARDIAC RADIOFREQUENCY ABLATION: ICD-10-CM

## 2020-12-10 DIAGNOSIS — Z98.890 HISTORY OF MITRAL VALVE REPAIR: ICD-10-CM

## 2020-12-10 DIAGNOSIS — I51.9 LV DYSFUNCTION: ICD-10-CM

## 2020-12-10 PROCEDURE — 99214 OFFICE O/P EST MOD 30 MIN: CPT | Performed by: INTERNAL MEDICINE

## 2020-12-10 RX ORDER — CHOLECALCIFEROL (VITAMIN D3) 25 MCG
1 TABLET ORAL NIGHTLY
COMMUNITY
End: 2021-01-27

## 2020-12-10 RX ORDER — PANTOPRAZOLE SODIUM 40 MG/1
40 TABLET, DELAYED RELEASE ORAL DAILY
COMMUNITY
End: 2021-01-27

## 2020-12-10 RX ORDER — METOPROLOL SUCCINATE 100 MG/1
100 TABLET, EXTENDED RELEASE ORAL DAILY
Qty: 90 TABLET | Refills: 2 | Status: SHIPPED | OUTPATIENT
Start: 2020-12-10 | End: 2020-12-10 | Stop reason: SDUPTHER

## 2020-12-10 RX ORDER — METOPROLOL SUCCINATE 50 MG/1
50 TABLET, EXTENDED RELEASE ORAL DAILY
Qty: 90 TABLET | Refills: 3 | Status: SHIPPED | OUTPATIENT
Start: 2020-12-10 | End: 2021-03-11

## 2020-12-10 NOTE — PROGRESS NOTES
Subjective:     Encounter Date:12/10/2020      Patient ID: Margaret Wilson is a 80 y.o. female.    Chief Complaint:  Chief Complaint   Patient presents with   • Atrial Fibrillation   • Hypertension   • Cardiac Valve Problem   • Establish Care       HPI:  History of Present Illness     I had the pleasure of seeing Ms. Wilson in the office today.  She is a patient that is well-known to me from the past.  I have not seen her since 2018.  She has a history of mitral and tricuspid valve repair in 2011.  Cardiac catheterization at that time revealed normal coronary arteries.  She has a history of atrial fibrillation in the past.  She was on anticoagulation but developed significant GI bleed and therefore her anticoagulation was discontinued in 2012.  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  She also has a history of essential hypertension, thyroid disorder (recently was in the hospital and found to be hyperthyroid), GERD, cognitive impairment, COPD and prior history of BOOP.  She had a prior history of sleep apnea but states that her last sleep study was negative.    Margaret was recently admitted to [facility] and found to have a urinary tract infection and was hyperthyroid.  Upon admission, she was noted to be in atrial fibrillation with rapid ventricular response.  Her beta-blocker therapy was increased and she was placed on apixaban at time of discharge, she was on 75 mg of metoprolol extended release.  She had an echocardiogram during her hospital stay which demonstrated an ejection fraction of 49%.  She had mild mitral and tricuspid insufficiency.  Margaret states that she does notice palpitations on occasion.  She is currently in atrial fibrillation with a moderate ventricular response and is unaware.  She does have dyspnea with exertion.  No chest discomfort.  She denies lower extremity edema, orthopnea or paroxysmal nocturnal dyspnea.  I did  review her medications with her and she is not sure if she is taking the correct dose of metoprolol.  She initially states she was not on blood thinners but then I mentioned Eliquis and she said that she was taking that medication  The following portions of the patient's history were reviewed and updated as appropriate: allergies, current medications, past family history, past medical history, past social history, past surgical history and problem list.    Problem List:  Patient Active Problem List   Diagnosis   • Adrenal insufficiency (CMS/HCC)   • PVCs (premature ventricular contractions)   • Essential hypertension   • Depression   • Valvular heart disease   • Mild cognitive impairment   • Chronic paroxysmal hemicrania, not intractable   • Atrial fibrillation (CMS/HCC)   • Iatrogenic hyperthyroidism   • GERD (gastroesophageal reflux disease)   • COPD (chronic obstructive pulmonary disease) (CMS/HCC)   • Acute UTI (urinary tract infection)   • History of mitral valve repair   • History of tricuspid valve repair   • History of cardiac radiofrequency ablation   • History of cardiac catheterization   • LV dysfunction       Past Medical History:  Past Medical History:   Diagnosis Date   • Adrenal insufficiency (CMS/HCC)    • Anxiety    • Arthritis    • Asthma    • Atrial fibrillation (CMS/HCC)    • CHF (congestive heart failure) (CMS/HCC)    • Colon tumor    • COPD (chronic obstructive pulmonary disease) (CMS/HCC)    • Dementia (CMS/HCC)    • Depression    • Depression    • Difficulty walking    • GERD (gastroesophageal reflux disease)    • Goiter    • Headache, tension-type    • Hypertension    • Hypocalcemia    • Hypokalemia    • Memory loss    • Migraine    • Mild cognitive impairment    • Sleep apnea    • Valvular heart disease        Past Surgical History:  Past Surgical History:   Procedure Laterality Date   • APPENDECTOMY     • BACK SURGERY      6 back surgeries   • BREAST SURGERY     • CARDIAC ABLATION     •  "CARDIAC ABLATION  2013   • CATARACT EXTRACTION, BILATERAL Bilateral    • CHOLECYSTECTOMY     • HYSTERECTOMY     • JOINT REPLACEMENT Bilateral     shoulder   • MITRAL VALVE REPAIR/REPLACEMENT  2011   • OOPHORECTOMY     • TONSILLECTOMY AND ADENOIDECTOMY     • TRICUSPID VALVE SURGERY  2011    Repair       Social History:  Social History     Socioeconomic History   • Marital status:      Spouse name: Not on file   • Number of children: Not on file   • Years of education: Not on file   • Highest education level: Not on file   Tobacco Use   • Smoking status: Never Smoker   • Smokeless tobacco: Never Used   Substance and Sexual Activity   • Alcohol use: Not Currently   • Drug use: Never   • Sexual activity: Defer       Allergies:  Allergies   Allergen Reactions   • Dilaudid [Hydromorphone Hcl] Anaphylaxis   • Ace Inhibitors Cough   • Bactrim [Sulfamethoxazole-Trimethoprim] Hives and Itching   • Chloraprep One Step [Chlorhexidine Gluconate] Rash     Red and hot   • Ciprofloxacin Rash   • Codeine Unknown (See Comments)     Unknown     • Keflex [Cephalexin] Unknown (See Comments)     Unknown; pt tolerated ceftriaxone on 10/22/2020     • Latex Rash   • Penicillins Rash     Rash required tx with \"pill\" doctor prescribed but no ED visit; pt tolerated ceftriaxone on 10/22/2020         Immunizations:  Immunization History   Administered Date(s) Administered   • Fluzone High Dose =>65 Years ([employer] ONLY) 10/23/2017, 10/16/2018, 11/06/2019   • Influenza, Unspecified 10/23/2017   • Pneumococcal Polysaccharide (PPSV23) 12/31/2004       ROS:  Review of Systems   Constitution: Positive for malaise/fatigue. Negative for chills, decreased appetite, fever, weight gain and weight loss.   HENT: Negative for congestion, hoarse voice, nosebleeds and sore throat.    Eyes: Negative for blurred vision, double vision and visual disturbance.   Cardiovascular: Positive for dyspnea on exertion and palpitations. Negative for chest pain, " "claudication, irregular heartbeat, leg swelling, near-syncope, orthopnea, paroxysmal nocturnal dyspnea and syncope.   Respiratory: Negative for cough, hemoptysis, shortness of breath, sleep disturbances due to breathing, snoring, sputum production and wheezing.    Endocrine: Negative for cold intolerance, heat intolerance, polydipsia and polyuria.   Hematologic/Lymphatic: Negative for adenopathy and bleeding problem. Does not bruise/bleed easily.   Skin: Negative for flushing, itching, nail changes and rash.   Musculoskeletal: Negative for arthritis, back pain, joint pain, muscle cramps, muscle weakness, myalgias and neck pain.   Gastrointestinal: Negative for bloating, abdominal pain, anorexia, change in bowel habit, constipation, diarrhea, heartburn, hematemesis, hematochezia, jaundice, melena, nausea and vomiting.   Genitourinary: Negative for dysuria, hematuria and nocturia.   Neurological: Negative for brief paralysis, disturbances in coordination, excessive daytime sleepiness, dizziness, headaches, light-headedness, loss of balance, numbness, paresthesias, seizures and vertigo.   Psychiatric/Behavioral: Negative for altered mental status and depression. The patient is not nervous/anxious.    Allergic/Immunologic: Negative for environmental allergies and hives.          Objective:         /70 (BP Location: Left arm, Patient Position: Sitting, Cuff Size: Large Adult)   Pulse 96   Resp 18   Ht 162.6 cm (64\")   Wt 75.8 kg (167 lb)   SpO2 97%   BMI 28.67 kg/m²     Vitals signs reviewed.   Constitutional:       Appearance: Healthy appearance. Not in distress.   Neck:      Musculoskeletal: Normal range of motion and neck supple.      Vascular: No JVR. JVD normal.   Pulmonary:      Effort: Pulmonary effort is normal.      Breath sounds: Normal breath sounds. No wheezing. No rhonchi. No rales.   Chest:      Chest wall: Not tender to palpatation.      Comments: Healed surgical scar  Cardiovascular:      PMI " at left midclavicular line. Tachycardia present. Irregularly irregular rhythm. Normal S1. Normal S2.      Murmurs: There is a systolic murmur.  There is a 1/6 systolic murmur heard at the left lower sternal border      No gallop. No click. No rub.   Pulses:     Intact distal pulses.   Edema:     Peripheral edema absent.   Abdominal:      General: Bowel sounds are normal.      Palpations: Abdomen is soft.      Tenderness: There is no abdominal tenderness.   Musculoskeletal: Normal range of motion.         General: No tenderness.   Skin:     General: Skin is warm and dry.   Neurological:      General: No focal deficit present.      Mental Status: Alert and oriented to person, place and time. Exhibits a cognitive deficit.      Gait: Gait is intact.         In-Office Procedure(s):  Procedures    ASCVD RIsk Score::  The ASCVD Risk score (Leslieapolinar LONG Jr., et al., 2013) failed to calculate for the following reasons:    The 2013 ASCVD risk score is only valid for ages 40 to 79    Recent Radiology:  Imaging Results (Most Recent)     None          Lab Review:   not applicable             Assessment:          Diagnosis Plan   1. Persistent atrial fibrillation (CMS/HCC)  metoprolol succinate XL (TOPROL-XL) 100 MG 24 hr tablet   2. History of mitral valve repair     3. History of tricuspid valve repair     4. Essential hypertension     5. History of cardiac radiofrequency ablation     6. LV dysfunction            Plan:   1.  Atrial fibrillation, persistent  Ms. Wilson remains in atrial fibrillation.  Duration of her atrial fibrillation is unknown.  She was noted to be in sinus rhythm on EKG in February 2020.  Her current rate is approximately 100 bpm.  I am going to increase her metoprolol to 100 mg daily.  I have written out for her to stop her current dose of 75 mg daily and to start the 100 mg tablets daily.  She states that she would call when she got home and review all of her medications, which she did not bring with her  to the office today.  She states she is taking apixaban and she is currently on aspirin.  She will need to be monitored carefully since she has had GI bleeds in the past with anticoagulants and aspirin.  She has had no recent GI bleed issues that she can remember.    2.  Valvular heart disease  She is status post mitral and tricuspid valve repair in 2011.  Her echocardiogram demonstrated only mild mitral and tricuspid insufficiency.    3.  Essential hypertension  Her blood pressure is currently well controlled    4.  History of previous ablation  She has had prior radiofrequency ablation therapy for AV sergio reentrant tachycardia in 2013    5.  LV dysfunction  Her ejection fraction during her hospital stay was 49%.  This may be tachycardia mediated.  We will need to slow her heart rate down a bit and recheck.  Her heart rate may be difficult to control secondary to her current hyperthyroid state.       Addendum:  The patient did call back with a list of her current medications.  She has not been taking Eliquis.  She took metoprolol for 1 month and then stop the medication because she did not know that she is post to restart it.  I am going to cancel her prescription for the metoprolol extended release 100 mg and send in a prescription for metoprolol extended release 50 mg to take 1 tablet daily.  She has been informed that she needs to restart her Eliquis 5 mg twice daily and discontinue aspirin.  Level of Care:                 Stephany Lizama MD  12/10/20  .

## 2020-12-18 RX ORDER — POTASSIUM CHLORIDE 750 MG/1
TABLET, EXTENDED RELEASE ORAL
Qty: 90 TABLET | Refills: 0 | Status: SHIPPED | OUTPATIENT
Start: 2020-12-18 | End: 2021-02-25

## 2021-01-27 ENCOUNTER — TELEPHONE (OUTPATIENT)
Dept: PEDIATRICS | Facility: OTHER | Age: 81
End: 2021-01-27

## 2021-01-27 ENCOUNTER — OFFICE VISIT (OUTPATIENT)
Dept: INTERNAL MEDICINE | Facility: CLINIC | Age: 81
End: 2021-01-27

## 2021-01-27 VITALS
BODY MASS INDEX: 29.53 KG/M2 | WEIGHT: 173 LBS | DIASTOLIC BLOOD PRESSURE: 70 MMHG | HEIGHT: 64 IN | TEMPERATURE: 97.3 F | HEART RATE: 74 BPM | SYSTOLIC BLOOD PRESSURE: 134 MMHG

## 2021-01-27 DIAGNOSIS — R26.9 GAIT DISTURBANCE: ICD-10-CM

## 2021-01-27 DIAGNOSIS — N95.0 POSTMENOPAUSAL BLEEDING: ICD-10-CM

## 2021-01-27 DIAGNOSIS — I10 ESSENTIAL HYPERTENSION: ICD-10-CM

## 2021-01-27 DIAGNOSIS — I48.19 PERSISTENT ATRIAL FIBRILLATION (HCC): Primary | ICD-10-CM

## 2021-01-27 DIAGNOSIS — E27.40 ADRENAL INSUFFICIENCY (HCC): ICD-10-CM

## 2021-01-27 DIAGNOSIS — R53.82 CHRONIC FATIGUE: ICD-10-CM

## 2021-01-27 DIAGNOSIS — F34.1 DYSTHYMIA: ICD-10-CM

## 2021-01-27 LAB
BILIRUB BLD-MCNC: NEGATIVE MG/DL
CLARITY, POC: CLEAR
COLOR UR: YELLOW
GLUCOSE UR STRIP-MCNC: NEGATIVE MG/DL
KETONES UR QL: NEGATIVE
LEUKOCYTE EST, POC: ABNORMAL
NITRITE UR-MCNC: NEGATIVE MG/ML
PH UR: 6 [PH] (ref 5–8)
PROT UR STRIP-MCNC: NEGATIVE MG/DL
RBC # UR STRIP: NEGATIVE /UL
SP GR UR: 1.01 (ref 1–1.03)
UROBILINOGEN UR QL: NORMAL

## 2021-01-27 PROCEDURE — 81003 URINALYSIS AUTO W/O SCOPE: CPT | Performed by: INTERNAL MEDICINE

## 2021-01-27 PROCEDURE — 99214 OFFICE O/P EST MOD 30 MIN: CPT | Performed by: INTERNAL MEDICINE

## 2021-01-27 RX ORDER — NITROFURANTOIN 25; 75 MG/1; MG/1
100 CAPSULE ORAL 2 TIMES DAILY
Qty: 14 CAPSULE | Refills: 0 | Status: SHIPPED | OUTPATIENT
Start: 2021-01-27 | End: 2021-03-11

## 2021-01-27 NOTE — PROGRESS NOTES
"Chief Complaint  Neck Pain (left side) and Dizziness    Subjective          Margaret Wilson presents to [facility] for   History of Present Illness  Here for reg f/u with her - \"lots of issues\" feels fatigued, depressed.  Her  says her memeory is worse. Gets a pressure headache across the forehead- notices it most when she gets more tired.    Stopped Eliquis due to cost- saw Dr. Lizama in Dec- increased metoprolol then for HR of 100- not sure she feels better. Some question about the meto[prolol dosing but she has been taking 100 mg.  Gets fatigued/SOB taking a shower.  Tries to stay as active as she can- walking halls, etc.  Uses rollator for security.  She and  do admit to sitting around a lot but they don't due much due to pandemic.   Has some pain in her vagina- not only when she urinates- notices occ blood, related to sex and otherwise.  She leaks urine a ll of the time and has some pelvic pressure.      Objective   Vital Signs:   Temp 97.3 °F (36.3 °C)   Ht 162.6 cm (64\")   Wt 78.5 kg (173 lb)   BMI 29.70 kg/m²     Physical Exam  Cardiovascular:      Rate and Rhythm: Normal rate. Rhythm irregular.   Pulmonary:      Effort: Pulmonary effort is normal.      Breath sounds: Normal breath sounds.   Genitourinary:     Vagina: No erythema, bleeding or lesions. Tenderness: mild.   Musculoskeletal:         General: No swelling.   Neurological:      Mental Status: She is alert.      Gait: Gait abnormal.        Result Review :                 Assessment and Plan    Problem List Items Addressed This Visit     None          Follow Up   No follow-ups on file.  Patient was given instructions and counseling regarding her condition or for health maintenance advice. Please see specific information pulled into the AVS if appropriate.       "

## 2021-01-28 ENCOUNTER — TELEPHONE (OUTPATIENT)
Dept: INTERNAL MEDICINE | Facility: CLINIC | Age: 81
End: 2021-01-28

## 2021-01-28 NOTE — TELEPHONE ENCOUNTER
Hub staff attempted to follow warm transfer process and was unsuccessful     Caller: Margaret Wilson    Relationship to patient: Self    Best call back number: [phone]   Patient is needing:SCHEDULE LABS

## 2021-02-22 ENCOUNTER — TELEPHONE (OUTPATIENT)
Dept: INTERNAL MEDICINE | Facility: CLINIC | Age: 81
End: 2021-02-22

## 2021-02-22 DIAGNOSIS — G31.84 MILD COGNITIVE IMPAIRMENT: Primary | ICD-10-CM

## 2021-02-22 NOTE — TELEPHONE ENCOUNTER
Looks like you did this last March For Dr. Diamond.  Do you want to send her to someone else, or resend to Dr. Diamond?

## 2021-02-22 NOTE — TELEPHONE ENCOUNTER
PATIENT IS REQUESTING A REFERRAL TO SEE A NEUROLOGIST DUE TO HAVING RECENT MEMORY ISSUES.    PLEASE ADVISE.    CALL BACK: [phone]

## 2021-02-25 RX ORDER — FUROSEMIDE 20 MG/1
TABLET ORAL
Qty: 90 TABLET | Refills: 3 | Status: SHIPPED | OUTPATIENT
Start: 2021-02-25 | End: 2022-03-01 | Stop reason: SDUPTHER

## 2021-02-25 RX ORDER — POTASSIUM CHLORIDE 750 MG/1
TABLET, EXTENDED RELEASE ORAL
Qty: 90 TABLET | Refills: 3 | Status: SHIPPED | OUTPATIENT
Start: 2021-02-25 | End: 2022-03-01 | Stop reason: SDUPTHER

## 2021-03-02 ENCOUNTER — TELEPHONE (OUTPATIENT)
Dept: INTERNAL MEDICINE | Facility: CLINIC | Age: 81
End: 2021-03-02

## 2021-03-02 NOTE — TELEPHONE ENCOUNTER
Caller: Margaret Wilson    Relationship to patient: Self    Best call back number: [phone]    Patient is needing: PATIENT SAID THAT SHE RECEIVED A CALL FROM HER MAIL ORDER PHARMACY. SHE WAS ADVISED THAT DR. NAVARRO NEEDED TO CALL THEM AND LET THEM KNOW THAT IT IS OKAY TO REFILL POTASSIUM CHLORIDE 10 MEQ AND FUROSEMIDE 20 MG.     PHARMACY CAN BE REACHED [phone].

## 2021-03-05 RX ORDER — MONTELUKAST SODIUM 10 MG/1
TABLET ORAL
Qty: 90 TABLET | Refills: 3 | Status: SHIPPED | OUTPATIENT
Start: 2021-03-05 | End: 2022-03-01 | Stop reason: SDUPTHER

## 2021-03-11 ENCOUNTER — TELEMEDICINE (OUTPATIENT)
Dept: CARDIOLOGY | Facility: CLINIC | Age: 81
End: 2021-03-11

## 2021-03-11 VITALS — WEIGHT: 165 LBS | BODY MASS INDEX: 28.17 KG/M2 | HEIGHT: 64 IN

## 2021-03-11 DIAGNOSIS — Z98.890 HISTORY OF CARDIAC CATHETERIZATION: ICD-10-CM

## 2021-03-11 DIAGNOSIS — Z98.890 HISTORY OF MITRAL VALVE REPAIR: Primary | ICD-10-CM

## 2021-03-11 DIAGNOSIS — I10 ESSENTIAL HYPERTENSION: ICD-10-CM

## 2021-03-11 DIAGNOSIS — I48.0 PAROXYSMAL ATRIAL FIBRILLATION (HCC): ICD-10-CM

## 2021-03-11 DIAGNOSIS — I48.19 PERSISTENT ATRIAL FIBRILLATION (HCC): ICD-10-CM

## 2021-03-11 DIAGNOSIS — Z98.890 HISTORY OF TRICUSPID VALVE REPAIR: ICD-10-CM

## 2021-03-11 PROCEDURE — 99442 PR PHYS/QHP TELEPHONE EVALUATION 11-20 MIN: CPT | Performed by: INTERNAL MEDICINE

## 2021-03-11 RX ORDER — METOPROLOL SUCCINATE 100 MG/1
100 TABLET, EXTENDED RELEASE ORAL DAILY
Qty: 90 TABLET | Refills: 2 | Status: SHIPPED | OUTPATIENT
Start: 2021-03-11 | End: 2021-03-11 | Stop reason: DRUGHIGH

## 2021-03-11 RX ORDER — METOPROLOL SUCCINATE 100 MG/1
100 TABLET, EXTENDED RELEASE ORAL DAILY
COMMUNITY
End: 2021-12-23 | Stop reason: SDUPTHER

## 2021-03-11 NOTE — PROGRESS NOTES
"Chief Complaint  Atrial Fibrillation, Hypertension, and Cardiac Valve Problem    Subjective    History of Present Illness      You have chosen to receive care through a telephone visit. Do you consent to use a telephone visit for your medical care today? Yes    I had a telephone visit with Margaret today.   She has a history of mitral and tricuspid valve repair in 2011.  Cardiac catheterization at that time revealed normal coronary arteries.  She has a history of atrial fibrillation in the past.  She was on anticoagulation but developed significant GI bleed and therefore her anticoagulation was discontinued in 2012.  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  She also has a history of essential hypertension, thyroid disorder (recently was in the hospital and found to be hyperthyroid), GERD, cognitive impairment, COPD and prior history of BOOP.  She had a prior history of sleep apnea but states that her last sleep study was negative.    On her last visit, I had placed her on metoprolol extended release 50 mg daily because she had not taken any beta-blocker for over a month.  She is now on 100 mg daily.  She states that her heart rate is sometimes high and then sometimes low.  She does have dyspnea on exertion but most of the time this is an occurrence with an elevated heart rate.  Her TSH remains low suggesting an ongoing hyperthyroid state.  I am going to continue to treat her with beta-blockers until she is euthyroid and at that juncture can consider cardioversion if she is still in atrial fibrillation.  She is on Eliquis and having no evidence of bleeding.  No complaints of chest discomfort or lower extremity edema.  She denies orthopnea or paroxysmal nocturnal dyspnea.      Objective   Vital Signs:   Ht 162.6 cm (64\")   Wt 74.8 kg (165 lb)   BMI 28.32 kg/m²     Physical Exam   Unable to perform physical exam secondary to telephone visit.  Result Review :   The " following data was reviewed by: Stephany Lizama MD on 03/11/2021:  CMP    CMP 10/23/20 10/24/20 10/25/20   Glucose 80 91 88   BUN 14 13 9   Creatinine 0.78 0.78 0.54 (A)   eGFR Non African Am 71 71 108   Sodium 141 142 140   Potassium 3.4 (A) 3.9 3.5   Chloride 104 106 107   Calcium 9.1 8.8 8.7   Albumin   3.50   (A) Abnormal value       Comments are available for some flowsheets but are not being displayed.               TSH    TSH 10/24/20 10/25/20 11/2/20   TSH <0.005 (A) <0.005 (A) 0.006 (A)   (A) Abnormal value                      Assessment and Plan    1. History of mitral valve repair  December 2011: Mitral valve repair with remodeling annuloplasty using 26 mm [employer] physiotwo annuloplasty ring    2. History of tricuspid valve repair  December 2011: Remodeling annuloplasty ring using number 26 mm [employer] MC 3 annuloplasty ring    3. History of cardiac catheterization  October 28, 2011: Normal coronary arteries.  EF 65%.    4. Paroxysmal atrial fibrillation (CMS/HCC)  She has had multiple episodes of atrial fibrillation over the years.  She is currently on beta-blocker therapy at 100 mg daily.  I have told her that she could take an additional 50 if her heart rate is elevated and blood pressure is above 100 to 110 mmHg.  She states that she is taking her Eliquis and not having any evidence of bleeding.  We will continue to treat with beta-blocker therapy until she is euthyroid.  At that point we will consider cardioversion    5. Essential hypertension  Controlled    Length of telephone visit was 15 minutes        Follow Up   No follow-ups on file.  Patient was given instructions and counseling regarding her condition or for health maintenance advice. Please see specific information pulled into the AVS if appropriate.

## 2021-03-19 RX ORDER — CITALOPRAM 20 MG/1
TABLET ORAL
Qty: 90 TABLET | Refills: 0 | Status: SHIPPED | OUTPATIENT
Start: 2021-03-19 | End: 2021-05-24 | Stop reason: SDUPTHER

## 2021-03-31 ENCOUNTER — TELEPHONE (OUTPATIENT)
Dept: INTERNAL MEDICINE | Facility: CLINIC | Age: 81
End: 2021-03-31

## 2021-03-31 ENCOUNTER — OFFICE VISIT (OUTPATIENT)
Dept: INTERNAL MEDICINE | Facility: CLINIC | Age: 81
End: 2021-03-31

## 2021-03-31 VITALS
WEIGHT: 160 LBS | SYSTOLIC BLOOD PRESSURE: 118 MMHG | DIASTOLIC BLOOD PRESSURE: 66 MMHG | HEART RATE: 76 BPM | BODY MASS INDEX: 27.31 KG/M2 | TEMPERATURE: 97.3 F | HEIGHT: 64 IN

## 2021-03-31 DIAGNOSIS — I10 ESSENTIAL HYPERTENSION: ICD-10-CM

## 2021-03-31 DIAGNOSIS — R79.89 LOW TSH LEVEL: ICD-10-CM

## 2021-03-31 DIAGNOSIS — G31.84 MILD COGNITIVE IMPAIRMENT: Primary | ICD-10-CM

## 2021-03-31 DIAGNOSIS — E27.40 ADRENAL INSUFFICIENCY (HCC): ICD-10-CM

## 2021-03-31 DIAGNOSIS — I48.0 PAROXYSMAL ATRIAL FIBRILLATION (HCC): ICD-10-CM

## 2021-03-31 LAB
ALBUMIN SERPL-MCNC: 4.2 G/DL (ref 3.5–5.2)
ALBUMIN/GLOB SERPL: 1.9 G/DL
ALP SERPL-CCNC: 95 U/L (ref 39–117)
ALT SERPL-CCNC: 20 U/L (ref 1–33)
AST SERPL-CCNC: 23 U/L (ref 1–32)
BASOPHILS # BLD AUTO: 0.06 10*3/MM3 (ref 0–0.2)
BASOPHILS NFR BLD AUTO: 0.8 % (ref 0–1.5)
BILIRUB SERPL-MCNC: 2 MG/DL (ref 0–1.2)
BUN SERPL-MCNC: 12 MG/DL (ref 8–23)
BUN/CREAT SERPL: 14.8 (ref 7–25)
CALCIUM SERPL-MCNC: 9.5 MG/DL (ref 8.6–10.5)
CHLORIDE SERPL-SCNC: 106 MMOL/L (ref 98–107)
CO2 SERPL-SCNC: 27.3 MMOL/L (ref 22–29)
CREAT SERPL-MCNC: 0.81 MG/DL (ref 0.57–1)
EOSINOPHIL # BLD AUTO: 0.12 10*3/MM3 (ref 0–0.4)
EOSINOPHIL NFR BLD AUTO: 1.7 % (ref 0.3–6.2)
ERYTHROCYTE [DISTWIDTH] IN BLOOD BY AUTOMATED COUNT: 12.9 % (ref 12.3–15.4)
GLOBULIN SER CALC-MCNC: 2.2 GM/DL
GLUCOSE SERPL-MCNC: 79 MG/DL (ref 65–99)
HCT VFR BLD AUTO: 42.7 % (ref 34–46.6)
HGB BLD-MCNC: 13.8 G/DL (ref 12–15.9)
IMM GRANULOCYTES # BLD AUTO: 0.03 10*3/MM3 (ref 0–0.05)
IMM GRANULOCYTES NFR BLD AUTO: 0.4 % (ref 0–0.5)
LYMPHOCYTES # BLD AUTO: 1.36 10*3/MM3 (ref 0.7–3.1)
LYMPHOCYTES NFR BLD AUTO: 18.9 % (ref 19.6–45.3)
MCH RBC QN AUTO: 30.9 PG (ref 26.6–33)
MCHC RBC AUTO-ENTMCNC: 32.3 G/DL (ref 31.5–35.7)
MCV RBC AUTO: 95.7 FL (ref 79–97)
MONOCYTES # BLD AUTO: 0.69 10*3/MM3 (ref 0.1–0.9)
MONOCYTES NFR BLD AUTO: 9.6 % (ref 5–12)
NEUTROPHILS # BLD AUTO: 4.93 10*3/MM3 (ref 1.7–7)
NEUTROPHILS NFR BLD AUTO: 68.6 % (ref 42.7–76)
NRBC BLD AUTO-RTO: 0 /100 WBC (ref 0–0.2)
PLATELET # BLD AUTO: 173 10*3/MM3 (ref 140–450)
POTASSIUM SERPL-SCNC: 4.4 MMOL/L (ref 3.5–5.2)
PROT SERPL-MCNC: 6.4 G/DL (ref 6–8.5)
RBC # BLD AUTO: 4.46 10*6/MM3 (ref 3.77–5.28)
SODIUM SERPL-SCNC: 143 MMOL/L (ref 136–145)
TSH SERPL DL<=0.005 MIU/L-ACNC: 0.02 UIU/ML (ref 0.27–4.2)
VIT B12 SERPL-MCNC: 376 PG/ML (ref 211–946)
WBC # BLD AUTO: 7.19 10*3/MM3 (ref 3.4–10.8)

## 2021-03-31 PROCEDURE — 99214 OFFICE O/P EST MOD 30 MIN: CPT | Performed by: INTERNAL MEDICINE

## 2021-03-31 RX ORDER — PREDNISONE 10 MG/1
10 TABLET ORAL DAILY
Qty: 30 TABLET | Refills: 1 | Status: SHIPPED | OUTPATIENT
Start: 2021-03-31 | End: 2021-03-31

## 2021-03-31 NOTE — PROGRESS NOTES
"Chief Complaint  Hypertension    Subjective          Margaret Wilson presents to [facility]  History of Present Illness   Here with - they think things are better- walking/balance seems better.  Less use of walker in the house.   She does not have much dizziness any longer. Her balance isn't great but she is careful about getting up and down.  Once she's up she does  Much better.   Her memory continues to decline- needs a lot of clues and assistance from .  She is able to walk around the grounds of their complex on her own.  She has not gotten lost, etc.   Denies any CP, SOB. Her appetitie remains good but has lost about 5 lbs this month. No bowel changes.  She has stopped taking the NOAC due to cost- discussed with cardiology but wanted to discuss warfariin with me before starting.  Her  is on warfarin so she could go with him to get protimes checked.       Objective   Vital Signs:   /66   Pulse 76   Temp 97.3 °F (36.3 °C)   Ht 162.6 cm (64\")   Wt 72.6 kg (160 lb)   BMI 27.46 kg/m²     Physical Exam  Constitutional:       General: She is not in acute distress.  Eyes:      Conjunctiva/sclera: Conjunctivae normal.   Cardiovascular:      Rate and Rhythm: Normal rate. Rhythm irregular.   Pulmonary:      Effort: Pulmonary effort is normal.   Abdominal:      General: Bowel sounds are normal.   Musculoskeletal:      Right lower leg: Right lower leg edema: minimal.      Left lower leg: Left lower leg edema: minimal.   Lymphadenopathy:      Cervical: No cervical adenopathy.   Skin:     General: Skin is warm and dry.   Neurological:      Mental Status: She is oriented to person, place, and time. Mental status is at baseline.      Gait: Abnormal gait: uses cane and her  for balance.   Psychiatric:         Mood and Affect: Mood normal.         Thought Content: Thought content normal.        Result Review :                 Assessment and Plan    Diagnoses " and all orders for this visit:    1. Mild cognitive impairment (Primary)  Comments:  seems stable-  keeps her safe    2. Adrenal insufficiency (CMS/HCC)  Comments:  ? related to balance- I am going to try to increase her steroid to 20 mg daily and recheck in 3 weeks.  Call if more of a problem.  Also seeing neurology.    3. Essential hypertension  Comments:  well controlled, denies orthostasis    4. Paroxysmal atrial fibrillation (CMS/HCC)  Comments:  d/w - should be on warfarin since can't afford the NOAC.  Will order and have her f/u    5. Low TSH level  Comments:  recheck today- very little symptoms of hyperthyroid.      Other orders  -     Discontinue: predniSONE (DELTASONE) 10 MG tablet; Take 1 tablet by mouth Daily.  Dispense: 30 tablet; Refill: 1        Follow Up   Return in about 4 weeks (around 4/28/2021) for Recheck.  Patient was given instructions and counseling regarding her condition or for health maintenance advice. Please see specific information pulled into the AVS if appropriate.

## 2021-03-31 NOTE — TELEPHONE ENCOUNTER
Caller: Margaret Wilson    Relationship: Self    Best call back number: [phone]    What medications are you currently taking:   Current Outpatient Medications on File Prior to Visit   Medication Sig Dispense Refill   • apixaban (ELIQUIS) 5 MG tablet tablet Take 1 tablet by mouth Every 12 (Twelve) Hours. Indications: Atrial Fibrillation 180 tablet 3   • citalopram (CeleXA) 20 MG tablet TAKE 1 TABLET DAILY. NEED  APPOINTMENT. 90 tablet 0   • donepezil (ARICEPT) 10 MG tablet TAKE 1 TABLET BY MOUTH EVERY NIGHT 90 tablet 3   • furosemide (LASIX) 20 MG tablet TAKE 1 TABLET DAILY 90 tablet 3   • KLOR-CON 10 MEQ CR tablet TAKE 1 TABLET DAILY 90 tablet 3   • metoprolol succinate XL (TOPROL-XL) 100 MG 24 hr tablet Take 100 mg by mouth Daily.     • montelukast (SINGULAIR) 10 MG tablet TAKE 1 TABLET EVERY NIGHT 90 tablet 3   • VITAMIN D PO Take 1,500 mg by mouth Daily.     • [DISCONTINUED] KLOR-CON 10 MEQ CR tablet TAKE 1 TABLET DAILY 90 tablet 0   • [DISCONTINUED] predniSONE (DELTASONE) 10 MG tablet Take 1 tablet by mouth Daily. 30 tablet 1   • [DISCONTINUED] predniSONE (DELTASONE) 5 MG tablet Take 1 tablet by mouth Daily. 90 tablet 3     No current facility-administered medications on file prior to visit.        When did you start taking these medications: NEW MEDICATION 3-31-21    Which medication are you concerned about:apixaban (ELIQUIS) 5 MG tablet tablet       Who prescribed you this medication: RAMON     What are your concerns: PATIENT IS CONFUSED ABOUT WHAT MEDICATIONS SHE SHOULD BE TAKING. SHE STATED THAT DR NAVARRO TOOK HER OFF OF PREDNISONE AND ADDED APIXABAN. PATIENT IS ASKING IF SHE IS TO START TAKING APIXABAN DAILY?    PLEASE CALL AND DISCUSS MEDICATIONS WITH PATIENT.

## 2021-04-01 ENCOUNTER — TELEPHONE (OUTPATIENT)
Dept: INTERNAL MEDICINE | Facility: CLINIC | Age: 81
End: 2021-04-01

## 2021-04-01 RX ORDER — WARFARIN SODIUM 5 MG/1
TABLET ORAL
Qty: 30 TABLET | Refills: 0 | Status: SHIPPED | OUTPATIENT
Start: 2021-04-01 | End: 2021-05-06

## 2021-04-01 NOTE — TELEPHONE ENCOUNTER
I really do not know where she lives and who would be closer to see- can you just not see her every 6 months?  Also sending in warfarin 5mg daily- needs INR check in 1 week.  If she can't get done at husbands place, needs to come here.

## 2021-04-01 NOTE — TELEPHONE ENCOUNTER
PATIENT STATES DR. NAVARRO SENT HER TO DR. Pedersen FOR MEMORY ISSUES BUT PATIENT DOESN'T WANT TO SEE HER BECAUSE ITS TO FAR OUT THERE FOR HER TO DRIVE SO SHE WOULD FOR THAT TO BE CANCELLED. SHE WOULD LIKE TO KNOW IF THERE ARE ANY OPTIONS CLOSER TO HER... PLEASE CALL TO DISCUSS.     PATIENT CAN BE REACHED AT: [phone]

## 2021-04-08 ENCOUNTER — OFFICE VISIT (OUTPATIENT)
Dept: NEUROLOGY | Facility: CLINIC | Age: 81
End: 2021-04-08

## 2021-04-08 VITALS
WEIGHT: 160 LBS | HEART RATE: 77 BPM | SYSTOLIC BLOOD PRESSURE: 126 MMHG | BODY MASS INDEX: 27.31 KG/M2 | OXYGEN SATURATION: 96 % | HEIGHT: 64 IN | DIASTOLIC BLOOD PRESSURE: 82 MMHG

## 2021-04-08 DIAGNOSIS — E53.8 B12 DEFICIENCY: ICD-10-CM

## 2021-04-08 DIAGNOSIS — G31.84 MILD COGNITIVE IMPAIRMENT: Primary | ICD-10-CM

## 2021-04-08 PROCEDURE — 96372 THER/PROPH/DIAG INJ SC/IM: CPT | Performed by: NURSE PRACTITIONER

## 2021-04-08 PROCEDURE — 99215 OFFICE O/P EST HI 40 MIN: CPT | Performed by: NURSE PRACTITIONER

## 2021-04-08 RX ORDER — CYANOCOBALAMIN 1000 UG/ML
1000 INJECTION, SOLUTION INTRAMUSCULAR; SUBCUTANEOUS ONCE
Status: COMPLETED | OUTPATIENT
Start: 2021-04-08 | End: 2021-04-08

## 2021-04-08 RX ADMIN — CYANOCOBALAMIN 1000 MCG: 1000 INJECTION, SOLUTION INTRAMUSCULAR; SUBCUTANEOUS at 11:22
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
## 2021-04-09 PROBLEM — E53.8 B12 DEFICIENCY: Status: ACTIVE | Noted: 2021-04-09

## 2021-04-12 ENCOUNTER — TELEPHONE (OUTPATIENT)
Dept: INTERNAL MEDICINE | Facility: CLINIC | Age: 81
End: 2021-04-12

## 2021-04-12 RX ORDER — MIRTAZAPINE 15 MG/1
15 TABLET, FILM COATED ORAL NIGHTLY
Qty: 90 TABLET | Refills: 1 | Status: SHIPPED | OUTPATIENT
Start: 2021-04-12 | End: 2021-05-06

## 2021-04-12 NOTE — TELEPHONE ENCOUNTER
Caller: HA CANTU    Relationship: Emergency Contact    Best call back number: [phone]    What is the best time to reach you: ANYTIME     Who are you requesting to speak with (clinical staff, provider,  specific staff member):     What was the call regarding: PATIENT DAUGHTER CALLED AND STATES THAT PATIENT IS FEELING AGGITATED AT NIGHT AND WAKING UP IN THE MIDDLE OF THE NIGHT A LOT COMBATIVE. IS THERE ANYTHING THE PATIENT CAN TAKE TO HELP HER RELAX HER OR WHAT COURSE OF ACTION THEY NEED TO TAKE. PLEASE CALL AND ADVISE.     Do you require a callback: YES

## 2021-04-12 NOTE — TELEPHONE ENCOUNTER
Let's d/c her Celexa and put her on mirtazapine 15 mg at bedtime instead.  I think that will work better.  Let daughter and patient know.

## 2021-04-12 NOTE — TELEPHONE ENCOUNTER
PATIENTS DAUGHTER IS WANTING TO CLARIFY IF PATIENT SHOULD BE TAKING THIS MEDICATION AS OPPOSED TO THE MORNING.      PLEASE ADVISE      CALL BACK  NUMBER IS  [phone]

## 2021-04-12 NOTE — TELEPHONE ENCOUNTER
Spoke with PT's daughter she is going to contact PT and Pt's  will have [facility] call with me with any questions

## 2021-04-15 ENCOUNTER — PROCEDURE VISIT (OUTPATIENT)
Dept: NEUROLOGY | Facility: CLINIC | Age: 81
End: 2021-04-15

## 2021-04-15 DIAGNOSIS — E53.8 B12 DEFICIENCY: Primary | ICD-10-CM

## 2021-04-15 PROCEDURE — 96372 THER/PROPH/DIAG INJ SC/IM: CPT | Performed by: NURSE PRACTITIONER

## 2021-04-15 RX ORDER — CYANOCOBALAMIN 1000 UG/ML
1000 INJECTION, SOLUTION INTRAMUSCULAR; SUBCUTANEOUS WEEKLY
Status: DISCONTINUED | OUTPATIENT
Start: 2021-04-15 | End: 2021-04-22

## 2021-04-15 RX ADMIN — CYANOCOBALAMIN 1000 MCG: 1000 INJECTION, SOLUTION INTRAMUSCULAR; SUBCUTANEOUS at 09:54

## 2021-04-22 ENCOUNTER — PROCEDURE VISIT (OUTPATIENT)
Dept: NEUROLOGY | Facility: CLINIC | Age: 81
End: 2021-04-22

## 2021-04-22 DIAGNOSIS — E53.8 B12 DEFICIENCY: Primary | ICD-10-CM

## 2021-04-22 PROCEDURE — 96372 THER/PROPH/DIAG INJ SC/IM: CPT | Performed by: NURSE PRACTITIONER

## 2021-04-22 RX ORDER — CYANOCOBALAMIN 1000 UG/ML
1000 INJECTION, SOLUTION INTRAMUSCULAR; SUBCUTANEOUS WEEKLY
Status: DISCONTINUED | OUTPATIENT
Start: 2021-04-22 | End: 2021-04-29

## 2021-04-22 RX ADMIN — CYANOCOBALAMIN 1000 MCG: 1000 INJECTION, SOLUTION INTRAMUSCULAR; SUBCUTANEOUS at 10:13

## 2021-04-28 ENCOUNTER — TELEPHONE (OUTPATIENT)
Dept: INTERNAL MEDICINE | Facility: CLINIC | Age: 81
End: 2021-04-28

## 2021-04-28 ENCOUNTER — OFFICE VISIT (OUTPATIENT)
Dept: INTERNAL MEDICINE | Facility: CLINIC | Age: 81
End: 2021-04-28

## 2021-04-28 VITALS — BODY MASS INDEX: 27.49 KG/M2 | WEIGHT: 161 LBS | HEIGHT: 64 IN | TEMPERATURE: 97.3 F

## 2021-04-28 DIAGNOSIS — R13.10 DYSPHAGIA, UNSPECIFIED TYPE: ICD-10-CM

## 2021-04-28 DIAGNOSIS — I48.0 PAROXYSMAL ATRIAL FIBRILLATION (HCC): Primary | ICD-10-CM

## 2021-04-28 DIAGNOSIS — E27.40 ADRENAL INSUFFICIENCY (HCC): ICD-10-CM

## 2021-04-28 PROCEDURE — 99214 OFFICE O/P EST MOD 30 MIN: CPT | Performed by: INTERNAL MEDICINE

## 2021-04-28 RX ORDER — PREDNISONE 10 MG/1
10 TABLET ORAL DAILY
Qty: 90 TABLET | Refills: 1 | Status: SHIPPED | OUTPATIENT
Start: 2021-04-28 | End: 2021-05-07 | Stop reason: SDUPTHER

## 2021-04-28 RX ORDER — PREDNISONE 1 MG/1
5 TABLET ORAL DAILY
Qty: 90 TABLET | Refills: 1 | Status: SHIPPED | OUTPATIENT
Start: 2021-04-28 | End: 2021-11-08 | Stop reason: SDUPTHER

## 2021-04-28 NOTE — TELEPHONE ENCOUNTER
HA, PATIENT'S DAUGHTER, IS REQUESTING A CALL BACK REGARDING THE PATIENT'S VISIT TODAY.     HA CAN BE REACHED AT  [phone]

## 2021-04-28 NOTE — PROGRESS NOTES
"Chief Complaint  No chief complaint on file.    Subjective          Margaret Wilson presents to [facility]  History of Present Illness  Here for f/u of the increse in prednisone.  She is feeling better- more steady on her feet, more energy.   says memory loss still greatest issue. Saw neuro NP- add B12, ordered neuropsych testing but was never contacted.   Her appetite is good  Plans to see Dr. Maldonado so he can put her on warfarin and she can have it checked with her .  We did discuss the risk of CVA with her being off of it.   Choking when she eats- has strted eating much smaller meals.  She does not eat much at a seating now.     Objective   Vital Signs:   Temp 97.3 °F (36.3 °C)   Ht 162.6 cm (64.02\")   Wt 73 kg (161 lb)   BMI 27.62 kg/m²     Physical Exam  Constitutional:       General: She is not in acute distress.  Cardiovascular:      Rate and Rhythm: Normal rate. Rhythm irregular.   Pulmonary:      Effort: Pulmonary effort is normal.   Musculoskeletal:      Right lower leg: No edema.      Left lower leg: No edema.        Result Review :   The following data was reviewed by: Juliane Dye MD on 04/28/2021:    Data reviewed: Consultant notes neurology          Assessment and Plan    Diagnoses and all orders for this visit:    1. Paroxysmal atrial fibrillation (CMS/HCC) (Primary)  Comments:  agree with seeing Dr. Maldonado  Orders:  -     Ambulatory Referral to Cardiology    2. Adrenal insufficiency (CMS/HCC)  Comments:  need to wean back down to 5-10 mg prednisone, will go to 15 mg now.   Orders:  -     predniSONE (DELTASONE) 10 MG tablet; Take 1 tablet by mouth Daily.  Dispense: 90 tablet; Refill: 1    3. Dysphagia, unspecified type  Comments:  check esophagram.  Orders:  -     FL Esophagram Complete; Future        Follow Up   Return in about 3 months (around 7/28/2021) for Recheck.  Patient was given instructions and counseling regarding her " condition or for health maintenance advice. Please see specific information pulled into the AVS if appropriate.

## 2021-04-29 ENCOUNTER — PROCEDURE VISIT (OUTPATIENT)
Dept: NEUROLOGY | Facility: CLINIC | Age: 81
End: 2021-04-29

## 2021-04-29 DIAGNOSIS — E53.8 B12 DEFICIENCY: Primary | ICD-10-CM

## 2021-04-29 PROCEDURE — 96372 THER/PROPH/DIAG INJ SC/IM: CPT | Performed by: NURSE PRACTITIONER

## 2021-04-29 RX ORDER — CYANOCOBALAMIN 1000 UG/ML
1000 INJECTION, SOLUTION INTRAMUSCULAR; SUBCUTANEOUS WEEKLY
Status: DISCONTINUED | OUTPATIENT
Start: 2021-04-29 | End: 2021-05-27

## 2021-04-29 RX ADMIN — CYANOCOBALAMIN 1000 MCG: 1000 INJECTION, SOLUTION INTRAMUSCULAR; SUBCUTANEOUS at 09:51

## 2021-05-06 ENCOUNTER — OFFICE VISIT (OUTPATIENT)
Dept: CARDIOLOGY | Facility: CLINIC | Age: 81
End: 2021-05-06

## 2021-05-06 ENCOUNTER — TELEPHONE (OUTPATIENT)
Dept: CARDIOLOGY | Facility: CLINIC | Age: 81
End: 2021-05-06

## 2021-05-06 VITALS
RESPIRATION RATE: 18 BRPM | WEIGHT: 173 LBS | HEIGHT: 64 IN | DIASTOLIC BLOOD PRESSURE: 104 MMHG | HEART RATE: 67 BPM | BODY MASS INDEX: 29.53 KG/M2 | SYSTOLIC BLOOD PRESSURE: 164 MMHG | OXYGEN SATURATION: 98 %

## 2021-05-06 DIAGNOSIS — I51.9 LV DYSFUNCTION: Chronic | ICD-10-CM

## 2021-05-06 DIAGNOSIS — Z98.890 HISTORY OF MITRAL VALVE REPAIR: Chronic | ICD-10-CM

## 2021-05-06 DIAGNOSIS — I48.0 PAROXYSMAL ATRIAL FIBRILLATION (HCC): Primary | ICD-10-CM

## 2021-05-06 DIAGNOSIS — Z98.890 HISTORY OF TRICUSPID VALVE REPAIR: Chronic | ICD-10-CM

## 2021-05-06 PROBLEM — I38 VALVULAR HEART DISEASE: Chronic | Status: ACTIVE | Noted: 2019-04-30

## 2021-05-06 PROBLEM — I10 ESSENTIAL HYPERTENSION: Chronic | Status: ACTIVE | Noted: 2019-04-30

## 2021-05-06 PROBLEM — I48.91 ATRIAL FIBRILLATION: Chronic | Status: ACTIVE | Noted: 2020-10-22

## 2021-05-06 PROCEDURE — 99214 OFFICE O/P EST MOD 30 MIN: CPT | Performed by: INTERNAL MEDICINE

## 2021-05-06 RX ORDER — WARFARIN SODIUM 5 MG/1
5 TABLET ORAL NIGHTLY
Qty: 30 TABLET | Refills: 0 | Status: SHIPPED | OUTPATIENT
Start: 2021-05-06 | End: 2021-10-28 | Stop reason: SDUPTHER

## 2021-05-06 RX ORDER — WARFARIN SODIUM 5 MG/1
5 TABLET ORAL NIGHTLY
Qty: 90 TABLET | Refills: 1 | Status: SHIPPED | OUTPATIENT
Start: 2021-05-06 | End: 2021-11-29 | Stop reason: SDUPTHER

## 2021-05-06 NOTE — PROGRESS NOTES
"Chief Complaint  Atrial Fibrillation    Subjective    History of Present Illness    I saw Margaret in the office today.  She has a history of mitral and tricuspid valve repair in 2011.  Cardiac catheterization at that time revealed normal coronary arteries.  She has a history of atrial fibrillation in the past.  She was on anticoagulation but developed significant GI bleed and therefore her anticoagulation was discontinued in 2012.  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  She also has a history of essential hypertension, thyroid disorder, GERD, cognitive impairment, COPD and prior history of BOOP.  She had a prior history of sleep apnea but states that her last sleep study was negative.  She now has persistent atrial fibrillation.  She was initially placed on apixaban but this has been cost prohibitive.    Margaret is in the office today with her  to discuss warfarin therapy. The FVB1WD6 score is 4.0, which represents a 4% yearly risk of stroke.  With her elevated score, anticoagulation is definitely recommended.  I discussed warfarin with her at length.  I discussed the risk and benefits and possible drug interference as well as different foods interference.  We have discussed monitoring her INR and the importance of follow-up and compliance.  She is not experiencing any symptoms to suggest GI bleed.  She is not predisposed to falls.  She does monitor her blood pressure at home and today's reading is not her normal at home.  Her  does confirm all of the above.  She is willing to initiate anticoagulation.  She does admit that her memory is poor but her  was actively engaged in a conversation      Objective   Vital Signs:   BP (!) 164/104 (BP Location: Left arm, Patient Position: Sitting, Cuff Size: Large Adult)   Pulse 67   Resp 18   Ht 162.6 cm (64\")   Wt 78.5 kg (173 lb)   SpO2 98%   BMI 29.70 kg/m²     Vitals reviewed. "   Constitutional:       Appearance: Healthy appearance. Not in distress.   Neck:      Vascular: No JVR. JVD normal.   Pulmonary:      Effort: Pulmonary effort is normal.      Breath sounds: Normal breath sounds. No wheezing. No rhonchi. No rales.   Chest:      Chest wall: Not tender to palpatation.      Comments: Healed surgical scar  Cardiovascular:      PMI at left midclavicular line. Normal rate. Irregularly irregular rhythm. Normal S1. Normal S2.      Murmurs: There is a systolic murmur.  There is a 1/6 systolic murmur heard at the left lower sternal border      No gallop. No click. No rub.   Pulses:     Intact distal pulses.   Abdominal:      General: Bowel sounds are normal.      Palpations: Abdomen is soft.      Tenderness: There is no abdominal tenderness.   Musculoskeletal: Normal range of motion.         General: No tenderness.      Cervical back: Normal range of motion and neck supple. Skin:     General: Skin is warm and dry.   Neurological:      General: No focal deficit present.      Mental Status: Alert and oriented to person, place and time. Exhibits a cognitive deficit.      Gait: Gait is intact.            Result Review :   The following data was reviewed by: Stephany Lizama MD on 05/06/2021:  CMP    CMP 10/24/20 10/25/20 3/31/21   Glucose 91 88    Glucose   79   BUN 13 9 12   Creatinine 0.78 0.54 (A) 0.81   eGFR Non  Am 71 108 68   eGFR African Am   82   Sodium 142 140 143   Potassium 3.9 3.5 4.4   Chloride 106 107 106   Calcium 8.8 8.7 9.5   Total Protein   6.4   Albumin  3.50 4.20   Globulin   2.2   Total Bilirubin   2.0 (A)   Alkaline Phosphatase   95   AST (SGOT)   23   ALT (SGPT)   20   (A) Abnormal value       Comments are available for some flowsheets but are not being displayed.           CBC w/diff    CBC w/Diff 10/24/20 10/25/20 3/31/21   WBC 8.72 7.54 7.19   RBC 4.33 4.20 4.46   Hemoglobin 12.9 12.7 13.8   Hematocrit 40.3 38.8 42.7   MCV 93.1 92.4 95.7   MCH 29.8 30.2 30.9   MCHC  32.0 32.7 32.3   RDW 13.0 13.0 12.9   Platelets 149 134 (A) 173   Neutrophil Rel % 65.5 59.8 68.6   Immature Granulocyte Rel % 0.2     Lymphocyte Rel % 21.1 25.7 18.9 (A)   Monocyte Rel % 9.7 9.2 9.6   Eosinophil Rel % 3.0 4.6 1.7   Basophil Rel % 0.5 0.4 0.8   (A) Abnormal value                TSH    TSH 10/25/20 11/2/20 3/31/21   TSH <0.005 (A) 0.006 (A) 0.016 (A)   (A) Abnormal value                      Assessment and Plan    1.  Persistent atrial fibrillation (CMS/HCC)  She has an elevated risk for stroke given her persistent atrial fibrillation.  She is willing to initiate Coumadin.  I am going to start her on 10 mg today, 10 mg tomorrow then 5 mg the next 2 days and she will check an INR the following day.  I have also written this out on paper for both her and her .  Her INR goal is 2-3.  - warfarin (COUMADIN) 5 MG tablet; Take 1 tablet by mouth Every Night.  Dispense: 30 tablet; Refill: 0  - warfarin (COUMADIN) 5 MG tablet; Take 1 tablet by mouth Every Night.  Dispense: 90 tablet; Refill: 1    2. History of mitral valve repair  Stable    3. History of tricuspid valve repair  Stable    4. LV dysfunction  No symptoms of CHF        Follow Up   No follow-ups on file.  Patient was given instructions and counseling regarding her condition or for health maintenance advice. Please see specific information pulled into the AVS if appropriate.        gradual onset

## 2021-05-06 NOTE — TELEPHONE ENCOUNTER
CPA pt    Patient would like to know if there was any medication Dr. Lizama wanted her to discontinue.

## 2021-05-07 DIAGNOSIS — E27.40 ADRENAL INSUFFICIENCY (HCC): ICD-10-CM

## 2021-05-10 ENCOUNTER — CLINICAL SUPPORT (OUTPATIENT)
Dept: CARDIOLOGY | Facility: CLINIC | Age: 81
End: 2021-05-10

## 2021-05-10 ENCOUNTER — TELEPHONE (OUTPATIENT)
Dept: INTERNAL MEDICINE | Facility: CLINIC | Age: 81
End: 2021-05-10

## 2021-05-10 DIAGNOSIS — I48.19 PERSISTENT ATRIAL FIBRILLATION (HCC): Primary | ICD-10-CM

## 2021-05-10 LAB — INR PPP: 3

## 2021-05-10 PROCEDURE — 85610 PROTHROMBIN TIME: CPT | Performed by: INTERNAL MEDICINE

## 2021-05-10 PROCEDURE — 36416 COLLJ CAPILLARY BLOOD SPEC: CPT | Performed by: INTERNAL MEDICINE

## 2021-05-10 RX ORDER — PREDNISONE 10 MG/1
10 TABLET ORAL DAILY
Qty: 90 TABLET | Refills: 1 | Status: SHIPPED | OUTPATIENT
Start: 2021-05-10 | End: 2021-07-27 | Stop reason: SDUPTHER

## 2021-05-10 NOTE — TELEPHONE ENCOUNTER
PATIENT IS CONFUSED AS TO HOW MANY MG SHE IS SUPPOSED TO TAKING OF HER PREDNISONE.    PLEASE ADVISE  [phone]

## 2021-05-11 ENCOUNTER — TRANSCRIBE ORDERS (OUTPATIENT)
Dept: SLEEP MEDICINE | Facility: HOSPITAL | Age: 81
End: 2021-05-11

## 2021-05-11 ENCOUNTER — TELEPHONE (OUTPATIENT)
Dept: INTERNAL MEDICINE | Facility: CLINIC | Age: 81
End: 2021-05-11

## 2021-05-11 DIAGNOSIS — Z01.818 OTHER SPECIFIED PRE-OPERATIVE EXAMINATION: Primary | ICD-10-CM

## 2021-05-11 NOTE — TELEPHONE ENCOUNTER
Caller: Margaret Wilson    Relationship: Self    Best call back number: [phone]    What is the best time to reach you: ANY    Who are you requesting to speak with (clinical staff, provider,  specific staff member): DR NAVARRO    What was the call regarding: PATIENT HAS QUESTIONS REGARDING HER PREDNISONE.     Do you require a callback: YES

## 2021-05-13 ENCOUNTER — PROCEDURE VISIT (OUTPATIENT)
Dept: NEUROLOGY | Facility: CLINIC | Age: 81
End: 2021-05-13

## 2021-05-13 DIAGNOSIS — E53.8 B12 DEFICIENCY: Primary | ICD-10-CM

## 2021-05-13 PROCEDURE — 96372 THER/PROPH/DIAG INJ SC/IM: CPT | Performed by: NURSE PRACTITIONER

## 2021-05-13 RX ORDER — CYANOCOBALAMIN 1000 UG/ML
1000 INJECTION, SOLUTION INTRAMUSCULAR; SUBCUTANEOUS ONCE
Status: COMPLETED | OUTPATIENT
Start: 2021-05-13 | End: 2021-05-13

## 2021-05-13 RX ADMIN — CYANOCOBALAMIN 1000 MCG: 1000 INJECTION, SOLUTION INTRAMUSCULAR; SUBCUTANEOUS at 10:26

## 2021-05-17 ENCOUNTER — CLINICAL SUPPORT (OUTPATIENT)
Dept: CARDIOLOGY | Facility: CLINIC | Age: 81
End: 2021-05-17

## 2021-05-17 DIAGNOSIS — I48.91 ATRIAL FIBRILLATION, UNSPECIFIED TYPE (HCC): Primary | ICD-10-CM

## 2021-05-17 LAB — INR PPP: 4.3 (ref 2–3)

## 2021-05-17 PROCEDURE — 36416 COLLJ CAPILLARY BLOOD SPEC: CPT | Performed by: INTERNAL MEDICINE

## 2021-05-17 PROCEDURE — 85610 PROTHROMBIN TIME: CPT | Performed by: INTERNAL MEDICINE

## 2021-05-21 ENCOUNTER — TRANSCRIBE ORDERS (OUTPATIENT)
Dept: SLEEP MEDICINE | Facility: HOSPITAL | Age: 81
End: 2021-05-21

## 2021-05-21 ENCOUNTER — CLINICAL SUPPORT (OUTPATIENT)
Dept: CARDIOLOGY | Facility: CLINIC | Age: 81
End: 2021-05-21

## 2021-05-21 DIAGNOSIS — I48.0 PAROXYSMAL ATRIAL FIBRILLATION (HCC): Primary | ICD-10-CM

## 2021-05-21 DIAGNOSIS — Z01.818 OTHER SPECIFIED PRE-OPERATIVE EXAMINATION: Primary | ICD-10-CM

## 2021-05-21 LAB — INR PPP: 1.2

## 2021-05-21 PROCEDURE — 85610 PROTHROMBIN TIME: CPT | Performed by: INTERNAL MEDICINE

## 2021-05-21 PROCEDURE — 36416 COLLJ CAPILLARY BLOOD SPEC: CPT | Performed by: INTERNAL MEDICINE

## 2021-05-24 ENCOUNTER — ANTICOAGULATION VISIT (OUTPATIENT)
Dept: CARDIOLOGY | Facility: CLINIC | Age: 81
End: 2021-05-24

## 2021-05-24 DIAGNOSIS — I48.0 PAROXYSMAL ATRIAL FIBRILLATION (HCC): Primary | ICD-10-CM

## 2021-05-24 LAB — INR PPP: 1.9 (ref 0.9–1.1)

## 2021-05-24 PROCEDURE — 85610 PROTHROMBIN TIME: CPT | Performed by: INTERNAL MEDICINE

## 2021-05-24 PROCEDURE — 36416 COLLJ CAPILLARY BLOOD SPEC: CPT | Performed by: INTERNAL MEDICINE

## 2021-05-24 RX ORDER — CITALOPRAM 20 MG/1
20 TABLET ORAL DAILY
Qty: 90 TABLET | Refills: 1 | Status: SHIPPED | OUTPATIENT
Start: 2021-05-24 | End: 2021-07-27

## 2021-05-27 ENCOUNTER — PROCEDURE VISIT (OUTPATIENT)
Dept: NEUROLOGY | Facility: CLINIC | Age: 81
End: 2021-05-27

## 2021-05-27 DIAGNOSIS — E53.8 B12 DEFICIENCY: Primary | ICD-10-CM

## 2021-05-27 PROCEDURE — 96372 THER/PROPH/DIAG INJ SC/IM: CPT | Performed by: NURSE PRACTITIONER

## 2021-05-27 RX ORDER — CYANOCOBALAMIN 1000 UG/ML
1000 INJECTION, SOLUTION INTRAMUSCULAR; SUBCUTANEOUS WEEKLY
Status: DISCONTINUED | OUTPATIENT
Start: 2021-05-27 | End: 2021-06-24

## 2021-05-27 RX ADMIN — CYANOCOBALAMIN 1000 MCG: 1000 INJECTION, SOLUTION INTRAMUSCULAR; SUBCUTANEOUS at 10:04

## 2021-05-29 ENCOUNTER — LAB (OUTPATIENT)
Dept: LAB | Facility: HOSPITAL | Age: 81
End: 2021-05-29

## 2021-05-29 DIAGNOSIS — Z01.818 OTHER SPECIFIED PRE-OPERATIVE EXAMINATION: ICD-10-CM

## 2021-05-29 LAB — SARS-COV-2 ORF1AB RESP QL NAA+PROBE: NOT DETECTED

## 2021-05-29 PROCEDURE — U0005 INFEC AGEN DETEC AMPLI PROBE: HCPCS

## 2021-05-29 PROCEDURE — C9803 HOPD COVID-19 SPEC COLLECT: HCPCS

## 2021-05-29 PROCEDURE — U0004 COV-19 TEST NON-CDC HGH THRU: HCPCS

## 2021-06-01 ENCOUNTER — HOSPITAL ENCOUNTER (OUTPATIENT)
Dept: GENERAL RADIOLOGY | Facility: HOSPITAL | Age: 81
Discharge: HOME | End: 2021-06-01
Admitting: INTERNAL MEDICINE

## 2021-06-01 ENCOUNTER — ANTICOAGULATION VISIT (OUTPATIENT)
Dept: CARDIOLOGY | Facility: CLINIC | Age: 81
End: 2021-06-01

## 2021-06-01 DIAGNOSIS — R13.10 DYSPHAGIA, UNSPECIFIED TYPE: ICD-10-CM

## 2021-06-01 DIAGNOSIS — I48.11 LONGSTANDING PERSISTENT ATRIAL FIBRILLATION (HCC): Primary | ICD-10-CM

## 2021-06-01 LAB — INR PPP: 4.7 (ref 0.9–1.1)

## 2021-06-01 PROCEDURE — A9270 NON-COVERED ITEM OR SERVICE: HCPCS | Performed by: INTERNAL MEDICINE

## 2021-06-01 PROCEDURE — 36416 COLLJ CAPILLARY BLOOD SPEC: CPT | Performed by: INTERNAL MEDICINE

## 2021-06-01 PROCEDURE — 63710000001 BARIUM SULFATE 96 % RECONSTITUTED SUSPENSION: Performed by: INTERNAL MEDICINE

## 2021-06-01 PROCEDURE — 63710000001 SOD BICARB-CITRIC ACID-SIMETHICONE 2.21-1.53-0.04 G PACK: Performed by: INTERNAL MEDICINE

## 2021-06-01 PROCEDURE — 63710000001 BARIUM SULFATE 98 % RECONSTITUTED SUSPENSION: Performed by: INTERNAL MEDICINE

## 2021-06-01 PROCEDURE — 74221 X-RAY XM ESOPHAGUS 2CNTRST: CPT

## 2021-06-01 PROCEDURE — 85610 PROTHROMBIN TIME: CPT | Performed by: INTERNAL MEDICINE

## 2021-06-01 RX ADMIN — ANTACID/ANTIFLATULENT 1 TABLET: 380; 550; 10; 10 GRANULE, EFFERVESCENT ORAL at 10:30

## 2021-06-01 RX ADMIN — BARIUM SULFATE 135 ML: 980 POWDER, FOR SUSPENSION ORAL at 10:30

## 2021-06-01 RX ADMIN — BARIUM SULFATE 183 ML: 960 POWDER, FOR SUSPENSION ORAL at 10:30

## 2021-06-04 ENCOUNTER — CLINICAL SUPPORT (OUTPATIENT)
Dept: CARDIOLOGY | Facility: CLINIC | Age: 81
End: 2021-06-04

## 2021-06-04 DIAGNOSIS — I48.0 PAROXYSMAL ATRIAL FIBRILLATION (HCC): Primary | ICD-10-CM

## 2021-06-04 LAB — INR PPP: 1.6

## 2021-06-04 PROCEDURE — 36416 COLLJ CAPILLARY BLOOD SPEC: CPT | Performed by: INTERNAL MEDICINE

## 2021-06-04 PROCEDURE — 85610 PROTHROMBIN TIME: CPT | Performed by: INTERNAL MEDICINE

## 2021-06-10 ENCOUNTER — ANTICOAGULATION VISIT (OUTPATIENT)
Dept: CARDIOLOGY | Facility: CLINIC | Age: 81
End: 2021-06-10

## 2021-06-10 ENCOUNTER — PROCEDURE VISIT (OUTPATIENT)
Dept: NEUROLOGY | Facility: CLINIC | Age: 81
End: 2021-06-10

## 2021-06-10 DIAGNOSIS — E53.8 B12 DEFICIENCY: Primary | ICD-10-CM

## 2021-06-10 DIAGNOSIS — I48.11 LONGSTANDING PERSISTENT ATRIAL FIBRILLATION (HCC): Primary | ICD-10-CM

## 2021-06-10 LAB — INR PPP: 3.2 (ref 0.9–1.1)

## 2021-06-10 PROCEDURE — 96372 THER/PROPH/DIAG INJ SC/IM: CPT | Performed by: NURSE PRACTITIONER

## 2021-06-10 PROCEDURE — 36416 COLLJ CAPILLARY BLOOD SPEC: CPT | Performed by: INTERNAL MEDICINE

## 2021-06-10 PROCEDURE — 85610 PROTHROMBIN TIME: CPT | Performed by: INTERNAL MEDICINE

## 2021-06-10 RX ORDER — CYANOCOBALAMIN 1000 UG/ML
1000 INJECTION, SOLUTION INTRAMUSCULAR; SUBCUTANEOUS WEEKLY
Status: DISCONTINUED | OUTPATIENT
Start: 2021-06-10 | End: 2021-06-24

## 2021-06-10 RX ADMIN — CYANOCOBALAMIN 1000 MCG: 1000 INJECTION, SOLUTION INTRAMUSCULAR; SUBCUTANEOUS at 10:36

## 2021-06-17 ENCOUNTER — CLINICAL SUPPORT (OUTPATIENT)
Dept: CARDIOLOGY | Facility: CLINIC | Age: 81
End: 2021-06-17

## 2021-06-17 DIAGNOSIS — I48.11 LONGSTANDING PERSISTENT ATRIAL FIBRILLATION (HCC): Primary | ICD-10-CM

## 2021-06-17 LAB — INR PPP: 3 (ref 0.9–1.1)

## 2021-06-17 PROCEDURE — 85610 PROTHROMBIN TIME: CPT | Performed by: INTERNAL MEDICINE

## 2021-06-17 PROCEDURE — 36416 COLLJ CAPILLARY BLOOD SPEC: CPT | Performed by: INTERNAL MEDICINE

## 2021-06-24 ENCOUNTER — ANTICOAGULATION VISIT (OUTPATIENT)
Dept: CARDIOLOGY | Facility: CLINIC | Age: 81
End: 2021-06-24

## 2021-06-24 ENCOUNTER — PROCEDURE VISIT (OUTPATIENT)
Dept: NEUROLOGY | Facility: CLINIC | Age: 81
End: 2021-06-24

## 2021-06-24 DIAGNOSIS — E53.8 B12 DEFICIENCY: Primary | ICD-10-CM

## 2021-06-24 DIAGNOSIS — I48.11 LONGSTANDING PERSISTENT ATRIAL FIBRILLATION (HCC): Primary | ICD-10-CM

## 2021-06-24 LAB — INR PPP: 3.6 (ref 0.9–1.1)

## 2021-06-24 PROCEDURE — 85610 PROTHROMBIN TIME: CPT | Performed by: INTERNAL MEDICINE

## 2021-06-24 PROCEDURE — 96372 THER/PROPH/DIAG INJ SC/IM: CPT | Performed by: NURSE PRACTITIONER

## 2021-06-24 PROCEDURE — 36416 COLLJ CAPILLARY BLOOD SPEC: CPT | Performed by: INTERNAL MEDICINE

## 2021-06-24 RX ORDER — CYANOCOBALAMIN 1000 UG/ML
1000 INJECTION, SOLUTION INTRAMUSCULAR; SUBCUTANEOUS
Status: DISCONTINUED | OUTPATIENT
Start: 2021-06-24 | End: 2021-07-08

## 2021-06-24 RX ADMIN — CYANOCOBALAMIN 1000 MCG: 1000 INJECTION, SOLUTION INTRAMUSCULAR; SUBCUTANEOUS at 10:45

## 2021-07-01 ENCOUNTER — CLINICAL SUPPORT (OUTPATIENT)
Dept: CARDIOLOGY | Facility: CLINIC | Age: 81
End: 2021-07-01

## 2021-07-01 DIAGNOSIS — I48.11 LONGSTANDING PERSISTENT ATRIAL FIBRILLATION (HCC): Primary | ICD-10-CM

## 2021-07-01 LAB — INR PPP: 3 (ref 0.9–1.1)

## 2021-07-01 PROCEDURE — 85610 PROTHROMBIN TIME: CPT | Performed by: INTERNAL MEDICINE

## 2021-07-01 PROCEDURE — 36416 COLLJ CAPILLARY BLOOD SPEC: CPT | Performed by: INTERNAL MEDICINE

## 2021-07-08 ENCOUNTER — CLINICAL SUPPORT (OUTPATIENT)
Dept: CARDIOLOGY | Facility: CLINIC | Age: 81
End: 2021-07-08

## 2021-07-08 ENCOUNTER — PROCEDURE VISIT (OUTPATIENT)
Dept: NEUROLOGY | Facility: CLINIC | Age: 81
End: 2021-07-08

## 2021-07-08 ENCOUNTER — ANTICOAGULATION VISIT (OUTPATIENT)
Dept: CARDIOLOGY | Facility: CLINIC | Age: 81
End: 2021-07-08

## 2021-07-08 DIAGNOSIS — I48.11 LONGSTANDING PERSISTENT ATRIAL FIBRILLATION (HCC): Primary | ICD-10-CM

## 2021-07-08 DIAGNOSIS — E53.8 B12 DEFICIENCY: Primary | ICD-10-CM

## 2021-07-08 LAB
INR PPP: 4.3 (ref 0.9–1.1)
INR PPP: 4.3 (ref 0.9–1.1)

## 2021-07-08 PROCEDURE — 36416 COLLJ CAPILLARY BLOOD SPEC: CPT | Performed by: INTERNAL MEDICINE

## 2021-07-08 PROCEDURE — 96372 THER/PROPH/DIAG INJ SC/IM: CPT | Performed by: NURSE PRACTITIONER

## 2021-07-08 PROCEDURE — 85610 PROTHROMBIN TIME: CPT | Performed by: INTERNAL MEDICINE

## 2021-07-08 RX ORDER — CYANOCOBALAMIN 1000 UG/ML
1000 INJECTION, SOLUTION INTRAMUSCULAR; SUBCUTANEOUS
Status: DISCONTINUED | OUTPATIENT
Start: 2021-07-08 | End: 2021-08-05

## 2021-07-08 RX ADMIN — CYANOCOBALAMIN 1000 MCG: 1000 INJECTION, SOLUTION INTRAMUSCULAR; SUBCUTANEOUS at 11:05

## 2021-07-13 ENCOUNTER — OFFICE VISIT (OUTPATIENT)
Dept: NEUROLOGY | Facility: CLINIC | Age: 81
End: 2021-07-13

## 2021-07-13 VITALS
HEART RATE: 80 BPM | SYSTOLIC BLOOD PRESSURE: 132 MMHG | HEIGHT: 64 IN | DIASTOLIC BLOOD PRESSURE: 80 MMHG | WEIGHT: 175 LBS | BODY MASS INDEX: 29.88 KG/M2

## 2021-07-13 DIAGNOSIS — G89.29 CHRONIC NONINTRACTABLE HEADACHE, UNSPECIFIED HEADACHE TYPE: ICD-10-CM

## 2021-07-13 DIAGNOSIS — R51.9 CHRONIC NONINTRACTABLE HEADACHE, UNSPECIFIED HEADACHE TYPE: ICD-10-CM

## 2021-07-13 DIAGNOSIS — E53.8 B12 DEFICIENCY: ICD-10-CM

## 2021-07-13 DIAGNOSIS — R41.89 COGNITIVE IMPAIRMENT: Primary | ICD-10-CM

## 2021-07-13 DIAGNOSIS — R26.89 BALANCE PROBLEM: ICD-10-CM

## 2021-07-13 DIAGNOSIS — F32.A DEPRESSION, UNSPECIFIED DEPRESSION TYPE: ICD-10-CM

## 2021-07-13 DIAGNOSIS — R42 DIZZINESS: ICD-10-CM

## 2021-07-13 DIAGNOSIS — G47.33 OBSTRUCTIVE SLEEP APNEA: ICD-10-CM

## 2021-07-13 PROCEDURE — 99215 OFFICE O/P EST HI 40 MIN: CPT | Performed by: NURSE PRACTITIONER

## 2021-07-13 RX ORDER — THIAMINE HCL 100 MG
500 TABLET ORAL DAILY
Qty: 30 TABLET | Refills: 3 | Status: SHIPPED | OUTPATIENT
Start: 2021-07-13 | End: 2022-11-13

## 2021-07-13 NOTE — PROGRESS NOTES
DOS: 2021  NAME: Margaret Wilson   : 1940  PCP: Juliane Dye MD    Chief Complaint   Patient presents with   • Headache      SUBJECTIVE  Neurological Problem:  80 y.o. RHW female with headaches, dizziness, balance issues, memory complaints, PAF (on warfarin), adrenal insufficiency (followed by Dr. 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 She presents today to f/u for multiple neurologic complaints including memory loss, headache and balance problems.  She is accompanied by her spouse.    Interval History:   **For previous history, please see progress note dated 2019.    Ms. Freitas was initially evaluated in Dec. 2019 for headaches and longstanding c/o dizziness (see extensive note on  for details). At that time her neurologic exam was essentially unremarkable except for unsteady gait and MRI brain unrevealing. Chronic dizziness and imbalance was thought to be multifactorial due to BPPV, orthostatic hypotension and also with lumbar issues contributing to gait dysfunction. A CTA h/n showed moderate small vessel disease, no acute findings. CTA neck notable for some multilevel cervical spondylosis, minimal left ICA stenosis read at 20%; Intracranially, arteries were unremarkable, including posterior circulation, no concerns for vestibular insufficiency. In regards to headaches at that time, she felt they were well controlled, did not want any pharmacologic treatment.      She was seen in f/u in 2020, was getting PT and vestibular rehab, doing much better with balance. Headaches were well controlled at that time. She had ongoing c/o memory loss and at that time was being treated with aricept 10 mg as well as Celexa for depression which was thought to be a contributing factor.     She was last seen in 2021, with major concern being her memory loss, [place] at that time was 18/30, majority of  "points lost on recall and executive functioning, I recommended at the time that she continue Aricept and she was referred for neuropsych evaluation  Also with her B12 being low normal she was started on B12 replacement protocol.    She presents today along with her spouse, continues to have multiple issues including memory loss, headaches, balance problems.    MEMORY Loss- This again continues to be one of her main concerns, she gets tearful when speaking about it, apparently the patient and spouse say she is not currently on Aricept, cannot really indicate why this was previously prescribed by her PCP.  No significant change in her symptoms which are documented in previous note in April when she was referred for neuropsych testing which she is scheduled for next Thursday.       HEADACHES -She has been treating headaches for the most part with Tylenol as needed.  We discussed pharmacologic treatment with preventive medications in the past which she has declined.  She tells me today she has nearly daily headaches.  She is compliant with her CPAP.  She denies any focal symptoms, no vision changes, unilateral weakness, numbness/tingling.  Headaches are generally frontal, sometimes radiate to superiorly, retro-orbital.  Pressure, throbbing.  Sometimes can be sensitive to light and sound.  Prefers to lay down, take Tylenol.  She has failed gabapentin in the past.    BALANCE Problems: Continues with some balance issues, is currently using a cane, denies any recent falls.  Had a round of PT last year, she has symptoms of benign positional vertigo, good results with vestibular therapy in the past.  She does continue to complain of some \"head fullness\" and spinning with transitions that she thinks is contributing to her headaches.     She denies any other changes in her health since her last visit.  Review of lab work from March 2021 shows a unremarkable CMP, TSH 0.016, B12 376.  She does not smoke.  Occasional alcohol.  No " recent falls.    Review of Systems:Review of Systems   Eyes: Positive for photophobia. Negative for visual disturbance.   Respiratory: Negative for chest tightness and shortness of breath.    Cardiovascular: Negative for chest pain.   Gastrointestinal: Positive for nausea. Negative for vomiting.   Neurological: Positive for headaches.   Psychiatric/Behavioral: Positive for confusion. The patient is nervous/anxious.     Above ROS reviewed    The following portions of the patient's history were reviewed and updated as appropriate: allergies, current medications, past family history, past medical history, past social history, past surgical history and problem list.    Current Medications:   Current Outpatient Medications:   •  citalopram (CeleXA) 20 MG tablet, Take 1 tablet by mouth Daily., Disp: 90 tablet, Rfl: 1  •  furosemide (LASIX) 20 MG tablet, TAKE 1 TABLET DAILY, Disp: 90 tablet, Rfl: 3  •  KLOR-CON 10 MEQ CR tablet, TAKE 1 TABLET DAILY, Disp: 90 tablet, Rfl: 3  •  metoprolol succinate XL (TOPROL-XL) 100 MG 24 hr tablet, Take 100 mg by mouth Daily., Disp: , Rfl:   •  montelukast (SINGULAIR) 10 MG tablet, TAKE 1 TABLET EVERY NIGHT, Disp: 90 tablet, Rfl: 3  •  predniSONE (DELTASONE) 10 MG tablet, Take 1 tablet by mouth Daily., Disp: 90 tablet, Rfl: 1  •  predniSONE (DELTASONE) 5 MG tablet, Take 1 tablet by mouth Daily. To take with the 10 mg tablet, Disp: 90 tablet, Rfl: 1  •  VITAMIN D PO, Take 1,500 mg by mouth Daily., Disp: , Rfl:   •  warfarin (COUMADIN) 5 MG tablet, Take 1 tablet by mouth Every Night., Disp: 30 tablet, Rfl: 0  •  warfarin (COUMADIN) 5 MG tablet, Take 1 tablet by mouth Every Night., Disp: 90 tablet, Rfl: 1  •  donepezil (Aricept) 10 MG tablet, Take 1 tablet by mouth Every Night., Disp: 90 tablet, Rfl: 3  •  Magnesium 500 MG tablet, Take 500 mg by mouth Daily., Disp: 30 tablet, Rfl: 3  •  Riboflavin 100 MG capsule, Take 400 mg by mouth Daily for 30 days., Disp: 120 each, Rfl: 2    Current  Facility-Administered Medications:   •  cyanocobalamin injection 1,000 mcg, 1,000 mcg, Intramuscular, Q28 Days, Sena Pedersen APRN, 1,000 mcg at 07/08/21 1105  **I did not stop or change the above medications.  Patient's medication list was updated to reflect medications they have reported as currently taking, including medication changes made by other providers.    OBJECTIVE  Vitals:    07/13/21 1444   BP: 132/80   Pulse: 80     Body mass index is 30.04 kg/m².    Diagnostics:    Laboratory Results:         Lab Results   Component Value Date    WBC 7.19 03/31/2021    HGB 13.8 03/31/2021    HCT 42.7 03/31/2021    MCV 95.7 03/31/2021     03/31/2021     Lab Results   Component Value Date    GLUCOSE 88 10/25/2020    BUN 12 03/31/2021    CREATININE 0.81 03/31/2021    EGFRIFNONA 68 03/31/2021    EGFRIFAFRI 82 03/31/2021    BCR 14.8 03/31/2021    K 4.4 03/31/2021    CO2 27.3 03/31/2021    CALCIUM 9.5 03/31/2021    PROTENTOTREF 6.4 03/31/2021    ALBUMIN 4.20 03/31/2021    LABIL2 1.9 03/31/2021    AST 23 03/31/2021    ALT 20 03/31/2021     Lab Results   Component Value Date    TSH 0.016 (L) 03/31/2021     Lab Results   Component Value Date    CCVPWKBK43 376 03/31/2021       Physical Exam:  GENERAL: NAD  HEENT: Normocephalic, atraumatic   COR: RRR  Resp: Even and unlabored  Extremities: No signs of distal embolization.   Skin: No rashes, lesions or ulcers.  Psychiatric: Somewhat labile mood, cheerful at times, then tearful.     Neurological:   MS: Alert. Oriented to self, year, place. Language normal. Naming intact. No neglect. Follows all commands.  CN: II-XII grossly normal  Motor: Normal strength and tone throughout.  Sensory: Intact to light touch in arms and legs  Station and Gait: Mildly cautious, antalgic gait, utilizing cane today  Coordination: Normal finger to nose bilaterally    Impression/Plan:    1. Memory Loss -- She has neuropsych testing pending next week. Recommend she continue aricept and  Celexa as her depression/anxiety is likely contributing to her issue.  We again reviewed lifestyle modifications including regular physical activity, healthy diet, regular mental activity, social engagement.  She will continue B12 protocol regimen, will recheck levels when complete.    2.  Dizziness and balance issues -previous work-up negative for central etiology.  Symptoms likely multifactorial due to combination of BPPV, orthostatic hypotension and hip/lumbar issues contributing to her gait dysfunction.  Do recommend repeat vestibular evaluation, will refer to [facility].  We also discussed at length the importance of falls prevention.    3.  Chronic headaches - Ongoing problem, we again reviewed pharmacologic options for treatment, she agrees to magnesium and riboflavin daily for headache prevention.  For moderate to severe headaches I have given samples of Nurtec to trial, detailed instructions on use, indication, possible side effects reviewed.  If she continues with chronic headaches, could consider CGRP inhibitors monthly injectables; however, she declines any further preventive at this time.  Okay to continue Tylenol as needed, no more than 4 g in a 24-hour period.  Recommend continued compliance with CPAP, increase water intake.    Follow-up in four months    I spent a total of 40 minutes today in reviewing records, prior diagnostics, examination of patient as well as counseling and educating patient, spouse regarding diagnoses, symptoms, reviewing diagnostics with patient, pharmacologic treatment options including purpose, risk, benefits, possible side-effects, recommendations, lifestyle modifications, coordination of care and documenting plan of care.        Diagnoses and all orders for this visit:    1. Cognitive impairment (Primary)    2. Depression, unspecified depression type    3. Balance problem  -     Basic Vestibular Evaluation; Future    4. Chronic nonintractable headache, unspecified  headache type    5. Dizziness  -     Basic Vestibular Evaluation; Future    6. B12 deficiency    7. Obstructive sleep apnea    Other orders  -     Magnesium 500 MG tablet; Take 500 mg by mouth Daily.  Dispense: 30 tablet; Refill: 3  -     Riboflavin 100 MG capsule; Take 400 mg by mouth Daily for 30 days.  Dispense: 120 each; Refill: 2  -     donepezil (Aricept) 10 MG tablet; Take 1 tablet by mouth Every Night.  Dispense: 90 tablet; Refill: 3        Coding      Dictated using Dragon

## 2021-07-14 RX ORDER — DONEPEZIL HYDROCHLORIDE 10 MG/1
10 TABLET, FILM COATED ORAL NIGHTLY
Qty: 90 TABLET | Refills: 3 | Status: SHIPPED | OUTPATIENT
Start: 2021-07-14 | End: 2022-11-10 | Stop reason: ALTCHOICE

## 2021-07-19 ENCOUNTER — CLINICAL SUPPORT (OUTPATIENT)
Dept: CARDIOLOGY | Facility: CLINIC | Age: 81
End: 2021-07-19

## 2021-07-19 DIAGNOSIS — I48.11 LONGSTANDING PERSISTENT ATRIAL FIBRILLATION (HCC): Primary | ICD-10-CM

## 2021-07-19 LAB — INR PPP: 4.5

## 2021-07-23 ENCOUNTER — ANTICOAGULATION VISIT (OUTPATIENT)
Dept: CARDIOLOGY | Facility: CLINIC | Age: 81
End: 2021-07-23

## 2021-07-23 ENCOUNTER — PROCEDURE VISIT (OUTPATIENT)
Dept: NEUROLOGY | Facility: CLINIC | Age: 81
End: 2021-07-23

## 2021-07-23 DIAGNOSIS — E53.8 B12 DEFICIENCY: Primary | ICD-10-CM

## 2021-07-23 DIAGNOSIS — I48.0 PAROXYSMAL ATRIAL FIBRILLATION (HCC): Primary | ICD-10-CM

## 2021-07-23 LAB — INR PPP: 2.3

## 2021-07-23 PROCEDURE — 85610 PROTHROMBIN TIME: CPT | Performed by: INTERNAL MEDICINE

## 2021-07-23 PROCEDURE — 36416 COLLJ CAPILLARY BLOOD SPEC: CPT | Performed by: INTERNAL MEDICINE

## 2021-07-23 PROCEDURE — 96372 THER/PROPH/DIAG INJ SC/IM: CPT | Performed by: NURSE PRACTITIONER

## 2021-07-23 RX ORDER — CYANOCOBALAMIN 1000 UG/ML
1000 INJECTION, SOLUTION INTRAMUSCULAR; SUBCUTANEOUS ONCE
Status: COMPLETED | OUTPATIENT
Start: 2021-07-23 | End: 2021-07-23

## 2021-07-23 RX ADMIN — CYANOCOBALAMIN 1000 MCG: 1000 INJECTION, SOLUTION INTRAMUSCULAR; SUBCUTANEOUS at 11:22

## 2021-07-27 ENCOUNTER — OFFICE VISIT (OUTPATIENT)
Dept: INTERNAL MEDICINE | Facility: CLINIC | Age: 81
End: 2021-07-27

## 2021-07-27 VITALS — BODY MASS INDEX: 30.39 KG/M2 | HEIGHT: 64 IN | TEMPERATURE: 97.3 F | WEIGHT: 178 LBS

## 2021-07-27 DIAGNOSIS — E27.40 ADRENAL INSUFFICIENCY (HCC): ICD-10-CM

## 2021-07-27 DIAGNOSIS — G31.84 MILD COGNITIVE IMPAIRMENT: ICD-10-CM

## 2021-07-27 DIAGNOSIS — R91.8 MULTIPLE LUNG NODULES ON CT: ICD-10-CM

## 2021-07-27 DIAGNOSIS — E05.90 HYPERTHYROIDISM: Primary | ICD-10-CM

## 2021-07-27 PROCEDURE — 99214 OFFICE O/P EST MOD 30 MIN: CPT | Performed by: INTERNAL MEDICINE

## 2021-07-27 RX ORDER — DULOXETIN HYDROCHLORIDE 60 MG/1
60 CAPSULE, DELAYED RELEASE ORAL DAILY
Qty: 90 CAPSULE | Refills: 1 | Status: SHIPPED | OUTPATIENT
Start: 2021-07-27 | End: 2022-08-17

## 2021-07-27 NOTE — PROGRESS NOTES
"Chief Complaint  Fatigue, Dizziness, and Headache    Subjective          Margaret Wilson presents to [facility]  History of Present Illness Here for f/u with the above complaints- she saw neurology last week with those records reviewed in detail.  She has almost a daily headache now, front, can go retroorbital.  Riboflavin and magnesium were recommended but she didn't tolerate them- thinks they were making her have n/v.  She has some coughing after she eats- did esophagram which was negative.   Had neuropsych testing last week with results expected next week.    Previous evaluation of all of the above has been negative.    She feels fatigued, often sleeps 2 hours in the late morning but tries to remain active.   She and  feel her anxiety is a big issue.       Objective   Vital Signs:   Temp 97.3 °F (36.3 °C)   Ht 162.6 cm (64\")   Wt 80.7 kg (178 lb)   BMI 30.55 kg/m²     Physical Exam  Constitutional:       General: She is not in acute distress.     Appearance: Normal appearance.   Cardiovascular:      Rate and Rhythm: Normal rate.   Musculoskeletal:      Right lower leg: No edema.      Left lower leg: No edema.   Neurological:      Mental Status: Mental status is at baseline.   Psychiatric:         Mood and Affect: Mood normal.         Behavior: Behavior normal.         Cognition and Memory: Memory is impaired.        Result Review :                 Assessment and Plan    Diagnoses and all orders for this visit:    1. Hyperthyroidism (Primary)  Comments:  unchanged for a long time, has never been treated - will refer back to Dr. Caballero to be sure it is not playing a role.   Orders:  -     Ambulatory Referral to Endocrinology    2. Adrenal insufficiency (CMS/[facility])  Comments:  stable-  Orders:  -     Ambulatory Referral to Endocrinology    3. Multiple lung nodules on CT  Comments:  ordered for 1 year f.u  Orders:  -     CT Chest Without Contrast; Future    4. Mild " cognitive impairment  Comments:  spent most of this visit on this and anxiety- will review eval by neuropsych.  She is going to work on exercise, stress/anxiety reduction.  Recheck 2 months.    Other orders  -     DULoxetine (CYMBALTA) 60 MG capsule; Take 1 capsule by mouth Daily.  Dispense: 90 capsule; Refill: 1      I spent 34 minutes caring for Margaret on this date of service. This time includes time spent by me in the following activities:reviewing tests, performing a medically appropriate examination and/or evaluation  and counseling and educating the patient/family/caregiver  Follow Up   Return in about 3 months (around 10/27/2021).  Patient was given instructions and counseling regarding her condition or for health maintenance advice. Please see specific information pulled into the AVS if appropriate.

## 2021-07-30 ENCOUNTER — CLINICAL SUPPORT (OUTPATIENT)
Dept: CARDIOLOGY | Facility: CLINIC | Age: 81
End: 2021-07-30

## 2021-07-30 DIAGNOSIS — I48.91 ATRIAL FIBRILLATION, UNSPECIFIED TYPE (HCC): Primary | ICD-10-CM

## 2021-07-30 LAB — INR PPP: 2.5 (ref 2–3)

## 2021-07-30 PROCEDURE — 36416 COLLJ CAPILLARY BLOOD SPEC: CPT | Performed by: INTERNAL MEDICINE

## 2021-07-30 PROCEDURE — 85610 PROTHROMBIN TIME: CPT | Performed by: INTERNAL MEDICINE

## 2021-08-05 ENCOUNTER — PROCEDURE VISIT (OUTPATIENT)
Dept: NEUROLOGY | Facility: CLINIC | Age: 81
End: 2021-08-05

## 2021-08-05 DIAGNOSIS — E53.8 B12 DEFICIENCY: Primary | ICD-10-CM

## 2021-08-05 PROCEDURE — 96372 THER/PROPH/DIAG INJ SC/IM: CPT | Performed by: NURSE PRACTITIONER

## 2021-08-05 RX ORDER — CYANOCOBALAMIN 1000 UG/ML
1000 INJECTION, SOLUTION INTRAMUSCULAR; SUBCUTANEOUS
Status: DISCONTINUED | OUTPATIENT
Start: 2021-08-05 | End: 2021-08-19

## 2021-08-05 RX ADMIN — CYANOCOBALAMIN 1000 MCG: 1000 INJECTION, SOLUTION INTRAMUSCULAR; SUBCUTANEOUS at 11:20

## 2021-08-06 ENCOUNTER — CLINICAL SUPPORT (OUTPATIENT)
Dept: CARDIOLOGY | Facility: CLINIC | Age: 81
End: 2021-08-06

## 2021-08-06 DIAGNOSIS — I48.19 PERSISTENT ATRIAL FIBRILLATION (HCC): Primary | ICD-10-CM

## 2021-08-06 LAB — INR PPP: 2.4 (ref 2–3)

## 2021-08-06 PROCEDURE — 85610 PROTHROMBIN TIME: CPT | Performed by: INTERNAL MEDICINE

## 2021-08-06 PROCEDURE — 36416 COLLJ CAPILLARY BLOOD SPEC: CPT | Performed by: INTERNAL MEDICINE

## 2021-08-11 ENCOUNTER — OFFICE VISIT (OUTPATIENT)
Dept: INTERNAL MEDICINE | Facility: CLINIC | Age: 81
End: 2021-08-11

## 2021-08-11 VITALS
BODY MASS INDEX: 30.05 KG/M2 | SYSTOLIC BLOOD PRESSURE: 114 MMHG | DIASTOLIC BLOOD PRESSURE: 68 MMHG | TEMPERATURE: 97.3 F | WEIGHT: 176 LBS | HEIGHT: 64 IN | HEART RATE: 88 BPM

## 2021-08-11 DIAGNOSIS — J06.9 UPPER RESPIRATORY TRACT INFECTION, UNSPECIFIED TYPE: Primary | ICD-10-CM

## 2021-08-11 PROCEDURE — 99214 OFFICE O/P EST MOD 30 MIN: CPT | Performed by: INTERNAL MEDICINE

## 2021-08-11 RX ORDER — DOXYCYCLINE HYCLATE 100 MG/1
100 CAPSULE ORAL 2 TIMES DAILY
Qty: 14 CAPSULE | Refills: 0 | Status: SHIPPED | OUTPATIENT
Start: 2021-08-11 | End: 2021-10-28

## 2021-08-11 NOTE — PROGRESS NOTES
"Chief Complaint  Sore Throat, Earache, and Dizziness    Subjective          Margaret Wilson presents to [facility]  History of Present Illness  Has the above complaints -feels more weak the last couple of days.  No fever/chills or sinus complaints.   She is eating and drinking ok, maybe some decrease in foods.   Saw Dr. Jefferson who gave her inhaler- Ventolin- feels it has helped with the cough.   The complaints of L ear are of fullness/popping. No sore throat.   Had neuropsych eval - did not think she has Alzheimers- possibly vascular dementia.  Waiting to hear from neurology.     Objective   Vital Signs:   /68   Pulse 88   Temp 97.3 °F (36.3 °C)   Ht 162.6 cm (64\")   Wt 79.8 kg (176 lb)   BMI 30.21 kg/m²     Physical Exam  Constitutional:       General: She is not in acute distress.     Appearance: She is ill-appearing.   HENT:      Right Ear: Tympanic membrane, ear canal and external ear normal.      Left Ear: Ear canal normal. Tympanic membrane is erythematous.      Mouth/Throat:      Mouth: Mucous membranes are dry.   Cardiovascular:      Rate and Rhythm: Normal rate and regular rhythm.   Pulmonary:      Effort: Pulmonary effort is normal.      Breath sounds: Normal breath sounds. No wheezing.        Result Review :                 Assessment and Plan    Diagnoses and all orders for this visit:    1. Upper respiratory tract infection, unspecified type (Primary)  Comments:  will tx with abx and reassess as needed.  Hold furosemide, looks dry, feels fatigued and BP down.    Other orders  -     doxycycline (VIBRAMYCIN) 100 MG capsule; Take 1 capsule by mouth 2 (Two) Times a Day.  Dispense: 14 capsule; Refill: 0        Follow Up   No follow-ups on file.  Patient was given instructions and counseling regarding her condition or for health maintenance advice. Please see specific information pulled into the AVS if appropriate.       "

## 2021-08-12 ENCOUNTER — TELEPHONE (OUTPATIENT)
Dept: INTERNAL MEDICINE | Facility: CLINIC | Age: 81
End: 2021-08-12

## 2021-08-12 NOTE — TELEPHONE ENCOUNTER
Caller: HA CANTU    Relationship: Emergency Contact    Best call back number: [phone]    What is the best time to reach you: ANY    Who are you requesting to speak with (clinical staff, provider,  specific staff member): PROVIDER    Do you require a callback: DAUGHTER IS CALLING TO SEE IF DR. NAVARRO COULD CONTACT HER REGARDING HER MOTHER'S RECENT EPISODES OF CONFUSION AND HALLUCINATING.

## 2021-08-12 NOTE — TELEPHONE ENCOUNTER
Spoke with PT's daughter she is upset that her mother talks about the past and how she needs to go home before her mother gets upset that she is late.  Pt states that she is not  to Mr. Alfredo that she has not seen him for years.      I told daughter that she needs to contact the neurologist and she said they told her 1 year for an appointment    Daughter just wants some guidance to what she needs to do

## 2021-08-17 ENCOUNTER — TELEPHONE (OUTPATIENT)
Dept: INTERNAL MEDICINE | Facility: CLINIC | Age: 81
End: 2021-08-17

## 2021-08-17 ENCOUNTER — TELEPHONE (OUTPATIENT)
Dept: NEUROLOGY | Facility: CLINIC | Age: 81
End: 2021-08-17

## 2021-08-17 ENCOUNTER — CLINICAL SUPPORT (OUTPATIENT)
Dept: INTERNAL MEDICINE | Facility: CLINIC | Age: 81
End: 2021-08-17

## 2021-08-17 DIAGNOSIS — R31.9 HEMATURIA, UNSPECIFIED TYPE: Primary | ICD-10-CM

## 2021-08-17 LAB
BILIRUB BLD-MCNC: NEGATIVE MG/DL
CLARITY, POC: CLEAR
COLOR UR: YELLOW
GLUCOSE UR STRIP-MCNC: NEGATIVE MG/DL
KETONES UR QL: NEGATIVE
LEUKOCYTE EST, POC: NEGATIVE
NITRITE UR-MCNC: NEGATIVE MG/ML
PH UR: 6 [PH] (ref 5–8)
PROT UR STRIP-MCNC: NEGATIVE MG/DL
RBC # UR STRIP: ABNORMAL /UL
SP GR UR: 1.01 (ref 1–1.03)
UROBILINOGEN UR QL: NORMAL

## 2021-08-17 PROCEDURE — 81003 URINALYSIS AUTO W/O SCOPE: CPT | Performed by: INTERNAL MEDICINE

## 2021-08-17 RX ORDER — NITROFURANTOIN 25; 75 MG/1; MG/1
100 CAPSULE ORAL 2 TIMES DAILY
Qty: 14 CAPSULE | Refills: 0 | Status: SHIPPED | OUTPATIENT
Start: 2021-08-17 | End: 2021-11-16 | Stop reason: HOSPADM

## 2021-08-17 NOTE — TELEPHONE ENCOUNTER
Patient's daughter (Maida) called stating her mother has had a lot of confusion since Saturday 8/14/21 she has gone way back to childhood days, may possibly have a UTI please call [phone].

## 2021-08-17 NOTE — TELEPHONE ENCOUNTER
I instructed them to reach out to PCP to do a workup. She could have some kind of underlying infection causing the confusion. Her daughter stated she will call them now to get her in.

## 2021-08-19 ENCOUNTER — PROCEDURE VISIT (OUTPATIENT)
Dept: NEUROLOGY | Facility: CLINIC | Age: 81
End: 2021-08-19

## 2021-08-19 ENCOUNTER — TELEPHONE (OUTPATIENT)
Dept: INTERNAL MEDICINE | Facility: CLINIC | Age: 81
End: 2021-08-19

## 2021-08-19 DIAGNOSIS — E53.8 B12 DEFICIENCY: Primary | ICD-10-CM

## 2021-08-19 PROCEDURE — 96372 THER/PROPH/DIAG INJ SC/IM: CPT | Performed by: NURSE PRACTITIONER

## 2021-08-19 RX ORDER — CYANOCOBALAMIN 1000 UG/ML
1000 INJECTION, SOLUTION INTRAMUSCULAR; SUBCUTANEOUS
Status: DISCONTINUED | OUTPATIENT
Start: 2021-08-19 | End: 2022-11-13

## 2021-08-19 RX ADMIN — CYANOCOBALAMIN 1000 MCG: 1000 INJECTION, SOLUTION INTRAMUSCULAR; SUBCUTANEOUS at 10:14

## 2021-08-19 NOTE — TELEPHONE ENCOUNTER
Caller: Chris Alfredo    Relationship: Emergency Contact    Best call back number: [phone]     Caller requesting test results:     What test was performed: URINE SAMPLE    When was the test performed: 08/16/21    Where was the test performed: IN OFFICE    Additional notes: PLEASE ADVISE

## 2021-08-25 ENCOUNTER — TELEPHONE (OUTPATIENT)
Dept: INTERNAL MEDICINE | Facility: CLINIC | Age: 81
End: 2021-08-25

## 2021-08-25 NOTE — TELEPHONE ENCOUNTER
Caller: HA CANTU    Relationship: Emergency Contact    Best call back number: [phone]     What is the best time to reach you: ANY TIME    Who are you requesting to speak with (clinical staff, provider,  specific staff member): CLINICAL STAFF     What was the call regarding: PATIENT'S DAUGHTER CALLED IN NEEDING DR NAVARRO TO PLEASE GIVE HER A CALL AS SOON AS POSSIBLE. SHE STATES THE PATIENT CALLED HER PHONE THIS MORNING AROUND 2 AM, NOT KNOWING THAT SHE WAS AT HER HOME. DAUGHTER STATES SHE HAD TO TALK THE PATIENT DOWN AND TELL HER SHE WAS IN FACT AT HOME AND NEEDED TO PUT PAJAMAS ON AND LAY DOWN AND GO TO BED.    THIS IS GETTING TO BE A LOT FOR THE  TO TAKE CARE OF THE PATIENT. THEY ARE NOT SURE IF THE PATIENT NEEDS TO BE SEEN BY DR. NAVARRO OR BY DR. URIBE. THEY NEED SOME GUIDANCE AND ARE ASKING ABOUT MAYBE GETTING THE PATIENT ON SOME MELATONIN TO HELP HER SLEEP AT NIGHT.    DAUGHTER STATES THIS INCIDENT IS NOT THE FIRST TIME THE PATIENT HAS GOTTEN UP IN THE MIDDLE OF THE NIGHT AND HAS TRIED TO GET DRESSED AND LEAVE LIKE SHE NEEDS TO BE SOMEWHERE.    PATIENT'S  HAS HAD TO HIDE THE CAR KEYS BEFORE TO PREVENT HER FROM LEAVING THE HOUSE IN THE MIDDLE OF THE NIGHT IN A CONFUSED STATE.     THEY ARE CURIOUS ABOUT DISCUSSING GETTING THE PATIENT INTO A SKILLED NURSE CARE SETTING. THEY KNOW IT IS A PROCESS AND AREN'T SURE WHERE TO START AND WANT TO SEE IF DR NAVARRO CAN HELP THEM WITH THIS PROCESS AND MAYBE RECOMMEND A PLACE FOR THEM      Do you require a callback: YES PLEASE

## 2021-08-26 ENCOUNTER — HOSPITAL ENCOUNTER (OUTPATIENT)
Dept: CT IMAGING | Facility: HOSPITAL | Age: 81
Discharge: HOME | End: 2021-08-26
Admitting: INTERNAL MEDICINE

## 2021-08-26 DIAGNOSIS — R91.8 MULTIPLE LUNG NODULES ON CT: ICD-10-CM

## 2021-08-26 PROCEDURE — 71250 CT THORAX DX C-: CPT

## 2021-08-26 NOTE — TELEPHONE ENCOUNTER
I am thinking we could switch her duloxetine to mirtazapine which can help older patients who have some confusion and sleep issues at night. Is that what they want to try?

## 2021-08-26 NOTE — TELEPHONE ENCOUNTER
Please call her - there is a medication we can try at night if she is getting disoriented then- otherwise, they need to meet with a  at the facility and see what the options are for more care.

## 2021-09-10 ENCOUNTER — CLINICAL SUPPORT (OUTPATIENT)
Dept: CARDIOLOGY | Facility: CLINIC | Age: 81
End: 2021-09-10

## 2021-09-10 DIAGNOSIS — I48.91 ATRIAL FIBRILLATION, UNSPECIFIED TYPE (HCC): Primary | ICD-10-CM

## 2021-09-10 LAB — INR PPP: 2.9 (ref 2–3)

## 2021-09-10 PROCEDURE — 85610 PROTHROMBIN TIME: CPT | Performed by: INTERNAL MEDICINE

## 2021-09-10 PROCEDURE — 36416 COLLJ CAPILLARY BLOOD SPEC: CPT | Performed by: INTERNAL MEDICINE

## 2021-10-01 ENCOUNTER — CLINICAL SUPPORT (OUTPATIENT)
Dept: CARDIOLOGY | Facility: CLINIC | Age: 81
End: 2021-10-01

## 2021-10-01 DIAGNOSIS — I48.19 PERSISTENT ATRIAL FIBRILLATION (HCC): Primary | ICD-10-CM

## 2021-10-01 LAB — INR PPP: 2.1 (ref 2–3)

## 2021-10-01 PROCEDURE — 36416 COLLJ CAPILLARY BLOOD SPEC: CPT | Performed by: INTERNAL MEDICINE

## 2021-10-01 PROCEDURE — 85610 PROTHROMBIN TIME: CPT | Performed by: INTERNAL MEDICINE

## 2021-10-22 ENCOUNTER — CLINICAL SUPPORT (OUTPATIENT)
Dept: CARDIOLOGY | Facility: CLINIC | Age: 81
End: 2021-10-22

## 2021-10-22 DIAGNOSIS — I48.11 LONGSTANDING PERSISTENT ATRIAL FIBRILLATION (HCC): Primary | ICD-10-CM

## 2021-10-22 LAB — INR PPP: 2.4 (ref 0.9–1.1)

## 2021-10-22 PROCEDURE — 36416 COLLJ CAPILLARY BLOOD SPEC: CPT | Performed by: INTERNAL MEDICINE

## 2021-10-22 PROCEDURE — 85610 PROTHROMBIN TIME: CPT | Performed by: INTERNAL MEDICINE

## 2021-10-28 ENCOUNTER — OFFICE VISIT (OUTPATIENT)
Dept: INTERNAL MEDICINE | Facility: CLINIC | Age: 81
End: 2021-10-28

## 2021-10-28 VITALS
HEIGHT: 64 IN | BODY MASS INDEX: 31.07 KG/M2 | TEMPERATURE: 97.3 F | HEART RATE: 82 BPM | DIASTOLIC BLOOD PRESSURE: 80 MMHG | WEIGHT: 182 LBS | SYSTOLIC BLOOD PRESSURE: 128 MMHG

## 2021-10-28 DIAGNOSIS — Z23 NEED FOR INFLUENZA VACCINATION: ICD-10-CM

## 2021-10-28 DIAGNOSIS — S86.891A MEDIAL TIBIAL STRESS SYNDROME, RIGHT, INITIAL ENCOUNTER: Primary | ICD-10-CM

## 2021-10-28 DIAGNOSIS — G31.84 MILD COGNITIVE IMPAIRMENT: ICD-10-CM

## 2021-10-28 PROCEDURE — 99213 OFFICE O/P EST LOW 20 MIN: CPT | Performed by: INTERNAL MEDICINE

## 2021-10-28 PROCEDURE — G0008 ADMIN INFLUENZA VIRUS VAC: HCPCS | Performed by: INTERNAL MEDICINE

## 2021-10-28 PROCEDURE — 90662 IIV NO PRSV INCREASED AG IM: CPT | Performed by: INTERNAL MEDICINE

## 2021-10-28 NOTE — PROGRESS NOTES
"Chief Complaint  Leg Pain    Subjective          Margaret Wilson presents to [facility]  History of Present Illness  Having pain in the R lower leg- has been about a month, thinks it feels swollen.  Worst when she stands up- no warmth/erythema.  No abd pain, back pain.  Eats and drinks well.  The pain is localized to the midshin, both sides of the bone.  No problems tolerating warfarin- doesn't noticed the a fib.     Objective   Vital Signs:   /80   Pulse 82   Temp 97.3 °F (36.3 °C)   Ht 162.6 cm (64\")   Wt 82.6 kg (182 lb)   BMI 31.24 kg/m²     Physical Exam  Constitutional:       Appearance: Normal appearance.   Cardiovascular:      Rate and Rhythm: Normal rate and regular rhythm.   Musculoskeletal:         General: Tenderness: mid R shin, no swelling.      Right lower leg: No edema.      Left lower leg: No edema.   Skin:     General: Skin is warm and dry.        Result Review :                 Assessment and Plan    Diagnoses and all orders for this visit:    1. Medial tibial stress syndrome, right, initial encounter (Primary)  Comments:  to ortho for eval  Orders:  -     Ambulatory Referral to Orthopedic Surgery    2. Need for influenza vaccination  -     Fluzone High-Dose 65+yrs ([id number])    3. Mild cognitive impairment  Comments:  stable- has good support.         Follow Up   Return in about 6 months (around 4/28/2022) for Medicare Wellness.  Patient was given instructions and counseling regarding her condition or for health maintenance advice. Please see specific information pulled into the AVS if appropriate.       "

## 2021-11-08 ENCOUNTER — OFFICE VISIT (OUTPATIENT)
Dept: ORTHOPEDIC SURGERY | Facility: CLINIC | Age: 81
End: 2021-11-08

## 2021-11-08 VITALS — HEIGHT: 64 IN | WEIGHT: 182 LBS | TEMPERATURE: 97.6 F | BODY MASS INDEX: 31.07 KG/M2

## 2021-11-08 DIAGNOSIS — G31.84 MILD COGNITIVE IMPAIRMENT: ICD-10-CM

## 2021-11-08 DIAGNOSIS — M21.061 ACQUIRED GENU VALGUM OF RIGHT KNEE: ICD-10-CM

## 2021-11-08 DIAGNOSIS — M17.11 PRIMARY OSTEOARTHRITIS OF RIGHT KNEE: ICD-10-CM

## 2021-11-08 DIAGNOSIS — M25.561 RIGHT KNEE PAIN, UNSPECIFIED CHRONICITY: Primary | ICD-10-CM

## 2021-11-08 PROCEDURE — 99213 OFFICE O/P EST LOW 20 MIN: CPT | Performed by: NURSE PRACTITIONER

## 2021-11-08 PROCEDURE — 73562 X-RAY EXAM OF KNEE 3: CPT | Performed by: NURSE PRACTITIONER

## 2021-11-08 PROCEDURE — 20610 DRAIN/INJ JOINT/BURSA W/O US: CPT | Performed by: NURSE PRACTITIONER

## 2021-11-08 RX ORDER — PREDNISONE 1 MG/1
5 TABLET ORAL DAILY
Qty: 90 TABLET | Refills: 1 | Status: SHIPPED | OUTPATIENT
Start: 2021-11-08 | End: 2022-01-27 | Stop reason: SDUPTHER

## 2021-11-08 RX ORDER — TRAZODONE HYDROCHLORIDE 50 MG/1
TABLET ORAL
Qty: 15 TABLET | Refills: 3 | Status: SHIPPED | OUTPATIENT
Start: 2021-11-08 | End: 2021-12-27 | Stop reason: SDUPTHER

## 2021-11-08 RX ADMIN — METHYLPREDNISOLONE ACETATE 80 MG: 80 INJECTION, SUSPENSION INTRA-ARTICULAR; INTRALESIONAL; INTRAMUSCULAR; SOFT TISSUE at 15:12

## 2021-11-08 NOTE — PROGRESS NOTES
Patient Name: Margaret Wilson   YOB: 1940  Referring Primary Care Physician: Juliane Dye MD  BMI: Body mass index is 31.24 kg/m².    Chief Complaint:    Chief Complaint   Patient presents with   • Right Foot - Initial Evaluation        HPI: New pt to me that presents with her  for right knee pain. Pt has cognitive impairment and  provides history. Denies any injury or trauma, has had progressive balance issues and forgot her cane. She had a KWAN on right by Dr Wilkins.Pain is worse with ambualtion and getting out of chair.    Margaret Wilson is a 81 y.o. female who presents today for evaluation of   Chief Complaint   Patient presents with   • Right Foot - Initial Evaluation       This problem is new to this examiner.     Subjective   Medications:   Home Medications:  Current Outpatient Medications on File Prior to Visit   Medication Sig   • donepezil (Aricept) 10 MG tablet Take 1 tablet by mouth Every Night.   • DULoxetine (CYMBALTA) 60 MG capsule Take 1 capsule by mouth Daily.   • furosemide (LASIX) 20 MG tablet TAKE 1 TABLET DAILY   • KLOR-CON 10 MEQ CR tablet TAKE 1 TABLET DAILY   • Magnesium 500 MG tablet Take 500 mg by mouth Daily.   • metoprolol succinate XL (TOPROL-XL) 100 MG 24 hr tablet Take 100 mg by mouth Daily.   • montelukast (SINGULAIR) 10 MG tablet TAKE 1 TABLET EVERY NIGHT   • nitrofurantoin, macrocrystal-monohydrate, (Macrobid) 100 MG capsule Take 1 capsule by mouth 2 (Two) Times a Day.   • predniSONE (DELTASONE) 5 MG tablet Take 1 tablet by mouth Daily. To take with the 10 mg tablet   • traZODone (DESYREL) 50 MG tablet TAKE 1/2 TABLET BY MOUTH AT BEDTIME AS NEEDED FOR INSOMNIA   • VITAMIN D PO Take 1,500 mg by mouth Daily.   • warfarin (COUMADIN) 5 MG tablet Take 1 tablet by mouth Every Night.   • [DISCONTINUED] KLOR-CON 10 MEQ CR tablet TAKE 1 TABLET DAILY     Current Facility-Administered Medications on File Prior to Visit   Medication   • cyanocobalamin  injection 1,000 mcg     Current Medications:  Scheduled Meds:cyanocobalamin, 1,000 mcg, Intramuscular, Q28 Days      Continuous Infusions:   PRN Meds:.    I have reviewed the patient's medical history in detail and updated the computerized patient record.  Review and summarization of old records includes:    Past Medical History:   Diagnosis Date   • Adrenal insufficiency (HCC)    • Anxiety    • Arthritis    • Asthma    • Atrial fibrillation (HCC)    • CHF (congestive heart failure) (HCC)    • Colon tumor    • COPD (chronic obstructive pulmonary disease) (HCC)    • Dementia (HCC)    • Depression    • Depression    • Difficulty walking    • GERD (gastroesophageal reflux disease)    • Goiter    • Headache, tension-type    • Hypertension    • Hypocalcemia    • Hypokalemia    • Memory loss    • Migraine    • Mild cognitive impairment    • Sleep apnea    • Valvular heart disease         Past Surgical History:   Procedure Laterality Date   • APPENDECTOMY     • BACK SURGERY      6 back surgeries   • BREAST SURGERY     • CARDIAC ABLATION     • CARDIAC ABLATION  2013   • CATARACT EXTRACTION, BILATERAL Bilateral    • CHOLECYSTECTOMY     • HYSTERECTOMY     • JOINT REPLACEMENT Bilateral     shoulder   • MITRAL VALVE REPAIR/REPLACEMENT  2011   • OOPHORECTOMY     • TONSILLECTOMY AND ADENOIDECTOMY     • TRICUSPID VALVE SURGERY  2011    Repair        Social History     Occupational History   • Not on file   Tobacco Use   • Smoking status: Never Smoker   • Smokeless tobacco: Never Used   Vaping Use   • Vaping Use: Never used   Substance and Sexual Activity   • Alcohol use: Not Currently   • Drug use: Never   • Sexual activity: Defer      Social History     Social History Narrative   • Not on file        Family History   Problem Relation Age of Onset   • Dementia Mother    • Cancer Father        ROS: 14 point review of systems was performed and all other systems were reviewed and are negative except for documented findings in HPI and  "today's encounter.     Allergies:   Allergies   Allergen Reactions   • Chlorhexidine Rash and Unknown - Low Severity     Very red skin INCLUDING CHORAPREP!!. Patient states she had no problem with CHG solution or CHG wipes  for surgery.  Red and hot   • Dilaudid [Hydromorphone Hcl] Anaphylaxis   • Ace Inhibitors Cough   • Bactrim [Sulfamethoxazole-Trimethoprim] Hives and Itching   • Chloraprep One Step [Chlorhexidine Gluconate] Rash     Red and hot   • Ciprofloxacin Rash   • Codeine Unknown (See Comments)     Unknown     • Keflex [Cephalexin] Unknown (See Comments)     Unknown; pt tolerated ceftriaxone on 10/22/2020     • Latex Rash   • Penicillins Rash     Rash required tx with \"pill\" doctor prescribed but no ED visit; pt tolerated ceftriaxone on 10/22/2020       Constitutional:  Denies fever, shaking or chills   Eyes:  Denies change in visual acuity   HENT:  Denies nasal congestion or sore throat   Respiratory:  Denies cough or shortness of breath   Cardiovascular:  Denies chest pain or severe LE edema   GI:  Denies abdominal pain, nausea, vomiting, bloody stools or diarrhea   Musculoskeletal:  Numbness, tingling, pain, or loss of motor function only as noted above in history of present illness.  : Denies painful urination or hematuria  Integument:  Denies rash, lesion or ulceration   Neurologic:  Denies headache or focal weakness  Endocrine:  Denies lymphadenopathy  Psych:  Denies confusion or change in mental status   Hem:  Denies active bleeding    OBJECTIVE:  Physical Exam:   Temp 97.6 °F (36.4 °C) (Temporal)   Ht 162.6 cm (64\")   Wt 82.6 kg (182 lb)   BMI 31.24 kg/m²     General Appearance:    Alert, cooperative, in no acute distress                  Eyes: conjunctiva clear  ENT: external ears and nose atraumatic  CV: no peripheral edema  Resp: normal respiratory effort  Skin: no rashes or wounds; normal turgor  Psych: mood and affect appropriate  Lymph: no nodes appreciated  Neuro: gross sensation " intact  Vascular:  Palpable peripheral pulse in noted extremity  Musculoskeletal Extremities: Skin is warm dry intact with good pulses mood sensation calves soft and nontender bilaterally she has medial and lateral joint line pain with patellofemoral crepitation hip is nontender calf is nontender has a very valgus deformity with ambulation    Radiology:   Right knee 3 views done for pain with no comparison views shows severe valgus deformity with end-stage osteoarthritis worse in the lateral compartment and in the patellofemoral    Assessment:     ICD-10-CM ICD-9-CM   1. Right knee pain, unspecified chronicity  M25.561 719.46   2. Primary osteoarthritis of right knee  M17.11 715.16   3. Mild cognitive impairment  G31.84 331.83   4. Acquired genu valgum of right knee  M21.061 736.41        Large Joint Arthrocentesis: R knee  Date/Time: 11/8/2021 3:12 PM  Consent given by: patient  Site marked: site marked  Timeout: Immediately prior to procedure a time out was called to verify the correct patient, procedure, equipment, support staff and site/side marked as required   Supporting Documentation  Indications: pain and joint swelling   Procedure Details  Location: knee - R knee  Preparation: Patient was prepped and draped in the usual sterile fashion  Needle gauge: 21 G.  Approach: anterolateral  Medications administered: 2 mL lidocaine (cardiac); 80 mg methylPREDNISolone acetate 80 MG/ML  Patient tolerance: patient tolerated the procedure well with no immediate complications             Plan: The diagnosis(es), natural history, pathophysiology and treatment for diagnosis(es) were discussed. Opportunity given and questions answered.  Biomechanics of pertinent body areas discussed.  When appropriate, the use of ambulatory aids discussed.  BMI:  The concept of BMI body mass index and its importance and implications discussed.    EXERCISES:  Advice on benefits of, and types of regular/moderate exercise pertaining to  orthopedic diagnosis(es).  MEDICATIONS:  The risks, benefits, warnings,side effects and alternatives of medications discussed.  Inflammation/pain control; with cold, heat, elevation and/or liniments discussed as appropriate  Cortisone Injection. See procedure note.  PT referral.  HOME EXERCISE/PT program encouraged  Handouts given for home physical therapy exercises  CONSULT: This Consult is done at the request of a requesting provider to whom I will send this report with this rendered opinion.  MEDICAL RECORDS reviewed from other provider(s) for past and current medical history pertinent to this complaint.      11/8/2021    Much of this encounter note is an electronic transcription/translation of spoken language to printed text. The electronic translation of spoken language may permit erroneous, or at times, nonsensical words or phrases to be inadvertently transcribed; Although I have reviewed the note for such errors, some may still exist

## 2021-11-08 NOTE — PATIENT INSTRUCTIONS
Knee Injection  A knee injection is a procedure to get medicine into your knee joint to relieve the pain, swelling, and stiffness of arthritis. Your health care provider uses a needle to inject medicine, which may also help to lubricate and cushion your knee joint. You may need more than one injection.  Tell a health care provider about:  · Any allergies you have.  · All medicines you are taking, including vitamins, herbs, eye drops, creams, and over-the-counter medicines.  · Any problems you or family members have had with anesthetic medicines.  · Any blood disorders you have.  · Any surgeries you have had.  · Any medical conditions you have.  · Whether you are pregnant or may be pregnant.  What are the risks?  Generally, this is a safe procedure. However, problems may occur, including:  · Infection.  · Bleeding.  · Symptoms that get worse.  · Damage to the area around your knee.  · Allergic reaction to any of the medicines.  · Skin reactions from repeated injections.  What happens before the procedure?  · Ask your health care provider about:  ? Changing or stopping your regular medicines. This is especially important if you are taking diabetes medicines or blood thinners.  ? Taking medicines such as aspirin and ibuprofen. These medicines can thin your blood. Do not take these medicines unless your health care provider tells you to take them.  ? Taking over-the-counter medicines, vitamins, herbs, and supplements.  · Plan to have a responsible adult take you home from the hospital or clinic.  What happens during the procedure?    · You will sit or lie down in a position for your knee to be treated.  · The skin over your kneecap will be cleaned with a germ-killing soap.  · You will be given a medicine that numbs the area (local anesthetic). You may feel some stinging.  · The medicine will be injected into your knee. The needle is carefully placed between your kneecap and your knee. The medicine is injected into the  joint space.  · The needle will be removed at the end of the procedure.  · A bandage (dressing) may be placed over the injection site.  The procedure may vary among health care providers and hospitals.  What can I expect after the procedure?  · Your blood pressure, heart rate, breathing rate, and blood oxygen level will be monitored until you leave the hospital or clinic.  · You may have to move your knee through its full range of motion. This helps to get all the medicine into your joint space.  · You will be watched to make sure that you do not have a reaction to the injected medicine.  · You may feel more pain, swelling, and warmth than you did before the injection. This reaction may last about 1-2 days.  Follow these instructions at home:  Medicines  · Take over-the-counter and prescription medicines only as told by your health care provider.  · Ask your health care provider if the medicine prescribed to you requires you to avoid driving or using machinery.  · Do not take medicines such as aspirin and ibuprofen unless your health care provider tells you to take them.  Injection site care  · Follow instructions from your health care provider about:  ? How to take care of your puncture site.  ? When and how you should change your dressing.  ? When you should remove your dressing.  · Check your injection area every day for signs of infection. Check for:  ? More redness, swelling, or pain after 2 days.  ? Fluid or blood.  ? Pus or a bad smell.  ? Warmth.  Managing pain, stiffness, and swelling    · If directed, put ice on the injection area. To do this:  ? Put ice in a plastic bag.  ? Place a towel between your skin and the bag.  ? Leave the ice on for 20 minutes, 2-3 times per day.  ? Remove the ice if your skin turns bright red. This is very important. If you cannot feel pain, heat, or cold, you have a greater risk of damage to the area.  · Do not apply heat to your knee.  · Raise (elevate) the injection area  above the level of your heart while you are sitting or lying down.    General instructions  · If you were given a dressing, keep it dry until your health care provider says it can be removed. Ask your health care provider when you can start showering or bathing.  · Avoid strenuous activities for as long as directed by your health care provider. Ask your health care provider when you can return to your normal activities.  · Keep all follow-up visits. This is important. You may need more injections.  Contact a health care provider if you have:  · A fever.  · Warmth in your injection area.  · Fluid, blood, or pus coming from your injection site.  · Symptoms at your injection site that last longer than 2 days after your procedure.  Get help right away if:  · Your knee turns very red.  · Your knee becomes very swollen.  · Your knee is in severe pain.  Summary  · A knee injection is a procedure to get medicine into your knee joint to relieve the pain, swelling, and stiffness of arthritis.  · A needle is carefully placed between your kneecap and your knee to inject medicine into the joint space.  · Before the procedure, ask your health care provider about changing or stopping your regular medicines, especially if you are taking diabetes medicines or blood thinners.  · Contact your health care provider if you have any problems or questions after your procedure.  This information is not intended to replace advice given to you by your health care provider. Make sure you discuss any questions you have with your health care provider.  Document Revised: 06/02/2021 Document Reviewed: 06/02/2021  [employer] Patient Education © 2021 [employer]

## 2021-11-10 RX ORDER — METHYLPREDNISOLONE ACETATE 80 MG/ML
80 INJECTION, SUSPENSION INTRA-ARTICULAR; INTRALESIONAL; INTRAMUSCULAR; SOFT TISSUE
Status: COMPLETED | OUTPATIENT
Start: 2021-11-08 | End: 2021-11-08

## 2021-11-12 ENCOUNTER — ANTICOAGULATION VISIT (OUTPATIENT)
Dept: CARDIOLOGY | Facility: CLINIC | Age: 81
End: 2021-11-12

## 2021-11-12 DIAGNOSIS — I48.0 PAROXYSMAL ATRIAL FIBRILLATION (HCC): Primary | ICD-10-CM

## 2021-11-12 LAB — INR PPP: 2.8 (ref 0.9–1.1)

## 2021-11-12 PROCEDURE — 85610 PROTHROMBIN TIME: CPT | Performed by: INTERNAL MEDICINE

## 2021-11-12 PROCEDURE — 36416 COLLJ CAPILLARY BLOOD SPEC: CPT | Performed by: INTERNAL MEDICINE

## 2021-11-16 ENCOUNTER — OFFICE VISIT (OUTPATIENT)
Dept: NEUROLOGY | Facility: CLINIC | Age: 81
End: 2021-11-16

## 2021-11-16 ENCOUNTER — LAB (OUTPATIENT)
Dept: LAB | Facility: HOSPITAL | Age: 81
End: 2021-11-16

## 2021-11-16 VITALS
HEIGHT: 64 IN | RESPIRATION RATE: 16 BRPM | DIASTOLIC BLOOD PRESSURE: 72 MMHG | OXYGEN SATURATION: 97 % | SYSTOLIC BLOOD PRESSURE: 126 MMHG | BODY MASS INDEX: 30.56 KG/M2 | HEART RATE: 84 BPM | WEIGHT: 179 LBS

## 2021-11-16 DIAGNOSIS — G47.33 OBSTRUCTIVE SLEEP APNEA: ICD-10-CM

## 2021-11-16 DIAGNOSIS — G89.29 CHRONIC NONINTRACTABLE HEADACHE, UNSPECIFIED HEADACHE TYPE: ICD-10-CM

## 2021-11-16 DIAGNOSIS — R42 DIZZINESS: ICD-10-CM

## 2021-11-16 DIAGNOSIS — R51.9 CHRONIC NONINTRACTABLE HEADACHE, UNSPECIFIED HEADACHE TYPE: ICD-10-CM

## 2021-11-16 DIAGNOSIS — F32.A DEPRESSION, UNSPECIFIED DEPRESSION TYPE: ICD-10-CM

## 2021-11-16 DIAGNOSIS — R41.89 COGNITIVE IMPAIRMENT: ICD-10-CM

## 2021-11-16 DIAGNOSIS — E53.8 B12 DEFICIENCY: ICD-10-CM

## 2021-11-16 DIAGNOSIS — R26.89 BALANCE PROBLEM: ICD-10-CM

## 2021-11-16 LAB — VIT B12 BLD-MCNC: 612 PG/ML (ref 211–946)

## 2021-11-16 PROCEDURE — 99215 OFFICE O/P EST HI 40 MIN: CPT | Performed by: NURSE PRACTITIONER

## 2021-11-16 PROCEDURE — 36415 COLL VENOUS BLD VENIPUNCTURE: CPT

## 2021-11-16 PROCEDURE — 82607 VITAMIN B-12: CPT

## 2021-11-16 RX ORDER — MEMANTINE HYDROCHLORIDE 5 MG/1
TABLET ORAL
Qty: 120 TABLET | Refills: 0 | Status: SHIPPED | OUTPATIENT
Start: 2021-11-16 | End: 2022-05-19 | Stop reason: SDUPTHER

## 2021-11-16 NOTE — PROGRESS NOTES
DOS: 2021  NAME: Margaret Wilson   : 1940  PCP: Juliane Dye MD    Chief Complaint   Patient presents with   • Cognitive Impairment      SUBJECTIVE  Neurological Problem:  81 y.o. RHW female with headaches, dizziness, balance issues, memory complaints, PAF (on warfarin), adrenal insufficiency (followed by Dr. 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 She presents today to f/u for multiple neurologic complaints including memory loss, headache and balance problems.  She is accompanied by her spouse.    Interval History:   **For previous detailed history, please see progress note dated 2019.    Ms. Freitas was initially evaluated in Dec. 2019 for headaches and longstanding c/o dizziness (see extensive note on  for details). At that time her neurologic exam was essentially unremarkable except for unsteady gait and MRI brain unrevealing. Chronic dizziness and imbalance was thought to be multifactorial due to BPPV, orthostatic hypotension and also with lumbar issues contributing to gait dysfunction. A CTA h/n showed moderate small vessel disease, no acute findings. CTA neck notable for some multilevel cervical spondylosis, minimal left ICA stenosis read at 20%; Intracranially, arteries were unremarkable, including posterior circulation, no concerns for vestibular insufficiency. In regards to headaches at that time, she felt they were well controlled, did not want any pharmacologic treatment.      Subsequently underwent PT advised to rehab, which improved her balance.  She did have ongoing complaints of memory loss and was treated with Aricept 10 mg as well as Celexa for depression which was thought to be a contributing factor.  A Linn done in 2021 was 18/30, majority of points being lost on recall and executive functioning.  She was referred for neuropsych evaluation and B12 replacement protocol as  her B12 was low normal.  She was last seen in early July 2021 and had not yet had her neuropsych evaluation, which was eventually completed on 7/29/2021 with diagnoses of MCI, vascular type and major depressive disorder.    She presents today, with primary complaint being her memory as well as her depression.  Headaches seem to be currently well controlled.  She continues to use a cane for her balance, no falls, no reports of BPPV issues since last seen.  She reports not wanting to go out and attend functions at their living facility, states she feels depressed.  She does get tearful during part of the interview.  According to spouse her daughter has encouraged her to get out as she is normally a very social person.  She does not see a psychiatrist nor psychologist.  Her appetite is good.  No reports of swallowing difficulties, no choking.  Sleep is good as well, no nighttime wandering's.  No change in personality or behavior except for not wanting to participate in many social functions, more withdrawn.  She has had an episode of sundowning according to spouse where she did not recognize him, but this rarely occurs.  She is independent of all ADLs.  Spouse does manage her medications.  She is doing well on Aricept, no reports of adverse side effects.  She is no longer getting B12 replacement, her last B12 in March was 376.    Review of Systems:Review of Systems   Constitutional: Positive for fatigue (x 2 months). Negative for activity change and appetite change.   HENT: Positive for trouble swallowing. Negative for ear pain and tinnitus.    Eyes: Positive for photophobia. Negative for pain and visual disturbance.   Musculoskeletal: Positive for back pain, gait problem (2-3 falls within last year.) and neck pain.   Neurological: Positive for dizziness, tremors, weakness (right leg weakness), light-headedness and headaches (takes tylonol and aleve. Front of head.  x 3-4 months).   Psychiatric/Behavioral: Positive for  confusion. Negative for agitation, behavioral problems, decreased concentration, dysphoric mood, hallucinations, self-injury, sleep disturbance and suicidal ideas. The patient is nervous/anxious. The patient is not hyperactive.     Above ROS reviewed    The following portions of the patient's history were reviewed and updated as appropriate: allergies, current medications, past family history, past medical history, past social history, past surgical history and problem list.    Current Medications:   Current Outpatient Medications:   •  donepezil (Aricept) 10 MG tablet, Take 1 tablet by mouth Every Night., Disp: 90 tablet, Rfl: 3  •  DULoxetine (CYMBALTA) 60 MG capsule, Take 1 capsule by mouth Daily., Disp: 90 capsule, Rfl: 1  •  furosemide (LASIX) 20 MG tablet, TAKE 1 TABLET DAILY, Disp: 90 tablet, Rfl: 3  •  KLOR-CON 10 MEQ CR tablet, TAKE 1 TABLET DAILY, Disp: 90 tablet, Rfl: 3  •  Magnesium 500 MG tablet, Take 500 mg by mouth Daily., Disp: 30 tablet, Rfl: 3  •  metoprolol succinate XL (TOPROL-XL) 100 MG 24 hr tablet, Take 100 mg by mouth Daily., Disp: , Rfl:   •  montelukast (SINGULAIR) 10 MG tablet, TAKE 1 TABLET EVERY NIGHT, Disp: 90 tablet, Rfl: 3  •  predniSONE (DELTASONE) 5 MG tablet, Take 1 tablet by mouth Daily. To take with the 10 mg tablet, Disp: 90 tablet, Rfl: 1  •  traZODone (DESYREL) 50 MG tablet, TAKE 1/2 TABLET BY MOUTH AT BEDTIME AS NEEDED FOR INSOMNIA, Disp: 15 tablet, Rfl: 3  •  VITAMIN D PO, Take 1,500 mg by mouth Daily., Disp: , Rfl:   •  warfarin (COUMADIN) 5 MG tablet, Take 1 tablet by mouth Every Night., Disp: 90 tablet, Rfl: 1  •  memantine (NAMENDA) 5 MG tablet, 1 tab daily for 1 week, then 1 tab twice daily for 1 week, then 1 tab in the morning and 2 tabs at night for 1 week, then 2 tabs twice daily, Disp: 120 tablet, Rfl: 0  •  nitrofurantoin, macrocrystal-monohydrate, (Macrobid) 100 MG capsule, Take 1 capsule by mouth 2 (Two) Times a Day., Disp: 14 capsule, Rfl: 0    Current  Facility-Administered Medications:   •  cyanocobalamin injection 1,000 mcg, 1,000 mcg, Intramuscular, Q28 Days, Sena Pedersen ARMIN GARCIA, 1,000 mcg at 08/19/21 1014  **I did not stop or change the above medications.  Patient's medication list was updated to reflect medications they have reported as currently taking, including medication changes made by other providers.    OBJECTIVE  Vitals:    11/16/21 1015   BP: 126/72   Pulse: 84   Resp: 16   SpO2: 97%     Body mass index is 30.73 kg/m².    Diagnostics:  Neuropsychological evaluation, Alvaro and Alisha, 7/22/2021: Diagnoses include MCI, vascular type; major depressive disorder.  [place] today 16/30    Laboratory Results:         Lab Results   Component Value Date    WBC 7.19 03/31/2021    HGB 13.8 03/31/2021    HCT 42.7 03/31/2021    MCV 95.7 03/31/2021     03/31/2021     Lab Results   Component Value Date    GLUCOSE 79 03/31/2021    BUN 12 03/31/2021    CREATININE 0.81 03/31/2021    EGFRIFNONA 68 03/31/2021    EGFRIFAFRI 82 03/31/2021    BCR 14.8 03/31/2021    K 4.4 03/31/2021    CO2 27.3 03/31/2021    CALCIUM 9.5 03/31/2021    PROTENTOTREF 6.4 03/31/2021    ALBUMIN 4.20 03/31/2021    LABIL2 1.9 03/31/2021    AST 23 03/31/2021    ALT 20 03/31/2021     No results found for: HGBA1C  No results found for: CHOL  No results found for: HDL  No results found for: LDL  No results found for: TRIG  No results found for: RPR  Lab Results   Component Value Date    TSH 0.016 (L) 03/31/2021     Lab Results   Component Value Date    AMFRFBQV36 612 11/16/2021       Physical Exam:  GENERAL: NAD  HEENT: Normocephalic, atraumatic   COR: RRR  Resp: Even and unlabored  Extremities: No signs of distal embolization.   Skin: No rashes, lesions or ulcers.  Psychiatric: Somewhat labile mood, cheerfullness and then followed by tears    Neurological:   MS: Alert.  Oriented to self, spouse, place.  [place] 16/30Language normal.  Naming intact.  Impaired recent and remote memory.  No  neglect. Follows all commands.  CN: II-XII grossly normal  Motor: Normal strength and tone throughout.  Sensory: Intact to light touch in arms and legs  Station and Gait: Mildly cautious, antalgic gait, utilizing cane today  Coordination: Normal finger to nose bilaterally    Impression/Plan:    1.  Cognitive impairment - Reviewed results of neuropsych diagnoses of MCI, vascular type along with major depressive disorder; however MoCA today 16/30.  Long discussion with patient and spouse regarding importance of treating affective disorder which can in turn improve her cognition.  She currently declines psychiatry/therapy referral.  I have encouraged her to participate in social engagements at her facility.  We also reviewed lifestyle modifications for cognitive health.  Continue aricept 10 mg daily. Will start memantine 5 mg daily, increased by 5 mg every week, detailed instructions reviewed with patient and spouse today.  We will also check a B12 today.      2.  Dizziness and balance issues - previous work-up negative for central etiology.  Symptoms likely multifactorial due to combination of BPPV, orthostatic hypotension and hip/lumbar issues contributing to her gait dysfunction.  Currently stable, if worsening BPV, recommend reevaluation at the [facility].  We reviewed falls risk prevention including keeping well-hydrated, transitioning slowly and use of compression stockings.    3.  Chronic headaches -ongoing issue, she currently declines any kind of pharmacologic treatment at this time.  Prefers to use Tylenol as needed, reviewed appropriate use, using sparingly, no more than 4 g in a 24-hour period.  Recommend continue compliance with CPAP, increase water intake.    Follow up in 3 months, sooner symptoms warrant.      I spent a total of 43 minutes today in reviewing records, prior diagnostics, examination of patient as well as counseling and educating patient regarding dementia, headache, balance, reviewing  diagnostics with patient, pharmacologic treatment options including purpose, risk, benefits, possible side-effects, recommendations, lifestyle modifications, coordination of care and documenting plan of care.        Diagnoses and all orders for this visit:    1. B12 deficiency (Primary)  -     Vitamin B12; Future    Other orders  -     memantine (NAMENDA) 5 MG tablet; 1 tab daily for 1 week, then 1 tab twice daily for 1 week, then 1 tab in the morning and 2 tabs at night for 1 week, then 2 tabs twice daily  Dispense: 120 tablet; Refill: 0        Coding      Dictated using Dragon

## 2021-11-18 ENCOUNTER — TELEPHONE (OUTPATIENT)
Dept: NEUROLOGY | Facility: CLINIC | Age: 81
End: 2021-11-18

## 2021-11-18 NOTE — TELEPHONE ENCOUNTER
----- Message from ARMIN Vega sent at 11/17/2021 10:26 PM EST -----  Please let patient know that her B12 was in normal range. Thanks.

## 2021-11-22 RX ORDER — CITALOPRAM 20 MG/1
TABLET ORAL
Qty: 90 TABLET | Refills: 1 | Status: SHIPPED | OUTPATIENT
Start: 2021-11-22 | End: 2022-03-01 | Stop reason: SDUPTHER

## 2021-11-29 DIAGNOSIS — I48.0 PAROXYSMAL ATRIAL FIBRILLATION (HCC): ICD-10-CM

## 2021-11-29 RX ORDER — WARFARIN SODIUM 5 MG/1
5 TABLET ORAL NIGHTLY
Qty: 90 TABLET | Refills: 0 | Status: SHIPPED | OUTPATIENT
Start: 2021-11-29 | End: 2022-03-28 | Stop reason: SDUPTHER

## 2021-11-29 NOTE — TELEPHONE ENCOUNTER
CPA pt    Patient requesting refill on Warfarin 5 mg 90 day supply with refills sent to [facility]. Would also like to be notified when script has been sent in.

## 2021-12-10 ENCOUNTER — ANTICOAGULATION VISIT (OUTPATIENT)
Dept: CARDIOLOGY | Facility: CLINIC | Age: 81
End: 2021-12-10

## 2021-12-10 DIAGNOSIS — I48.91 ATRIAL FIBRILLATION, UNSPECIFIED TYPE (HCC): Primary | ICD-10-CM

## 2021-12-10 LAB — INR PPP: 2.3 (ref 0.9–1.1)

## 2021-12-10 PROCEDURE — 36416 COLLJ CAPILLARY BLOOD SPEC: CPT | Performed by: INTERNAL MEDICINE

## 2021-12-10 PROCEDURE — 85610 PROTHROMBIN TIME: CPT | Performed by: INTERNAL MEDICINE

## 2021-12-23 ENCOUNTER — TELEPHONE (OUTPATIENT)
Dept: CARDIOLOGY | Facility: CLINIC | Age: 81
End: 2021-12-23

## 2021-12-23 DIAGNOSIS — I48.91 ATRIAL FIBRILLATION, UNSPECIFIED TYPE (HCC): Primary | ICD-10-CM

## 2021-12-23 RX ORDER — METOPROLOL SUCCINATE 100 MG/1
100 TABLET, EXTENDED RELEASE ORAL DAILY
Qty: 90 TABLET | Refills: 0 | Status: SHIPPED | OUTPATIENT
Start: 2021-12-23 | End: 2021-12-23 | Stop reason: SDUPTHER

## 2021-12-23 RX ORDER — METOPROLOL SUCCINATE 100 MG/1
100 TABLET, EXTENDED RELEASE ORAL DAILY
Qty: 90 TABLET | Refills: 1 | Status: SHIPPED | OUTPATIENT
Start: 2021-12-23 | End: 2022-01-04 | Stop reason: SDUPTHER

## 2021-12-23 NOTE — TELEPHONE ENCOUNTER
Caller: Margaret Wilson    Relationship: Self    Best call back number: [phone]    Requested Prescriptions:   Requested Prescriptions     Pending Prescriptions Disp Refills   • metoprolol succinate XL (TOPROL-XL) 100 MG 24 hr tablet       Sig: Take 1 tablet by mouth Daily.        Pharmacy where request should be sent: [facility] - [place], [place] - [address] AT [address] [facility] - [phone] [facility] [phone]     Additional details provided by patient: PATIENT HAS LESS THAN 3 DAYS LEFT. PLEASE ADVISE THANK YOU!    Does the patient have less than a 3 day supply:  [] Yes  [x] No    Virginia Vargas Rep   12/23/21 10:54 EST

## 2021-12-23 NOTE — TELEPHONE ENCOUNTER
Need refill of:   - Metoprolol 100 MG   Please send to [facility] pharmacy     Thanks,   René

## 2021-12-27 RX ORDER — TRAZODONE HYDROCHLORIDE 50 MG/1
25 TABLET ORAL NIGHTLY
Qty: 15 TABLET | Refills: 0 | Status: SHIPPED | OUTPATIENT
Start: 2021-12-27 | End: 2022-06-21 | Stop reason: SDUPTHER

## 2021-12-27 NOTE — TELEPHONE ENCOUNTER
Caller: BISHOP    Relationship:      Best call back number: [phone]    Requested Prescriptions:   Requested Prescriptions     Pending Prescriptions Disp Refills   • traZODone (DESYREL) 50 MG tablet 15 tablet 3        Pharmacy where request should be sent: [facility] [address] [place] [address] - [phone] [facility] [phone] FX     Additional details provided by patient: PT AND  WOULD LIKE TO TRANSFER THIS MEDICATION TO [facility] D/T COST OF MEDICATION, HE ALSO STATES THEY REFILLED THE NAMENDA BUT ONLY RECEIVED 60 TABLETS. PLEASE REVIEW AND REVISE.     PT ONLY HAS ENOUGH MEDICATION FOR TODAY     Does the patient have less than a 3 day supply:  [x] Yes  [] No    Virginia Azul Rep   12/27/21 08:58 EST

## 2022-01-04 ENCOUNTER — TELEPHONE (OUTPATIENT)
Dept: INTERNAL MEDICINE | Facility: CLINIC | Age: 82
End: 2022-01-04

## 2022-01-04 DIAGNOSIS — I48.91 ATRIAL FIBRILLATION, UNSPECIFIED TYPE: ICD-10-CM

## 2022-01-04 RX ORDER — METOPROLOL SUCCINATE 100 MG/1
100 TABLET, EXTENDED RELEASE ORAL DAILY
Qty: 90 TABLET | Refills: 1 | Status: SHIPPED | OUTPATIENT
Start: 2022-01-04 | End: 2022-06-13 | Stop reason: SDUPTHER

## 2022-01-04 NOTE — TELEPHONE ENCOUNTER
Caller: Chris Alfredo    Relationship: Emergency Contact    Best call back number: [phone] (H)    Requested Prescriptions:   ANY REFILLS ARE TO GO TO [email or web address] FROM [facility] OUT     Pharmacy where request should be sent:[email or web address] - [place] [address] [facility], [address] - [phone]  - [phone] FX          Additional details provided by patient:     Does the patient have less than a 3 day supply:  [] Yes  [] No    Virginia Diane Rep   01/04/22 11:56 EST         THANKS

## 2022-01-12 ENCOUNTER — CLINICAL SUPPORT (OUTPATIENT)
Dept: CARDIOLOGY | Facility: CLINIC | Age: 82
End: 2022-01-12

## 2022-01-12 DIAGNOSIS — I48.91 ATRIAL FIBRILLATION, UNSPECIFIED TYPE: Primary | ICD-10-CM

## 2022-01-12 LAB — INR PPP: 3 (ref 2–3)

## 2022-01-12 PROCEDURE — 36416 COLLJ CAPILLARY BLOOD SPEC: CPT | Performed by: INTERNAL MEDICINE

## 2022-01-12 PROCEDURE — 85610 PROTHROMBIN TIME: CPT | Performed by: INTERNAL MEDICINE

## 2022-01-20 ENCOUNTER — OFFICE VISIT (OUTPATIENT)
Dept: ORTHOPEDIC SURGERY | Facility: CLINIC | Age: 82
End: 2022-01-20

## 2022-01-20 VITALS — WEIGHT: 172 LBS | TEMPERATURE: 96.8 F | RESPIRATION RATE: 18 BRPM | BODY MASS INDEX: 29.37 KG/M2 | HEIGHT: 64 IN

## 2022-01-20 DIAGNOSIS — M17.11 PRIMARY OSTEOARTHRITIS OF RIGHT KNEE: Primary | ICD-10-CM

## 2022-01-20 PROCEDURE — 99214 OFFICE O/P EST MOD 30 MIN: CPT | Performed by: NURSE PRACTITIONER

## 2022-01-20 NOTE — PROGRESS NOTES
Patient Name: Margaret Gu   YOB: 1940  Referring Primary Care Physician: Juliane Dye MD  BMI: Body mass index is 29.52 kg/m².    Chief Complaint:    Chief Complaint   Patient presents with   • Right Knee - Follow-up, Pain        HPI: Pt returns for right knee pain. She had a cortisone injection in November by me and it did not help.  She is here with her  and she asks questions repeatedly and has a history of cognitive decline.  She is very frustrated with the knee pain she is ambulating with a cane and has a walker at home.  They wanted to discuss all their options. She is on coumadin .    Margaret Gu is a 81 y.o. female who presents today for evaluation of   Chief Complaint   Patient presents with   • Right Knee - Follow-up, Pain         Subjective   Medications:   Home Medications:  Current Outpatient Medications on File Prior to Visit   Medication Sig   • citalopram (CeleXA) 20 MG tablet TAKE 1 TABLET DAILY   • donepezil (Aricept) 10 MG tablet Take 1 tablet by mouth Every Night.   • DULoxetine (CYMBALTA) 60 MG capsule Take 1 capsule by mouth Daily.   • furosemide (LASIX) 20 MG tablet TAKE 1 TABLET DAILY   • KLOR-CON 10 MEQ CR tablet TAKE 1 TABLET DAILY   • Magnesium 500 MG tablet Take 500 mg by mouth Daily.   • memantine (NAMENDA) 5 MG tablet 1 tab daily for 1 week, then 1 tab twice daily for 1 week, then 1 tab in the morning and 2 tabs at night for 1 week, then 2 tabs twice daily   • metoprolol succinate XL (TOPROL-XL) 100 MG 24 hr tablet Take 1 tablet by mouth Daily.   • montelukast (SINGULAIR) 10 MG tablet TAKE 1 TABLET EVERY NIGHT   • predniSONE (DELTASONE) 5 MG tablet Take 1 tablet by mouth Daily. To take with the 10 mg tablet   • traZODone (DESYREL) 50 MG tablet Take 0.5 tablets by mouth Every Night.   • VITAMIN D PO Take 1,500 mg by mouth Daily.   • warfarin (COUMADIN) 5 MG tablet Take 1 tablet by mouth Every Night.     Current Facility-Administered  Medications on File Prior to Visit   Medication   • cyanocobalamin injection 1,000 mcg     Current Medications:  Scheduled Meds:cyanocobalamin, 1,000 mcg, Intramuscular, Q28 Days      Continuous Infusions:   PRN Meds:.    I have reviewed the patient's medical history in detail and updated the computerized patient record.  Review and summarization of old records includes:    Past Medical History:   Diagnosis Date   • Adrenal insufficiency (HCC)    • Anxiety    • Arthritis    • Asthma    • Atrial fibrillation (HCC)    • CHF (congestive heart failure) ([facility])    • Colon tumor    • COPD (chronic obstructive pulmonary disease) (HCC)    • Dementia (HCC)    • Depression    • Depression    • Difficulty walking    • GERD (gastroesophageal reflux disease)    • Goiter    • Headache, tension-type    • Hypertension    • Hypocalcemia    • Hypokalemia    • Memory loss    • Migraine    • Mild cognitive impairment    • Sleep apnea    • Valvular heart disease         Past Surgical History:   Procedure Laterality Date   • APPENDECTOMY     • BACK SURGERY      6 back surgeries   • BREAST SURGERY     • CARDIAC ABLATION     • CARDIAC ABLATION  2013   • CATARACT EXTRACTION, BILATERAL Bilateral    • CHOLECYSTECTOMY     • HYSTERECTOMY     • JOINT REPLACEMENT Bilateral     shoulder   • MITRAL VALVE REPAIR/REPLACEMENT  2011   • OOPHORECTOMY     • TONSILLECTOMY AND ADENOIDECTOMY     • TRICUSPID VALVE SURGERY  2011    Repair        Social History     Occupational History   • Not on file   Tobacco Use   • Smoking status: Never Smoker   • Smokeless tobacco: Never Used   Vaping Use   • Vaping Use: Never used   Substance and Sexual Activity   • Alcohol use: Not Currently   • Drug use: Never   • Sexual activity: Defer      Social History     Social History Narrative   • Not on file        Family History   Problem Relation Age of Onset   • Dementia Mother    • Cancer Father        ROS: 14 point review of systems was performed and all other systems were  "reviewed and are negative except for documented findings in HPI and today's encounter.     Allergies:   Allergies   Allergen Reactions   • Chlorhexidine Rash and Unknown - Low Severity     Very red skin INCLUDING CHORAPREP!!. Patient states she had no problem with CHG solution or CHG wipes  for surgery.  Red and hot   • Dilaudid [Hydromorphone Hcl] Anaphylaxis   • Ace Inhibitors Cough   • Bactrim [Sulfamethoxazole-Trimethoprim] Hives and Itching   • Chloraprep One Step [Chlorhexidine Gluconate] Rash     Red and hot   • Ciprofloxacin Rash   • Codeine Unknown (See Comments)     Unknown     • Keflex [Cephalexin] Unknown (See Comments)     Unknown; pt tolerated ceftriaxone on 10/22/2020     • Latex Rash   • Penicillins Rash     Rash required tx with \"pill\" doctor prescribed but no ED visit; pt tolerated ceftriaxone on 10/22/2020       Constitutional:  Denies fever, shaking or chills   Eyes:  Denies change in visual acuity   HENT:  Denies nasal congestion or sore throat   Respiratory:  Denies cough or shortness of breath   Cardiovascular:  Denies chest pain or severe LE edema   GI:  Denies abdominal pain, nausea, vomiting, bloody stools or diarrhea   Musculoskeletal:  Numbness, tingling, pain, or loss of motor function only as noted above in history of present illness.  : Denies painful urination or hematuria  Integument:  Denies rash, lesion or ulceration   Neurologic:  Denies headache or focal weakness  Endocrine:  Denies lymphadenopathy  Psych:  Denies confusion or change in mental status   Hem:  Denies active bleeding    OBJECTIVE:  Physical Exam: 81 y.o. female  Wt Readings from Last 3 Encounters:   01/20/22 78 kg (172 lb)   11/16/21 81.2 kg (179 lb)   11/08/21 82.6 kg (182 lb)     Ht Readings from Last 1 Encounters:   01/20/22 162.6 cm (64\")     Body mass index is 29.52 kg/m².  Vitals:    01/20/22 1111   Resp: 18   Temp: 96.8 °F (36 °C)     Vital signs reviewed.     General Appearance:    Alert, cooperative, in " no acute distress                  Eyes: conjunctiva clear  ENT: external ears and nose atraumatic  CV: no peripheral edema  Resp: normal respiratory effort  Skin: no rashes or wounds; normal turgor  Psych: mood and affect appropriate asked repetitive questions  Lymph: no nodes appreciated  Neuro: gross sensation intact  Vascular:  Palpable peripheral pulse in noted extremity  Musculoskeletal Extremities: Skin is warm dry intact with good pulses and sensation right knee has medial joint line tenderness with effusion and also lateral effusion patella femoral crepitation and calves are soft and nontender bilaterally ambulating with a cane    Radiology:   21 reviewed and end stage djd valgus deformity     Assessment:     ICD-10-CM ICD-9-CM   1. Primary osteoarthritis of right knee  M17.11 715.16        Procedures  Lengthy discussion with the patient and her  including discussing total knee arthroplasty and the risk and benefits with her history of cognitive impairment I am not sure that she would be a really good candidate as anesthesia can affect this I would like to go ahead and do a cortisone injection today at a month early and risk and benefits were discussed signed her up for viscosupplementation and see if this will help enroll her in some physical therapy to work on proprioception balance and strengthening and see if we can stave off a total knee arthroplasty.  She expresses that her mother was 99 when she  and she wants to be very active and not sit in a chair all day which I respect I would like for her to talk to Dr. Dye about her medical risk for surgery she is on coumadin and can entertain that when the time comes.    MDM/Plan:   The diagnosis(es), natural history, pathophysiology and treatment for diagnosis(es) were discussed. Opportunity given and questions answered.  Biomechanics of pertinent body areas discussed.  When appropriate, the use of ambulatory aids discussed.  BMI:  The  concept of BMI body mass index and its importance and implications discussed.    EXERCISES:  Advice on benefits of, and types of regular/moderate exercise pertaining to orthopedic diagnosis(es).  MEDICATIONS:  The risks, benefits, warnings,side effects and alternatives of medications discussed.  Inflammation/pain control; with cold, heat, elevation and/or liniments discussed as appropriate  Cortisone Injection. See procedure note.  PT referral.  HOME EXERCISE/PT program encouraged  Handouts given for home physical therapy exercises  TOTAL JOINT recommendations and precautions, including antibiotic prophylaxis discussed. Advice given and questions answered.   MEDICAL RECORDS reviewed from other provider(s) for past and current medical history pertinent to this complaint.  Total Knee Replacement : Continuation of conservative management vs. TKA: I reviewed anatomy of a total knee arthroplasty in laymen's terms, as well as typical postoperative recovery and possibly 6-12 months for maximal recovery, and possible need for rehabilitation stay after hospitalization. We also discussed risks, benefits, alternatives, and limitations of procedure with risks including but not limited to neurovascular damage, bleeding, infection, malalignment, chronic pain, failure of implants, osteolysis, loosening of implants, loss of motion, weakness, stiffness, instability, DVT, pulmonary embolus, death, stroke, complex regional pain syndrome, myocardial infarction, and need for additional procedures. Concept of substitution vs. replacement discussed.  No guarantees were given regarding results of surgery.  Patient verbalized understanding, and was given the opportunity to ask and have all questions answered today.   Discussed total joint replacement I reviewed anatomy of a total joint arthroplasty in laymen's terms, as well as typical postoperative recovery and possibly 6-12 months for maximal recovery, and possible need for rehabilitation  stay after hospitalization. We also discussed risks, benefits, alternatives, and limitations of procedure with risks including but not limited to neurovascular damage, bleeding, infection, malalignment, chronic pain, failure of implants, osteolysis, loosening of implants, loss of motion, weakness, stiffness, instability, DVT, pulmonary embolus, death, stroke, complex regional pain syndrome, myocardial infarction, and need for additional procedures. Concept of substitution vs. replacement discussed.  No guarantees were given regarding results of surgery.  Patient verbalized understanding, and was given the opportunity to ask and have all questions answered today.      1/20/2022    Much of this encounter note is an electronic transcription/translation of spoken language to printed text. The electronic translation of spoken language may permit erroneous, or at times, nonsensical words or phrases to be inadvertently transcribed; Although I have reviewed the note for such errors, some may still exist

## 2022-01-26 ENCOUNTER — TELEPHONE (OUTPATIENT)
Dept: INTERNAL MEDICINE | Facility: CLINIC | Age: 82
End: 2022-01-26

## 2022-01-26 NOTE — TELEPHONE ENCOUNTER
Caller: Margaret Gu    Relationship: Self    Best call back number: [phone]    What is the best time to reach you: ANY    Who are you requesting to speak with (clinical staff, provider,  specific staff member): CLINICAL    What was the call regarding: PATIENT WAS TRYING TO REFILL HER PRESCRIPTION FOR PREDNISONE BUT IS CONFUSED ON THE MEDICATION INSTRUCTIONS. IT SAYS TO TAKE 1 TABLET WITH 10MG TABLET BUT THE TABLETS ARE FOR 5MG. NEEDS SOMEONE TO CALL HER BACK AND GO OVER THIS MEDICATION SO SHE KNOWS WHAT TO DO AND HOW TO ASK TO REFILL IT.     PRESCRIPTION WILL GET SENT TO [facility] - [facility] [address] [place] [address], [address] - [phone]  - [phone]   [phone]    Do you require a callback: YES

## 2022-01-27 DIAGNOSIS — E27.40 ADRENAL INSUFFICIENCY: ICD-10-CM

## 2022-01-27 DIAGNOSIS — E27.40 ADRENAL INSUFFICIENCY: Primary | ICD-10-CM

## 2022-01-27 RX ORDER — PREDNISONE 1 MG/1
5 TABLET ORAL DAILY
Qty: 90 TABLET | Refills: 1 | Status: SHIPPED | OUTPATIENT
Start: 2022-01-27 | End: 2022-05-19 | Stop reason: SDUPTHER

## 2022-01-27 RX ORDER — PREDNISONE 1 MG/1
5 TABLET ORAL DAILY
Qty: 90 TABLET | Refills: 1 | Status: SHIPPED | OUTPATIENT
Start: 2022-01-27 | End: 2022-01-27

## 2022-02-09 ENCOUNTER — TELEPHONE (OUTPATIENT)
Dept: ORTHOPEDIC SURGERY | Facility: CLINIC | Age: 82
End: 2022-02-09

## 2022-02-09 DIAGNOSIS — M17.11 PRIMARY OSTEOARTHRITIS OF RIGHT KNEE: Primary | ICD-10-CM

## 2022-02-09 DIAGNOSIS — M25.561 RIGHT KNEE PAIN, UNSPECIFIED CHRONICITY: ICD-10-CM

## 2022-02-09 NOTE — TELEPHONE ENCOUNTER
Pt  would like a phone call back appt appt on 3/3 coming up with tha horn     Pt phone number is correct in chart   Stated that he wanted to go over some confusion with the appt that was set up today

## 2022-03-01 RX ORDER — CITALOPRAM 20 MG/1
20 TABLET ORAL DAILY
Qty: 90 TABLET | Refills: 1 | Status: SHIPPED | OUTPATIENT
Start: 2022-03-01 | End: 2022-06-20

## 2022-03-01 RX ORDER — FUROSEMIDE 20 MG/1
20 TABLET ORAL DAILY
Qty: 90 TABLET | Refills: 3 | Status: SHIPPED | OUTPATIENT
Start: 2022-03-01 | End: 2022-06-21 | Stop reason: SDUPTHER

## 2022-03-01 RX ORDER — MONTELUKAST SODIUM 10 MG/1
10 TABLET ORAL NIGHTLY
Qty: 90 TABLET | Refills: 3 | Status: SHIPPED | OUTPATIENT
Start: 2022-03-01 | End: 2022-05-19 | Stop reason: SDUPTHER

## 2022-03-01 RX ORDER — POTASSIUM CHLORIDE 750 MG/1
10 TABLET, EXTENDED RELEASE ORAL DAILY
Qty: 90 TABLET | Refills: 3 | Status: SHIPPED | OUTPATIENT
Start: 2022-03-01 | End: 2022-06-13 | Stop reason: SDUPTHER

## 2022-03-03 ENCOUNTER — CLINICAL SUPPORT (OUTPATIENT)
Dept: ORTHOPEDIC SURGERY | Facility: CLINIC | Age: 82
End: 2022-03-03

## 2022-03-03 VITALS — WEIGHT: 179 LBS | BODY MASS INDEX: 32.94 KG/M2 | HEIGHT: 62 IN | TEMPERATURE: 96.9 F

## 2022-03-03 DIAGNOSIS — M17.11 PRIMARY OSTEOARTHRITIS OF RIGHT KNEE: ICD-10-CM

## 2022-03-03 PROCEDURE — 20610 DRAIN/INJ JOINT/BURSA W/O US: CPT | Performed by: NURSE PRACTITIONER

## 2022-03-03 NOTE — PATIENT INSTRUCTIONS
Sodium Hyaluronate intra-articular injection  What is this medicine?  SODIUM HYALURONATE (VICENTE bijal um davide al yoor ON ate) is used to treat pain in the knee due to osteoarthritis.  This medicine may be used for other purposes; ask your health care provider or pharmacist if you have questions.  COMMON BRAND NAME(S): Amvisc, DUROLANE, Euflexxa, GELSYN-3, Hyalgan, Hymovis, Monovisc, Orthovisc, Supartz, Supartz FX, TriVisc, VISCO  What should I tell my health care provider before I take this medicine?  They need to know if you have any of these conditions:  · bleeding disorders  · glaucoma  · infection in the knee joint  · skin conditions or sensitivity  · skin infection  · an unusual allergic reaction to sodium hyaluronate, other medicines, foods, dyes, or preservatives. Different brands of sodium hyaluronate contain different allergens. Some may contain egg. Talk to your doctor about your allergies to make sure that you get the right product.  · pregnant or trying to get pregnant  · breast-feeding  How should I use this medicine?  This medicine is for injection into the knee joint. It is given by a health care professional in a hospital or clinic setting.  Talk to your pediatrician regarding the use of this medicine in children. Special care may be needed.  Overdosage: If you think you have taken too much of this medicine contact a poison control center or emergency room at once.  NOTE: This medicine is only for you. Do not share this medicine with others.  What if I miss a dose?  This does not apply.  What may interact with this medicine?  Interactions are not expected.  This list may not describe all possible interactions. Give your health care provider a list of all the medicines, herbs, non-prescription drugs, or dietary supplements you use. Also tell them if you smoke, drink alcohol, or use illegal drugs. Some items may interact with your medicine.  What should I watch for while using this medicine?  Tell your  doctor or healthcare professional if your symptoms do not start to get better or if they get worse.  If receiving this medicine for osteoarthritis, limit your activity after you receive your injection. Avoid physical activity for 48 hours following your injection to keep your knee from swelling. Do not stand on your feet for more than 1 hour at a time during the first 48 hours following your injection. Ask your doctor or healthcare professional about when you can begin major physical activity again.  What side effects may I notice from receiving this medicine?  Side effects that you should report to your doctor or health care professional as soon as possible:  · allergic reactions like skin rash, itching or hives, swelling of the face, lips, or tongue  · dizziness  · facial flushing  · pain, tingling, numbness in the hands or feet  · vision changes if received this medicine during eye surgery  Side effects that usually do not require medical attention (report to your doctor or health care professional if they continue or are bothersome):  · back pain  · bruising at site where injected  · chills  · diarrhea  · fever  · headache  · joint pain  · joint stiffness  · joint swelling  · muscle cramps  · muscle pain  · nausea, vomiting  · pain, redness, or irritation at site where injected  · weak or tired  This list may not describe all possible side effects. Call your doctor for medical advice about side effects. You may report side effects to FDA at 5-241-FDA-7729.  Where should I keep my medicine?  This drug is given in a hospital or clinic and will not be stored at home.  NOTE: This sheet is a summary. It may not cover all possible information. If you have questions about this medicine, talk to your doctor, pharmacist, or health care provider.  © 2021 Elsevier/Gold Standard (2017-01-19 08:34:51)

## 2022-03-03 NOTE — PROGRESS NOTES
Patient Name: Margaret Gu   YOB: 1940  Referring Primary Care Physician: Juliane Dye MD  BMI: Body mass index is 32.74 kg/m².    Chief Complaint:    Chief Complaint   Patient presents with   • Right Knee - Follow-up, Injections        HPI: Here for right knee pain and hs az and desired conservative treatment and here for gel injections    Margaret Gu is a 81 y.o. female who presents today for evaluation of   Chief Complaint   Patient presents with   • Right Knee - Follow-up, Injections         Subjective   Medications:   Home Medications:  Current Outpatient Medications on File Prior to Visit   Medication Sig   • citalopram (CeleXA) 20 MG tablet Take 1 tablet by mouth Daily.   • donepezil (Aricept) 10 MG tablet Take 1 tablet by mouth Every Night.   • DULoxetine (CYMBALTA) 60 MG capsule Take 1 capsule by mouth Daily.   • furosemide (LASIX) 20 MG tablet Take 1 tablet by mouth Daily.   • Magnesium 500 MG tablet Take 500 mg by mouth Daily.   • memantine (NAMENDA) 5 MG tablet 1 tab daily for 1 week, then 1 tab twice daily for 1 week, then 1 tab in the morning and 2 tabs at night for 1 week, then 2 tabs twice daily   • metoprolol succinate XL (TOPROL-XL) 100 MG 24 hr tablet Take 1 tablet by mouth Daily.   • montelukast (SINGULAIR) 10 MG tablet Take 1 tablet by mouth Every Night.   • potassium chloride (KLOR-CON) 10 MEQ CR tablet Take 1 tablet by mouth Daily.   • predniSONE (DELTASONE) 5 MG tablet Take 1 tablet by mouth Daily.   • traZODone (DESYREL) 50 MG tablet Take 0.5 tablets by mouth Every Night.   • VITAMIN D PO Take 1,500 mg by mouth Daily.   • warfarin (COUMADIN) 5 MG tablet Take 1 tablet by mouth Every Night.     Current Facility-Administered Medications on File Prior to Visit   Medication   • cyanocobalamin injection 1,000 mcg     Current Medications:  Scheduled Meds:cyanocobalamin, 1,000 mcg, Intramuscular, Q28 Days      Continuous Infusions:   PRN Meds:.    I have  reviewed the patient's medical history in detail and updated the computerized patient record.  Review and summarization of old records includes:    Past Medical History:   Diagnosis Date   • Adrenal insufficiency (HCC)    • Anxiety    • Arthritis    • Asthma    • Atrial fibrillation (HCC)    • CHF (congestive heart failure) (HCC)    • Colon tumor    • COPD (chronic obstructive pulmonary disease) (HCC)    • Dementia (HCC)    • Depression    • Depression    • Difficulty walking    • GERD (gastroesophageal reflux disease)    • Goiter    • Headache, tension-type    • Hypertension    • Hypocalcemia    • Hypokalemia    • Memory loss    • Migraine    • Mild cognitive impairment    • Sleep apnea    • Valvular heart disease         Past Surgical History:   Procedure Laterality Date   • APPENDECTOMY     • BACK SURGERY      6 back surgeries   • BREAST SURGERY     • CARDIAC ABLATION     • CARDIAC ABLATION  2013   • CATARACT EXTRACTION, BILATERAL Bilateral    • CHOLECYSTECTOMY     • HYSTERECTOMY     • JOINT REPLACEMENT Bilateral     shoulder   • MITRAL VALVE REPAIR/REPLACEMENT  2011   • OOPHORECTOMY     • TONSILLECTOMY AND ADENOIDECTOMY     • TRICUSPID VALVE SURGERY  2011    Repair        Social History     Occupational History   • Not on file   Tobacco Use   • Smoking status: Never Smoker   • Smokeless tobacco: Never Used   Vaping Use   • Vaping Use: Never used   Substance and Sexual Activity   • Alcohol use: Not Currently   • Drug use: Never   • Sexual activity: Defer      Social History     Social History Narrative   • Not on file        Family History   Problem Relation Age of Onset   • Dementia Mother    • Cancer Father        ROS: 14 point review of systems was performed and all other systems were reviewed and are negative except for documented findings in HPI and today's encounter.     Allergies:   Allergies   Allergen Reactions   • Chlorhexidine Rash and Unknown - Low Severity     Very red skin INCLUDING CHORAPREP!!.  "Patient states she had no problem with CHG solution or CHG wipes  for surgery.  Red and hot   • Dilaudid [Hydromorphone Hcl] Anaphylaxis   • Ace Inhibitors Cough   • Bactrim [Sulfamethoxazole-Trimethoprim] Hives and Itching   • Chloraprep One Step [Chlorhexidine Gluconate] Rash     Red and hot   • Ciprofloxacin Rash   • Codeine Unknown (See Comments)     Unknown     • Keflex [Cephalexin] Unknown (See Comments)     Unknown; pt tolerated ceftriaxone on 10/22/2020     • Latex Rash   • Penicillins Rash     Rash required tx with \"pill\" doctor prescribed but no ED visit; pt tolerated ceftriaxone on 10/22/2020       Constitutional:  Denies fever, shaking or chills   Eyes:  Denies change in visual acuity   HENT:  Denies nasal congestion or sore throat   Respiratory:  Denies cough or shortness of breath   Cardiovascular:  Denies chest pain or severe LE edema   GI:  Denies abdominal pain, nausea, vomiting, bloody stools or diarrhea   Musculoskeletal:  Numbness, tingling, pain, or loss of motor function only as noted above in history of present illness.  : Denies painful urination or hematuria  Integument:  Denies rash, lesion or ulceration   Neurologic:  Denies headache or focal weakness  Endocrine:  Denies lymphadenopathy  Psych:  Denies confusion or change in mental status   Hem:  Denies active bleeding    OBJECTIVE:  Physical Exam: 81 y.o. female  Wt Readings from Last 3 Encounters:   03/03/22 81.2 kg (179 lb)   01/20/22 78 kg (172 lb)   11/16/21 81.2 kg (179 lb)     Ht Readings from Last 1 Encounters:   03/03/22 157.5 cm (62\")     Body mass index is 32.74 kg/m².  Vitals:    03/03/22 1032   Temp: 96.9 °F (36.1 °C)     Vital signs reviewed.     General Appearance:    Alert, cooperative, in no acute distress                  Eyes: conjunctiva clear  ENT: external ears and nose atraumatic  CV: no peripheral edema  Resp: normal respiratory effort  Skin: no rashes or wounds; normal turgor  Psych: mood and affect " appropriate  Lymph: no nodes appreciated  Neuro: gross sensation intact  Vascular:  Palpable peripheral pulse in noted extremity  Musculoskeletal Extremities: Skin is warm dry intact with good pulses and sensation right knee has medial joint line tenderness with effusion and also lateral effusion patella femoral crepitation and calves are soft and nontender bilaterally ambulating with a cane       Radiology:   12/27/21 reviewed djd         Assessment:     ICD-10-CM ICD-9-CM   1. Primary osteoarthritis of right knee  M17.11 715.16        MDM/Plan:   The diagnosis(es), natural history, pathophysiology and treatment for diagnosis(es) were discussed. Opportunity given and questions answered.  Biomechanics of pertinent body areas discussed.  When appropriate, the use of ambulatory aids discussed.    The diagnosis(es), natural history, pathophysiology and treatment for diagnosis(es) were discussed. Opportunity given and questions answered.  Biomechanics of pertinent body areas discussed.  When appropriate, the use of ambulatory aids discussed.  EXERCISES:  Advice on benefits of, and types of regular/moderate exercise pertaining to orthopedic diagnosis(es).  MEDICATIONS:  The risks, benefits, warnings,side effects and alternatives of medications discussed.  Inflammation/pain control; with cold, heat, elevation and/or liniments discussed as appropriate  MEDICAL RECORDS reviewed from other provider(s) for past and current medical history pertinent to this complaint.  Viscosupplementation.  See procedure note.     Large Joint Arthrocentesis: R knee  Date/Time: 3/3/2022 10:33 AM  Consent given by: patient  Site marked: site marked  Timeout: Immediately prior to procedure a time out was called to verify the correct patient, procedure, equipment, support staff and site/side marked as required   Supporting Documentation  Indications: pain   Procedure Details  Location: knee - R knee  Preparation: Patient was prepped and draped in  the usual sterile fashion  Needle gauge: 21G.  Approach: lateral  Medications administered: 4 mL lidocaine (cardiac); 60 mg Sodium Hyaluronate 60 MG/3ML  Patient tolerance: patient tolerated the procedure well with no immediate complications              3/3/2022    Dictated utilizing Dragon dictation

## 2022-03-09 ENCOUNTER — CLINICAL SUPPORT (OUTPATIENT)
Dept: CARDIOLOGY | Facility: CLINIC | Age: 82
End: 2022-03-09

## 2022-03-09 DIAGNOSIS — I48.19 PERSISTENT ATRIAL FIBRILLATION: Primary | ICD-10-CM

## 2022-03-09 LAB — INR PPP: 2.3 (ref 2–3)

## 2022-03-09 PROCEDURE — 85610 PROTHROMBIN TIME: CPT | Performed by: INTERNAL MEDICINE

## 2022-03-09 PROCEDURE — 36416 COLLJ CAPILLARY BLOOD SPEC: CPT | Performed by: INTERNAL MEDICINE

## 2022-03-28 ENCOUNTER — TELEPHONE (OUTPATIENT)
Dept: CARDIOLOGY | Facility: CLINIC | Age: 82
End: 2022-03-28

## 2022-03-28 DIAGNOSIS — I48.0 PAROXYSMAL ATRIAL FIBRILLATION: ICD-10-CM

## 2022-03-28 RX ORDER — WARFARIN SODIUM 5 MG/1
5 TABLET ORAL NIGHTLY
Qty: 90 TABLET | Refills: 1 | Status: SHIPPED | OUTPATIENT
Start: 2022-03-28 | End: 2022-06-13 | Stop reason: SDUPTHER

## 2022-03-28 NOTE — TELEPHONE ENCOUNTER
3/28/22-Dr Martínez Lizama  Pt: Margaret Gu  :1940  Phone: [phone]  Reason for Call:  Pt. Needs refill on her Warfarin, 5mg. Please call this to her mail in pharmacy:   [facility]  [phone]

## 2022-04-06 ENCOUNTER — CLINICAL SUPPORT (OUTPATIENT)
Dept: CARDIOLOGY | Facility: CLINIC | Age: 82
End: 2022-04-06

## 2022-04-06 DIAGNOSIS — I48.91 ATRIAL FIBRILLATION, UNSPECIFIED TYPE: Primary | ICD-10-CM

## 2022-04-06 LAB — INR PPP: 2.5 (ref 2–3)

## 2022-04-06 PROCEDURE — 85610 PROTHROMBIN TIME: CPT | Performed by: INTERNAL MEDICINE

## 2022-04-06 PROCEDURE — 36416 COLLJ CAPILLARY BLOOD SPEC: CPT | Performed by: INTERNAL MEDICINE

## 2022-05-02 ENCOUNTER — OFFICE VISIT (OUTPATIENT)
Dept: INTERNAL MEDICINE | Facility: CLINIC | Age: 82
End: 2022-05-02

## 2022-05-02 VITALS
TEMPERATURE: 97.3 F | SYSTOLIC BLOOD PRESSURE: 140 MMHG | BODY MASS INDEX: 32.39 KG/M2 | WEIGHT: 176 LBS | HEIGHT: 62 IN | HEART RATE: 74 BPM | DIASTOLIC BLOOD PRESSURE: 66 MMHG

## 2022-05-02 DIAGNOSIS — G31.84 MILD COGNITIVE IMPAIRMENT: Primary | ICD-10-CM

## 2022-05-02 DIAGNOSIS — R79.89 LOW TSH LEVEL: ICD-10-CM

## 2022-05-02 DIAGNOSIS — R07.89 PAIN OF STERNUM: ICD-10-CM

## 2022-05-02 DIAGNOSIS — E27.40 ADRENAL INSUFFICIENCY: ICD-10-CM

## 2022-05-02 DIAGNOSIS — Z00.00 MEDICARE ANNUAL WELLNESS VISIT, SUBSEQUENT: ICD-10-CM

## 2022-05-02 PROCEDURE — G0439 PPPS, SUBSEQ VISIT: HCPCS | Performed by: INTERNAL MEDICINE

## 2022-05-02 PROCEDURE — 96160 PT-FOCUSED HLTH RISK ASSMT: CPT | Performed by: INTERNAL MEDICINE

## 2022-05-02 PROCEDURE — 99214 OFFICE O/P EST MOD 30 MIN: CPT | Performed by: INTERNAL MEDICINE

## 2022-05-02 PROCEDURE — 1170F FXNL STATUS ASSESSED: CPT | Performed by: INTERNAL MEDICINE

## 2022-05-02 PROCEDURE — 1159F MED LIST DOCD IN RCRD: CPT | Performed by: INTERNAL MEDICINE

## 2022-05-02 PROCEDURE — 1125F AMNT PAIN NOTED PAIN PRSNT: CPT | Performed by: INTERNAL MEDICINE

## 2022-05-02 NOTE — PROGRESS NOTES
"The ABCs of the Annual Wellness Visit  Subsequent Medicare Wellness Visit    Chief Complaint   Patient presents with   • Medicare Wellness-subsequent      Subjective    History of Present Illness:  Margaret Gu is a 81 y.o. female who presents for a Subsequent Medicare Wellness Visit.  Here with - stays she getting more dizzy spells- can be any time- she can drink her coffee- the headache and \"dizzy\" are described as a pressure at the top of her head.  Neurology last year thought she needed vestibular rehab but she really can't participate with her memory issues- doesn't remember to do the exercises.   Soreness at the top of sternum,  says she complains about it all of the time. Constantly rubs.    The following portions of the patient's history were reviewed and   updated as appropriate: allergies, current medications, past family history, past medical history, past social history, past surgical history and problem list.    Compared to one year ago, the patient feels her physical   health is worse.    Compared to one year ago, the patient feels her mental   health is worse.    Recent Hospitalizations:  She was admitted within the past 365 days at [address] [place] [address].       Current Medical Providers:  Patient Care Team:  Juliane Dye MD as PCP - General (Internal Medicine)  Stephany Lizama MD as Consulting Physician (Cardiology)  Kieran Curiel MD as Consulting Physician (Endocrinology)    Outpatient Medications Prior to Visit   Medication Sig Dispense Refill   • citalopram (CeleXA) 20 MG tablet Take 1 tablet by mouth Daily. 90 tablet 1   • donepezil (Aricept) 10 MG tablet Take 1 tablet by mouth Every Night. 90 tablet 3   • DULoxetine (CYMBALTA) 60 MG capsule Take 1 capsule by mouth Daily. 90 capsule 1   • furosemide (LASIX) 20 MG tablet Take 1 tablet by mouth Daily. 90 tablet 3   • Magnesium 500 MG tablet Take 500 mg by mouth Daily. 30 tablet 3   • memantine (NAMENDA) 5 MG tablet 1 " tab daily for 1 week, then 1 tab twice daily for 1 week, then 1 tab in the morning and 2 tabs at night for 1 week, then 2 tabs twice daily 120 tablet 0   • metoprolol succinate XL (TOPROL-XL) 100 MG 24 hr tablet Take 1 tablet by mouth Daily. 90 tablet 1   • montelukast (SINGULAIR) 10 MG tablet Take 1 tablet by mouth Every Night. 90 tablet 3   • potassium chloride (KLOR-CON) 10 MEQ CR tablet Take 1 tablet by mouth Daily. 90 tablet 3   • predniSONE (DELTASONE) 5 MG tablet Take 1 tablet by mouth Daily. 90 tablet 1   • traZODone (DESYREL) 50 MG tablet Take 0.5 tablets by mouth Every Night. 15 tablet 0   • VITAMIN D PO Take 1,500 mg by mouth Daily.     • warfarin (COUMADIN) 5 MG tablet Take 1 tablet by mouth Every Night. 90 tablet 1     Facility-Administered Medications Prior to Visit   Medication Dose Route Frequency Provider Last Rate Last Admin   • cyanocobalamin injection 1,000 mcg  1,000 mcg Intramuscular Q28 Days Sena Pedersen APRN   1,000 mcg at 08/19/21 1014       No opioid medication identified on active medication list. I have reviewed chart for other potential  high risk medication/s and harmful drug interactions in the elderly.          Aspirin is not on active medication list.  Aspirin use is not indicated based on review of current medical condition/s. Risk of harm outweighs potential benefits.  .    Patient Active Problem List   Diagnosis   • Adrenal insufficiency (HCC)   • PVCs (premature ventricular contractions)   • Essential hypertension   • Depression   • Valvular heart disease   • Mild cognitive impairment   • Chronic paroxysmal hemicrania, not intractable   • Atrial fibrillation (HCC)   • Iatrogenic hyperthyroidism   • GERD (gastroesophageal reflux disease)   • COPD (chronic obstructive pulmonary disease) (HCC)   • History of mitral valve repair   • History of tricuspid valve repair   • History of cardiac radiofrequency ablation   • History of cardiac catheterization   • LV dysfunction   •  "B12 deficiency     Advance Care Planning  Advance Directive is not on file.  ACP discussion was held with the patient during this visit. Patient has an advance directive (not in EMR), copy requested.    Review of Systems   HENT: Negative for trouble swallowing.    Respiratory: Negative for chest tightness and shortness of breath.    Cardiovascular: Negative for chest pain, palpitations and leg swelling.   Genitourinary: Negative for difficulty urinating.   Musculoskeletal: Negative for arthralgias and back pain.   Neurological: Positive for dizziness. Negative for speech difficulty and weakness.   Psychiatric/Behavioral: Negative for self-injury and sleep disturbance. Dysphoric mood: per .        Objective    Vitals:    05/02/22 1253   BP: 140/66   Pulse: 74   Temp: 97.3 °F (36.3 °C)   Weight: 79.8 kg (176 lb)   Height: 157.5 cm (62\")     BMI Readings from Last 1 Encounters:   05/02/22 32.19 kg/m²   BMI is above normal parameters. Recommendations include: no intervention    Does the patient have evidence of cognitive impairment? Yes    Physical Exam  Constitutional:       General: She is not in acute distress.     Appearance: Normal appearance. She is not ill-appearing.   HENT:      Nose: No congestion or rhinorrhea.      Mouth/Throat:      Pharynx: Oropharynx is clear.   Eyes:      Conjunctiva/sclera:      Left eye: Hemorrhage present.   Cardiovascular:      Rate and Rhythm: Normal rate. Rhythm irregular.   Pulmonary:      Effort: Pulmonary effort is normal.      Breath sounds: Normal breath sounds.   Chest:       Musculoskeletal:      Right lower leg: No edema.      Left lower leg: No edema.   Lymphadenopathy:      Cervical: No cervical adenopathy.   Neurological:      Mental Status: She is oriented to person, place, and time.      Comments: She does repeat her stories during exam     Psychiatric:         Mood and Affect: Mood normal.         Thought Content: Thought content normal.                 HEALTH " RISK ASSESSMENT    Smoking Status:  Social History     Tobacco Use   Smoking Status Never Smoker   Smokeless Tobacco Never Used     Alcohol Consumption:  Social History     Substance and Sexual Activity   Alcohol Use Not Currently     Fall Risk Screen:    TRINIADI Fall Risk Assessment was completed, and patient is at MODERATE risk for falls. Assessment completed on:5/2/2022    Depression Screening:  PHQ-2/PHQ-9 Depression Screening 5/2/2022   Retired Total Score -   Little Interest or Pleasure in Doing Things 1-->several days   Feeling Down, Depressed or Hopeless 1-->several days   Trouble Falling or Staying Asleep, or Sleeping Too Much 0-->not at all   Feeling Tired or Having Little Energy 1-->several days   Poor Appetite or Overeating 0-->not at all   Feeling Bad about Yourself - or that You are a Failure or Have Let Yourself or Your Family Down 0-->not at all   Trouble Concentrating on Things, Such as Reading the Newspaper or Watching Television 1-->several days   Moving or Speaking So Slowly that Other People Could Have Noticed? Or the Opposite - Being So Fidgety 0-->not at all   Thoughts that You Would be Better Off Dead or of Hurting Yourself in Some Way 0-->not at all   PHQ-9: Brief Depression Severity Measure Score 4       Health Habits and Functional and Cognitive Screening:  Functional & Cognitive Status 5/2/2022   Do you have difficulty preparing food and eating? No   Do you have difficulty bathing yourself, getting dressed or grooming yourself? No   Do you have difficulty using the toilet? No   Do you have difficulty moving around from place to place? Yes   Do you have trouble with steps or getting out of a bed or a chair? Yes   Current Diet Well Balanced Diet   Dental Exam Up to date   Eye Exam Up to date   Exercise (times per week) 0 times per week   Current Exercises Include No Regular Exercise   Do you need help using the phone?  Yes   Are you deaf or do you have serious difficulty hearing?  No   Do  you need help with transportation? Yes   Do you need help shopping? Yes   Do you need help preparing meals?  Yes   Do you need help with housework?  Yes   Do you need help with laundry? Yes   Do you need help taking your medications? Yes   Do you need help managing money? Yes   Do you ever drive or ride in a car without wearing a seat belt? No   Have you felt unusual stress, anger or loneliness in the last month? No   Who do you live with? Spouse   If you need help, do you have trouble finding someone available to you? No   Have you been bothered in the last four weeks by sexual problems? No   Do you have difficulty concentrating, remembering or making decisions? Yes       Age-appropriate Screening Schedule:  Refer to the list below for future screening recommendations based on patient's age, sex and/or medical conditions. Orders for these recommended tests are listed in the plan section. The patient has been provided with a written plan.    Health Maintenance   Topic Date Due   • DXA SCAN  Never done   • TDAP/TD VACCINES (1 - Tdap) Never done   • ZOSTER VACCINE (1 of 2) Never done   • INFLUENZA VACCINE  08/01/2022              Assessment/Plan   CMS Preventative Services Quick Reference  Risk Factors Identified During Encounter  Dementia/Memory   Depression/Dysphoria  Inactivity/Sedentary  The above risks/problems have been discussed with the patient.  Follow up actions/plans if indicated are seen below in the Assessment/Plan Section.  Pertinent information has been shared with the patient in the After Visit Summary.    Diagnoses and all orders for this visit:    1. Mild cognitive impairment (Primary)  Comments:  biggest issue- I think she repeats all day, not remembering.  continue same meds for now.     2. Adrenal insufficiency (HCC)  Comments:  no evidence of problems  Orders:  -     CBC & Differential  -     Comprehensive Metabolic Panel    3. Low TSH level  Comments:  check TSH today, could be related to her  fatigue.  She was encouraged to increase physical activity.   Orders:  -     TSH    4. Pain of sternum  Comments:  check u/s  Orders:  -     US Head Neck Soft Tissue; Future    5. Medicare annual wellness visit, subsequent  Comments:  she is not josé to have further testing ie dexa - immunizations reviewed.         Follow Up:   Return in about 6 weeks (around 6/13/2022) for Lab Today, Recheck.     An After Visit Summary and PPPS were made available to the patient.        I spent 44 minutes caring for Margaret on this date of service. This time includes time spent by me in the following activities:reviewing tests, obtaining and/or reviewing a separately obtained history and counseling and educating the patient/family/caregiver

## 2022-05-03 LAB
ALBUMIN SERPL-MCNC: 4.1 G/DL (ref 3.6–4.6)
ALBUMIN/GLOB SERPL: 1.9 {RATIO} (ref 1.2–2.2)
ALP SERPL-CCNC: 83 IU/L (ref 44–121)
ALT SERPL-CCNC: 19 IU/L (ref 0–32)
AST SERPL-CCNC: 25 IU/L (ref 0–40)
BASOPHILS # BLD AUTO: 0.1 X10E3/UL (ref 0–0.2)
BASOPHILS NFR BLD AUTO: 1 %
BILIRUB SERPL-MCNC: 1.8 MG/DL (ref 0–1.2)
BUN SERPL-MCNC: 13 MG/DL (ref 8–27)
BUN/CREAT SERPL: 12 (ref 12–28)
CALCIUM SERPL-MCNC: 9.3 MG/DL (ref 8.7–10.3)
CHLORIDE SERPL-SCNC: 103 MMOL/L (ref 96–106)
CO2 SERPL-SCNC: 26 MMOL/L (ref 20–29)
CREAT SERPL-MCNC: 1.08 MG/DL (ref 0.57–1)
EGFRCR SERPLBLD CKD-EPI 2021: 51 ML/MIN/1.73
EOSINOPHIL # BLD AUTO: 0.1 X10E3/UL (ref 0–0.4)
EOSINOPHIL NFR BLD AUTO: 1 %
ERYTHROCYTE [DISTWIDTH] IN BLOOD BY AUTOMATED COUNT: 14.6 % (ref 11.7–15.4)
GLOBULIN SER CALC-MCNC: 2.2 G/DL (ref 1.5–4.5)
GLUCOSE SERPL-MCNC: 79 MG/DL (ref 65–99)
HCT VFR BLD AUTO: 40.3 % (ref 34–46.6)
HGB BLD-MCNC: 13.3 G/DL (ref 11.1–15.9)
IMM GRANULOCYTES # BLD AUTO: 0.1 X10E3/UL (ref 0–0.1)
IMM GRANULOCYTES NFR BLD AUTO: 1 %
LYMPHOCYTES # BLD AUTO: 1.3 X10E3/UL (ref 0.7–3.1)
LYMPHOCYTES NFR BLD AUTO: 13 %
MCH RBC QN AUTO: 31.7 PG (ref 26.6–33)
MCHC RBC AUTO-ENTMCNC: 33 G/DL (ref 31.5–35.7)
MCV RBC AUTO: 96 FL (ref 79–97)
MONOCYTES # BLD AUTO: 0.9 X10E3/UL (ref 0.1–0.9)
MONOCYTES NFR BLD AUTO: 10 %
NEUTROPHILS # BLD AUTO: 7.2 X10E3/UL (ref 1.4–7)
NEUTROPHILS NFR BLD AUTO: 74 %
PLATELET # BLD AUTO: 157 X10E3/UL (ref 150–450)
POTASSIUM SERPL-SCNC: 4.1 MMOL/L (ref 3.5–5.2)
PROT SERPL-MCNC: 6.3 G/DL (ref 6–8.5)
RBC # BLD AUTO: 4.19 X10E6/UL (ref 3.77–5.28)
SODIUM SERPL-SCNC: 145 MMOL/L (ref 134–144)
TSH SERPL DL<=0.005 MIU/L-ACNC: 1.54 UIU/ML (ref 0.45–4.5)
WBC # BLD AUTO: 9.7 X10E3/UL (ref 3.4–10.8)

## 2022-05-04 LAB — INR PPP: 3.1

## 2022-05-05 ENCOUNTER — ANTICOAGULATION VISIT (OUTPATIENT)
Dept: CARDIOLOGY | Facility: CLINIC | Age: 82
End: 2022-05-05

## 2022-05-09 ENCOUNTER — TELEPHONE (OUTPATIENT)
Dept: INTERNAL MEDICINE | Facility: CLINIC | Age: 82
End: 2022-05-09

## 2022-05-09 NOTE — TELEPHONE ENCOUNTER
Patient's daughter-Guerita, wants to know why Mother is having an: US/Head & Neck soft tissue, please advise [phone].

## 2022-05-10 NOTE — TELEPHONE ENCOUNTER
LMTC    IF daughter calls unable to give information due to not being on HIPPA Form    OK for HUB to relay this message

## 2022-05-19 DIAGNOSIS — G31.84 MILD COGNITIVE IMPAIRMENT: Primary | ICD-10-CM

## 2022-05-19 DIAGNOSIS — E27.40 ADRENAL INSUFFICIENCY: ICD-10-CM

## 2022-05-19 RX ORDER — MONTELUKAST SODIUM 10 MG/1
10 TABLET ORAL NIGHTLY
Qty: 90 TABLET | Refills: 3 | Status: SHIPPED | OUTPATIENT
Start: 2022-05-19 | End: 2022-11-10

## 2022-05-19 RX ORDER — PREDNISONE 1 MG/1
5 TABLET ORAL DAILY
Qty: 90 TABLET | Refills: 1 | Status: SHIPPED | OUTPATIENT
Start: 2022-05-19 | End: 2022-10-03

## 2022-05-19 RX ORDER — MEMANTINE HYDROCHLORIDE 5 MG/1
TABLET ORAL
Qty: 120 TABLET | Refills: 1 | Status: SHIPPED | OUTPATIENT
Start: 2022-05-19 | End: 2022-06-01 | Stop reason: CLARIF

## 2022-05-20 ENCOUNTER — TELEPHONE (OUTPATIENT)
Dept: ORTHOPEDIC SURGERY | Facility: CLINIC | Age: 82
End: 2022-05-20

## 2022-05-20 NOTE — TELEPHONE ENCOUNTER
Called and spoke with patient instructed her to use ice and elevate the leg a when she is sitting to see if that helps with some inflammation. Can we see if Dr Garcia has a spot next week she can be seen. Looks like she has been seen for her right knee but not her left. Please call the patient and let her know of an appointment. Thanks!

## 2022-05-20 NOTE — TELEPHONE ENCOUNTER
Caller: PATIENT     Relationship to patient: SELF     Best call back number:  [phone]     Chief complaint: LEFT KNEE PAIN AND SWELLING FOR 2-3 DAYS     Type of visit: WORK IN     Requested date: ASAP     Additional notes: PT. OF [facility].   SHE STATES THAT HER LEFT KNEE HAS BEEN VERY PAINFUL AND SWOLLEN FOR 2-3 DAYS.   SHE IS HAVING TROUBLE WALKING.   ASKING IF [facility] IS OUT- CAN ANYONE ELSE IN OFFICE SEE HER SOON?  PLEASE CALL TO ADVISE.

## 2022-05-25 ENCOUNTER — OFFICE VISIT (OUTPATIENT)
Dept: ORTHOPEDIC SURGERY | Facility: CLINIC | Age: 82
End: 2022-05-25

## 2022-05-25 VITALS — TEMPERATURE: 97.5 F | BODY MASS INDEX: 32.39 KG/M2 | WEIGHT: 176 LBS | HEIGHT: 62 IN

## 2022-05-25 DIAGNOSIS — M17.0 ARTHRITIS OF BOTH KNEES: Primary | ICD-10-CM

## 2022-05-25 PROCEDURE — 99214 OFFICE O/P EST MOD 30 MIN: CPT | Performed by: ORTHOPAEDIC SURGERY

## 2022-05-25 PROCEDURE — 20610 DRAIN/INJ JOINT/BURSA W/O US: CPT | Performed by: ORTHOPAEDIC SURGERY

## 2022-05-25 PROCEDURE — 73562 X-RAY EXAM OF KNEE 3: CPT | Performed by: ORTHOPAEDIC SURGERY

## 2022-05-25 RX ORDER — METHYLPREDNISOLONE ACETATE 80 MG/ML
80 INJECTION, SUSPENSION INTRA-ARTICULAR; INTRALESIONAL; INTRAMUSCULAR; SOFT TISSUE
Status: COMPLETED | OUTPATIENT
Start: 2022-05-25 | End: 2022-05-25

## 2022-05-25 RX ORDER — LIDOCAINE HYDROCHLORIDE 10 MG/ML
9 INJECTION, SOLUTION EPIDURAL; INFILTRATION; INTRACAUDAL; PERINEURAL
Status: COMPLETED | OUTPATIENT
Start: 2022-05-25 | End: 2022-05-25

## 2022-05-25 RX ADMIN — METHYLPREDNISOLONE ACETATE 80 MG: 80 INJECTION, SUSPENSION INTRA-ARTICULAR; INTRALESIONAL; INTRAMUSCULAR; SOFT TISSUE at 11:27

## 2022-05-25 RX ADMIN — LIDOCAINE HYDROCHLORIDE 9 ML: 10 INJECTION, SOLUTION EPIDURAL; INFILTRATION; INTRACAUDAL; PERINEURAL at 11:27

## 2022-05-25 NOTE — PROGRESS NOTES
"Patient Name: Margaret Gu   YOB: 1940  Referring Primary Care Physician: Juliane Dye MD  BMI: Body mass index is 32.18 kg/m².    Chief Complaint:    Chief Complaint   Patient presents with   • Left Knee - Initial Evaluation        HPI:     Margaret Gu is a 81 y.o. female who presents today for evaluation of   Chief Complaint   Patient presents with   • Left Knee - Initial Evaluation   .  Patient is seen today with her .  She is complaining of left knee pain.  They were both  for 40 years before and then found each other it at grade school reunion and been  for 17.  She has had some knee problems for quite some time.  She had right knee problems and was seen by Eboni back in the fall injected with cortisone which did not help a lot although she was on warfarin she had some gel injected a couple months ago and is found that the right knee does not bother her but now the left is she is denies any kind of history of trauma for it she says she has a Baker's cyst and was told elsewhere \"needs to be drained\"      Subjective   Medications:   Home Medications:  Current Outpatient Medications on File Prior to Visit   Medication Sig   • citalopram (CeleXA) 20 MG tablet Take 1 tablet by mouth Daily.   • donepezil (Aricept) 10 MG tablet Take 1 tablet by mouth Every Night.   • DULoxetine (CYMBALTA) 60 MG capsule Take 1 capsule by mouth Daily.   • furosemide (LASIX) 20 MG tablet Take 1 tablet by mouth Daily.   • Magnesium 500 MG tablet Take 500 mg by mouth Daily.   • memantine (NAMENDA) 5 MG tablet 1 tab daily for 1 week, then 1 tab twice daily for 1 week, then 1 tab in the morning and 2 tabs at night for 1 week, then 2 tabs twice daily   • metoprolol succinate XL (TOPROL-XL) 100 MG 24 hr tablet Take 1 tablet by mouth Daily.   • montelukast (SINGULAIR) 10 MG tablet Take 1 tablet by mouth Every Night.   • potassium chloride (KLOR-CON) 10 MEQ CR tablet Take 1 tablet by " mouth Daily.   • predniSONE (DELTASONE) 5 MG tablet Take 1 tablet by mouth Daily.   • traZODone (DESYREL) 50 MG tablet Take 0.5 tablets by mouth Every Night.   • VITAMIN D PO Take 1,500 mg by mouth Daily.   • warfarin (COUMADIN) 5 MG tablet Take 1 tablet by mouth Every Night.     Current Facility-Administered Medications on File Prior to Visit   Medication   • cyanocobalamin injection 1,000 mcg     Current Medications:  Scheduled Meds:cyanocobalamin, 1,000 mcg, Intramuscular, Q28 Days      Continuous Infusions:   PRN Meds:.    I have reviewed the patient's medical history in detail and updated the computerized patient record.  Review and summarization of old records includes:    Past Medical History:   Diagnosis Date   • Adrenal insufficiency (HCC)    • Anxiety    • Arthritis    • Asthma    • Atrial fibrillation (HCC)    • CHF (congestive heart failure) (HCC)    • Colon tumor    • COPD (chronic obstructive pulmonary disease) (HCC)    • Dementia (HCC)    • Depression    • Depression    • Difficulty walking    • GERD (gastroesophageal reflux disease)    • Goiter    • Headache, tension-type    • Hypertension    • Hypocalcemia    • Hypokalemia    • Memory loss    • Migraine    • Mild cognitive impairment    • Sleep apnea    • Valvular heart disease         Past Surgical History:   Procedure Laterality Date   • APPENDECTOMY     • BACK SURGERY      6 back surgeries   • BREAST SURGERY     • CARDIAC ABLATION     • CARDIAC ABLATION  2013   • CATARACT EXTRACTION, BILATERAL Bilateral    • CHOLECYSTECTOMY     • HYSTERECTOMY     • JOINT REPLACEMENT Bilateral     shoulder   • MITRAL VALVE REPAIR/REPLACEMENT  2011   • OOPHORECTOMY     • TONSILLECTOMY AND ADENOIDECTOMY     • TRICUSPID VALVE SURGERY  2011    Repair        Social History     Occupational History   • Not on file   Tobacco Use   • Smoking status: Never Smoker   • Smokeless tobacco: Never Used   Vaping Use   • Vaping Use: Never used   Substance and Sexual Activity   •  "Alcohol use: Not Currently   • Drug use: Never   • Sexual activity: Defer      Social History     Social History Narrative   • Not on file        Family History   Problem Relation Age of Onset   • Dementia Mother    • Cancer Father        ROS: 14 point review of systems was performed and all other systems were reviewed and are negative except for documented findings in HPI and today's encounter.     Allergies:   Allergies   Allergen Reactions   • Chlorhexidine Rash and Unknown - Low Severity     Very red skin INCLUDING CHORAPREP!!. Patient states she had no problem with CHG solution or CHG wipes  for surgery.  Red and hot   • Dilaudid [Hydromorphone Hcl] Anaphylaxis   • Ace Inhibitors Cough   • Bactrim [Sulfamethoxazole-Trimethoprim] Hives and Itching   • Chloraprep One Step [Chlorhexidine Gluconate] Rash     Red and hot   • Ciprofloxacin Rash   • Codeine Unknown (See Comments)     Unknown     • Keflex [Cephalexin] Unknown (See Comments)     Unknown; pt tolerated ceftriaxone on 10/22/2020     • Latex Rash   • Penicillins Rash     Rash required tx with \"pill\" doctor prescribed but no ED visit; pt tolerated ceftriaxone on 10/22/2020       Constitutional:  Denies fever, shaking or chills   Eyes:  Denies change in visual acuity   HENT:  Denies nasal congestion or sore throat   Respiratory:  Denies cough or shortness of breath   Cardiovascular:  Denies chest pain or severe LE edema   GI:  Denies abdominal pain, nausea, vomiting, bloody stools or diarrhea   Musculoskeletal:  Numbness, tingling, pain, or loss of motor function only as noted above in history of present illness.  : Denies painful urination or hematuria  Integument:  Denies rash, lesion or ulceration   Neurologic:  Denies headache or focal weakness  Endocrine:  Denies lymphadenopathy  Psych:  Denies confusion or change in mental status   Hem:  Denies active bleeding    OBJECTIVE:  Physical Exam: 81 y.o. female  Wt Readings from Last 3 Encounters:   05/25/22 " "79.8 kg (176 lb)   05/02/22 79.8 kg (176 lb)   03/03/22 81.2 kg (179 lb)     Ht Readings from Last 1 Encounters:   05/25/22 157.5 cm (62.01\")     Body mass index is 32.18 kg/m².  Vitals:    05/25/22 1117   Temp: 97.5 °F (36.4 °C)     Vital signs reviewed.     General Appearance:    Alert, cooperative, in no acute distress                  Eyes: conjunctiva clear  ENT: external ears and nose atraumatic  CV: no peripheral edema  Resp: normal respiratory effort  Skin: no rashes or wounds; normal turgor  Psych: mood and affect appropriate  Lymph: no nodes appreciated  Neuro: gross sensation intact  Vascular:  Palpable peripheral pulse in noted extremity  Musculoskeletal Extremities: Exam today shows pleasant lady per her history she does have some cognitive decline but she is very pleasant and her  helps her out.  Left knee has some swelling synovitis stiffness a ghost about -10 and flexes back to maybe 100 joint line tenderness there is a Baker's cyst.  The right knee has crepitation throughout range of motion which is about the same as far as stiffness but is not as swollen as the left.  Uses a cane to ambulate    Radiology:   AP lateral 40 degree PA x-ray left knee taken the office today with partial comparison views from her right knee x-rays that are taken for pain show advanced arthritic change bilaterally with chondrocalcinosis this is all explained in layman's terms        Assessment:     ICD-10-CM ICD-9-CM   1. Arthritis of both knees  M17.0 716.96        MDM/Plan:   The diagnosis(es), natural history, pathophysiology and treatment for diagnosis(es) were discussed. Opportunity given and questions answered.  Biomechanics of pertinent body areas discussed.  When appropriate, the use of ambulatory aids discussed.    BMI:  The concept of BMI body mass index and its importance and implications discussed.    Inflammation/pain control; with cold, heat, elevation and/or liniments discussed as " appropriate  Cortisone Injection. See procedure note.  PT referral.  MEDICAL RECORDS reviewed from other provider(s) for past and current medical history pertinent to this complaint.  Recommend that she trial some physical therapy and will try an injection of cortisone in the left knee.  Had an appointment for about 6 to 8 weeks for recheck and see if she needs anything additional to went over all the different treatments in the scope of treatment    5/25/2022    Dictated utilizing Dragon dictation        Large Joint Arthrocentesis: L knee  Date/Time: 5/25/2022 11:27 AM  Consent given by: patient  Site marked: site marked  Timeout: Immediately prior to procedure a time out was called to verify the correct patient, procedure, equipment, support staff and site/side marked as required   Supporting Documentation  Indications: pain   Procedure Details  Location: knee - L knee  Preparation: Patient was prepped and draped in the usual sterile fashion  Needle gauge: 21.  Approach: anterolateral  Medications administered: 80 mg methylPREDNISolone acetate 80 MG/ML; 9 mL lidocaine PF 1% 1 %  Patient tolerance: patient tolerated the procedure well with no immediate complications

## 2022-06-01 ENCOUNTER — CLINICAL SUPPORT (OUTPATIENT)
Dept: CARDIOLOGY | Facility: CLINIC | Age: 82
End: 2022-06-01

## 2022-06-01 DIAGNOSIS — G31.84 MILD COGNITIVE IMPAIRMENT: ICD-10-CM

## 2022-06-01 DIAGNOSIS — I48.91 ATRIAL FIBRILLATION, UNSPECIFIED TYPE: Primary | ICD-10-CM

## 2022-06-01 LAB — INR PPP: 3.3 (ref 2–3)

## 2022-06-01 PROCEDURE — 85610 PROTHROMBIN TIME: CPT | Performed by: INTERNAL MEDICINE

## 2022-06-01 PROCEDURE — 36416 COLLJ CAPILLARY BLOOD SPEC: CPT | Performed by: INTERNAL MEDICINE

## 2022-06-01 RX ORDER — MEMANTINE HYDROCHLORIDE 5 MG/1
TABLET ORAL
Qty: 120 TABLET | Refills: 1 | Status: CANCELLED | OUTPATIENT
Start: 2022-06-01

## 2022-06-01 RX ORDER — MEMANTINE HYDROCHLORIDE 10 MG/1
10 TABLET ORAL 2 TIMES DAILY
Qty: 180 TABLET | Refills: 3 | Status: SHIPPED | OUTPATIENT
Start: 2022-06-01 | End: 2023-06-01

## 2022-06-01 NOTE — TELEPHONE ENCOUNTER
SPOKE WITH PATIENT'S  AND HE SAID THAT SHE WAS TAKING MEMANTINE 10 MG ONE PILL TWICE A DAY, HOWEVER, WE SENT WRONG SCRIPT TO PHARMACY    AS PER HER CHART, HER PCP REFILLED THIS MEDICATION 5.19.22 REPEATING TE TITRATION DOSE.    NEW SCRIPT SENT TO PHARMACY MEMANTINE 10 MG (1) BID

## 2022-06-01 NOTE — TELEPHONE ENCOUNTER
Caller: Chris Alfredo    Relationship: Emergency Contact    Best call back number: [phone]    Requested Prescriptions:   Requested Prescriptions     Pending Prescriptions Disp Refills   • memantine (NAMENDA) 5 MG tablet 120 tablet 1     Si tab daily for 1 week, then 1 tab twice daily for 1 week, then 1 tab in the morning and 2 tabs at night for 1 week, then 2 tabs twice daily        Pharmacy where request should be sent: [facility] - [address] - [phone] [facility] [phone]      Additional details provided by patient: PATIENTS SPOUSE STATES THEY ONLY RECEIVED A PORTION OF THE MEMANTINE YESTERDAY. SAID SHE ONLY  PILLS AND HE SAID SHE SHOULD HAVE 360 FOR 90 DAYS. PATIENT NEEDS 126 MORE TABLETS. CLAIM NUMBER PHARMACY GAVE PATIENT IS #[id number] PLEASE ADVISE.     Does the patient have less than a 3 day supply:  [] Yes  [] No    Virginia Ivey   22 13:21 EDT

## 2022-06-13 ENCOUNTER — TELEPHONE (OUTPATIENT)
Dept: INTERNAL MEDICINE | Facility: CLINIC | Age: 82
End: 2022-06-13

## 2022-06-13 ENCOUNTER — TELEPHONE (OUTPATIENT)
Dept: CARDIOLOGY | Facility: CLINIC | Age: 82
End: 2022-06-13

## 2022-06-13 DIAGNOSIS — I48.0 PAROXYSMAL ATRIAL FIBRILLATION: ICD-10-CM

## 2022-06-13 DIAGNOSIS — I48.91 ATRIAL FIBRILLATION, UNSPECIFIED TYPE: ICD-10-CM

## 2022-06-13 RX ORDER — WARFARIN SODIUM 5 MG/1
5 TABLET ORAL NIGHTLY
Qty: 90 TABLET | Refills: 3 | Status: SHIPPED | OUTPATIENT
Start: 2022-06-13 | End: 2022-06-21 | Stop reason: SDUPTHER

## 2022-06-13 RX ORDER — POTASSIUM CHLORIDE 750 MG/1
10 TABLET, EXTENDED RELEASE ORAL DAILY
Qty: 90 TABLET | Refills: 3 | Status: SHIPPED | OUTPATIENT
Start: 2022-06-13 | End: 2022-06-27 | Stop reason: SDUPTHER

## 2022-06-13 RX ORDER — METOPROLOL SUCCINATE 100 MG/1
100 TABLET, EXTENDED RELEASE ORAL DAILY
Qty: 90 TABLET | Refills: 3 | Status: SHIPPED | OUTPATIENT
Start: 2022-06-13 | End: 2022-06-21 | Stop reason: SDUPTHER

## 2022-06-13 NOTE — TELEPHONE ENCOUNTER
22-Dr Stephany Lizama  Pt: Margaret Gu  : 1940  Phone: [phone]  Reason for Call:  Pt. Needs 2 refills on meds.  1. Metoprolol Succ. XL 100mg tabs #90.  2. Warfarin 5mg tablets 90 day refill  Pt. Ask for 3 refills on each of thes meds.  Pharmacy: [facility]  Phone: [phone]  Fax: [phone]

## 2022-06-13 NOTE — TELEPHONE ENCOUNTER
Caller: Margaret Gu    Relationship: Self    Best call back number: [phone]    Requested Prescriptions:       potassium chloride (KLOR-CON) 10 MEQ CR tablet         Pharmacy where request should be sent:    [facility] (Now [facility]) - [facility] [address] [facility] - [phone] PH - [phone] FX  [phone]  Additional details provided by patient: PATIENT IS CALLING TO REQUEST A NEW 90 DAY PRESCRIPTION WITH REFILLS.  Does the patient have less than a 3 day supply:  [x] Yes  [] No    Virginia Hernandez Rep   06/13/22 10:20 EDT     PLEASE ADVISE.

## 2022-06-16 ENCOUNTER — HOSPITAL ENCOUNTER (OUTPATIENT)
Dept: ULTRASOUND IMAGING | Facility: HOSPITAL | Age: 82
Discharge: HOME | End: 2022-06-16
Admitting: INTERNAL MEDICINE

## 2022-06-16 DIAGNOSIS — R07.89 PAIN OF STERNUM: ICD-10-CM

## 2022-06-16 PROCEDURE — 76536 US EXAM OF HEAD AND NECK: CPT

## 2022-06-20 ENCOUNTER — OFFICE VISIT (OUTPATIENT)
Dept: INTERNAL MEDICINE | Facility: CLINIC | Age: 82
End: 2022-06-20

## 2022-06-20 VITALS
HEIGHT: 62 IN | SYSTOLIC BLOOD PRESSURE: 114 MMHG | BODY MASS INDEX: 32.57 KG/M2 | TEMPERATURE: 97.3 F | DIASTOLIC BLOOD PRESSURE: 58 MMHG | HEART RATE: 70 BPM | WEIGHT: 177 LBS

## 2022-06-20 DIAGNOSIS — R42 DIZZINESS: Primary | ICD-10-CM

## 2022-06-20 DIAGNOSIS — R42 VERTIGO: ICD-10-CM

## 2022-06-20 DIAGNOSIS — E53.8 B12 DEFICIENCY: ICD-10-CM

## 2022-06-20 DIAGNOSIS — G31.84 MILD COGNITIVE IMPAIRMENT: ICD-10-CM

## 2022-06-20 PROCEDURE — 99214 OFFICE O/P EST MOD 30 MIN: CPT | Performed by: INTERNAL MEDICINE

## 2022-06-20 NOTE — PROGRESS NOTES
"Chief Complaint  Memory Loss and Leg Swelling    Subjective        Margaret Gu presents to [facility]  History of Present Illness  Here with - has a few little complaints- doing some coughing, not necessarily while she's eating- has some vomiting most times after she eats, usually 1-2 hours later. Appears to be mainly undigested food.  Not always a large amt. Has been several weeks. Denies dysphagia, has tried to eat slowly. Weight is stable- she continues to eat 3 meals a day. More in the evening meals. She does a little better if eating smaller meals.   Seeing Dr. Garcia about knee OA.   Getting inj prn.  Chest still feels sore.  Has fallen if she gets up too quickly- she often forgets and gets very dizzy, lightheaded.   Last year complained of dysphagia- now the above- esophagram at that time was negative.  Her memory worsening- makes history taking and therapy more difficult.     Objective   Vital Signs:  /58   Pulse 70   Temp 97.3 °F (36.3 °C)   Ht 157.5 cm (62.01\")   Wt 80.3 kg (177 lb)   BMI 32.36 kg/m²   Estimated body mass index is 32.36 kg/m² as calculated from the following:    Height as of this encounter: 157.5 cm (62.01\").    Weight as of this encounter: 80.3 kg (177 lb).          Physical Exam  Constitutional:       Appearance: Normal appearance.   Cardiovascular:      Rate and Rhythm: Normal rate. Rhythm irregular.   Pulmonary:      Effort: Pulmonary effort is normal.      Breath sounds: Normal breath sounds.   Chest:      Chest wall: Tenderness present.   Abdominal:      Tenderness: There is no abdominal tenderness.   Musculoskeletal:      Right lower leg: Edema (trace) present.      Left lower leg: Left lower leg edema: trace.   Lymphadenopathy:      Cervical: No cervical adenopathy.   Skin:     General: Skin is warm and dry.   Neurological:      General: No focal deficit present.      Mental Status: Mental status is at baseline. "   Psychiatric:         Mood and Affect: Mood normal.        Result Review :  The following data was reviewed by: Juliane Dye MD on 06/20/2022:  Common labs    Common Labsle 5/2/22 5/2/22    1358 1358   Glucose  79   BUN  13   Creatinine  1.08 (A)   Sodium  145 (A)   Potassium  4.1   Chloride  103   Calcium  9.3   Total Protein  6.3   Albumin  4.1   Total Bilirubin  1.8 (A)   Alkaline Phosphatase  83   AST (SGOT)  25   ALT (SGPT)  19   WBC 9.7    Hemoglobin 13.3    Hematocrit 40.3    Platelets 157    (A) Abnormal value            Data reviewed: scans, consultants notes          Assessment and Plan   Diagnoses and all orders for this visit:    1. Dizziness (Primary)  Comments:  will try to decrease metoprolol to 50 mg daily- could help with orthostatic symptoms.     2. Vertigo  Comments:   thinks she may have had some vestibular rehab in the past that was helpful.  Will try again.     3. Mild cognitive impairment  Comments:  clearly progressive,  takes excellent care of her-     4. B12 deficiency  Comments:  getting regular inj at her assisted living.              Follow Up   Return in about 4 weeks (around 7/18/2022) for Recheck.  Patient was given instructions and counseling regarding her condition or for health maintenance advice. Please see specific information pulled into the AVS if appropriate.

## 2022-06-21 ENCOUNTER — TELEPHONE (OUTPATIENT)
Dept: INTERNAL MEDICINE | Facility: CLINIC | Age: 82
End: 2022-06-21

## 2022-06-21 ENCOUNTER — TELEPHONE (OUTPATIENT)
Dept: CARDIOLOGY | Facility: CLINIC | Age: 82
End: 2022-06-21

## 2022-06-21 DIAGNOSIS — I48.91 ATRIAL FIBRILLATION, UNSPECIFIED TYPE: ICD-10-CM

## 2022-06-21 DIAGNOSIS — I48.0 PAROXYSMAL ATRIAL FIBRILLATION: ICD-10-CM

## 2022-06-21 RX ORDER — METOPROLOL SUCCINATE 100 MG/1
100 TABLET, EXTENDED RELEASE ORAL DAILY
Qty: 90 TABLET | Refills: 3 | Status: SHIPPED | OUTPATIENT
Start: 2022-06-21 | End: 2022-06-21

## 2022-06-21 RX ORDER — FUROSEMIDE 20 MG/1
20 TABLET ORAL DAILY
Qty: 90 TABLET | Refills: 3 | Status: SHIPPED | OUTPATIENT
Start: 2022-06-21

## 2022-06-21 RX ORDER — WARFARIN SODIUM 5 MG/1
5 TABLET ORAL NIGHTLY
Qty: 90 TABLET | Refills: 1 | Status: SHIPPED | OUTPATIENT
Start: 2022-06-21

## 2022-06-21 RX ORDER — TRAZODONE HYDROCHLORIDE 50 MG/1
25 TABLET ORAL NIGHTLY
Qty: 45 TABLET | Refills: 3 | Status: SHIPPED | OUTPATIENT
Start: 2022-06-21 | End: 2022-09-01 | Stop reason: SDUPTHER

## 2022-06-21 RX ORDER — METOPROLOL SUCCINATE 100 MG/1
100 TABLET, EXTENDED RELEASE ORAL DAILY
Qty: 90 TABLET | Refills: 3 | Status: SHIPPED | OUTPATIENT
Start: 2022-06-21

## 2022-06-21 RX ORDER — WARFARIN SODIUM 5 MG/1
5 TABLET ORAL NIGHTLY
Qty: 90 TABLET | Refills: 1 | Status: SHIPPED | OUTPATIENT
Start: 2022-06-21 | End: 2022-06-21

## 2022-06-21 NOTE — TELEPHONE ENCOUNTER
Caller: Chris Alfredo    Relationship: Emergency Contact    Best call back number: [phone]    Requested Prescriptions:   Requested Prescriptions     Pending Prescriptions Disp Refills   • furosemide (LASIX) 20 MG tablet 90 tablet 3     Sig: Take 1 tablet by mouth Daily.   • traZODone (DESYREL) 50 MG tablet 15 tablet 0     Sig: Take 0.5 tablets by mouth Every Night.        Pharmacy where request should be sent: [facility] (NOW [facility]) - [address] - [phone]  - [phone] FX     Additional details provided by patient: PATIENT HAS APPROX ONE WEEK LEFT    PATIENT STATED USUALLY GETS A 90 DAYS SUPPLY WITH THREE REFILLS.    PATIENT STATED THAT SHE USUALLY GETS A 100 MG TABLET AND PATIENT CUTS THEM IN HALF.    Does the patient have less than a 3 day supply:  [x] Yes  [] No    Virginia Mills Rep   06/21/22 10:44 EDT

## 2022-06-21 NOTE — TELEPHONE ENCOUNTER
Patient's  called would like to know if refills can be sent to [facility], states they are no longer using [facility].

## 2022-06-21 NOTE — TELEPHONE ENCOUNTER
22-Dr Stephany Lizama  Pt: Margaret Gu  : 1940  Phone: [phone]  Reason for Call:  Pt. Needs 90 day refill on Warfarin, 5mg. Tablets and Metoprolol Succ. XL 100mg tablets #90. Pt. Is asking for 3 refills on each. Please send to [facility]. P: [phone]  F: [phone]

## 2022-06-27 RX ORDER — POTASSIUM CHLORIDE 750 MG/1
10 TABLET, EXTENDED RELEASE ORAL DAILY
Qty: 90 TABLET | Refills: 3 | Status: SHIPPED | OUTPATIENT
Start: 2022-06-27

## 2022-06-27 NOTE — TELEPHONE ENCOUNTER
Caller: Chris Alfredo    Relationship: Emergency Contact    Best call back number: [phone]    Requested Prescriptions:   Requested Prescriptions     Pending Prescriptions Disp Refills   • potassium chloride (KLOR-CON) 10 MEQ CR tablet 90 tablet 3     Sig: Take 1 tablet by mouth Daily.        Pharmacy where request should be sent: [facility] (NOW [facility]) - [place] [id number] - [phone]  - [phone] FX     Additional details provided by patient: THE PATIENT WILL NEED THIS SENT TO [facility] AND SHE NORMALLY RECEIVES A 90-DAY SUPPLY.    Does the patient have less than a 3 day supply:  [x] Yes  [] No    Virginia Perea Rep   06/27/22 12:04 EDT

## 2022-06-29 ENCOUNTER — CLINICAL SUPPORT (OUTPATIENT)
Dept: CARDIOLOGY | Facility: CLINIC | Age: 82
End: 2022-06-29

## 2022-06-29 DIAGNOSIS — I48.91 ATRIAL FIBRILLATION, UNSPECIFIED TYPE: Primary | ICD-10-CM

## 2022-06-29 LAB — INR PPP: 3.6 (ref 2–3)

## 2022-06-29 PROCEDURE — 85610 PROTHROMBIN TIME: CPT | Performed by: INTERNAL MEDICINE

## 2022-06-29 PROCEDURE — 36416 COLLJ CAPILLARY BLOOD SPEC: CPT | Performed by: INTERNAL MEDICINE

## 2022-07-13 ENCOUNTER — CLINICAL SUPPORT (OUTPATIENT)
Dept: CARDIOLOGY | Facility: CLINIC | Age: 82
End: 2022-07-13

## 2022-07-13 DIAGNOSIS — I48.19 PERSISTENT ATRIAL FIBRILLATION: Primary | ICD-10-CM

## 2022-07-13 LAB — INR PPP: 3.1 (ref 2–3)

## 2022-07-13 PROCEDURE — 85610 PROTHROMBIN TIME: CPT | Performed by: INTERNAL MEDICINE

## 2022-07-13 PROCEDURE — 36416 COLLJ CAPILLARY BLOOD SPEC: CPT | Performed by: INTERNAL MEDICINE

## 2022-07-22 ENCOUNTER — TELEPHONE (OUTPATIENT)
Dept: INTERNAL MEDICINE | Facility: CLINIC | Age: 82
End: 2022-07-22

## 2022-07-22 NOTE — TELEPHONE ENCOUNTER
Caller: KRISTOPHER    Relationship: Other    Best call back number: [phone]    What is the medical concern/diagnosis: PATIENT IS BEING TREATED FOR VALVULAR HEART DISEASE THE ICD 10 CODE IS I38    What specialty or service is being requested: HEART SPECIALIST     What is the provider, practice or medical service name: [place] [facility]     Any additional details: PATIENT HAS AN HMO PLAN THAT REQUIRES A REFERRAL, SHE IS AN EXISTING PATIENT AT THIS OFFICE

## 2022-07-28 ENCOUNTER — TREATMENT (OUTPATIENT)
Dept: PHYSICAL THERAPY | Facility: CLINIC | Age: 82
End: 2022-07-28

## 2022-07-28 DIAGNOSIS — M25.561 CHRONIC PAIN OF BOTH KNEES: Primary | ICD-10-CM

## 2022-07-28 DIAGNOSIS — M79.604 LEG PAIN, BILATERAL: ICD-10-CM

## 2022-07-28 DIAGNOSIS — G89.29 CHRONIC PAIN OF BOTH KNEES: Primary | ICD-10-CM

## 2022-07-28 DIAGNOSIS — M25.562 CHRONIC PAIN OF BOTH KNEES: Primary | ICD-10-CM

## 2022-07-28 DIAGNOSIS — M79.605 LEG PAIN, BILATERAL: ICD-10-CM

## 2022-07-28 DIAGNOSIS — R26.9 GAIT DIFFICULTY: ICD-10-CM

## 2022-07-28 DIAGNOSIS — M17.0 ARTHRITIS OF BOTH KNEES: Primary | ICD-10-CM

## 2022-07-28 PROCEDURE — 97161 PT EVAL LOW COMPLEX 20 MIN: CPT | Performed by: PHYSICAL THERAPIST

## 2022-07-28 PROCEDURE — 97110 THERAPEUTIC EXERCISES: CPT | Performed by: PHYSICAL THERAPIST

## 2022-07-28 PROCEDURE — 97530 THERAPEUTIC ACTIVITIES: CPT | Performed by: PHYSICAL THERAPIST

## 2022-07-28 NOTE — PROGRESS NOTES
Physical Therapy Initial Evaluation and Plan of Care    Patient: Margaret Gu   : 1940  Diagnosis/ICD-10 Code:  Chronic pain of both knees [M25.561, M25.562, G89.29], Gait difficulty.      Referring practitioner: Hawk Garcia MD  Past medical Hx reviewed: 2022    Subjective Evaluation    History of Present Illness  Mechanism of injury: I have been having knee pain for about 6 months or so.  I have also had an episode of vertigo that has been problematic for the last 2 months or so.    I got a gel injection on the R and then the L knee started bothering me and swelling up more.   I have been using a rollator and cane over the last 6 months or so.  I feel more comfortable with the rollator when I go out.  I use it for balance and for pain reduction.  Sometimes, when I get up the knee doesn't feel like its going to support me.    I can't really stand long enough to cook.  I get help with shoes and socks from .  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          Patient Occupation: Retired.   Pain  Current pain ratin (after walking in the building for PT)  At best pain rating: 3 (when off the feet.  )  At worst pain ratin  Location: Reports leg pain more than knee pain.    Quality: cramping (Unable to verbalize the quality of the pain)  Relieving factors: rest, ice and medications (Tylenol minor help)  Aggravating factors: ambulation, stairs, sleeping, standing, lifting and squatting  Progression: worsening    Social Support  Patient lives at: [facility].  Mostly sits to shower.    Lives with: spouse      PLOF: Hx of R hip replacement.  Hx of shoulder joint replacement (Doing well).    Objective          Palpation   Left   Hypertonic in the vastus medialis.   Tenderness of the distal biceps femoris, distal semitendinosus, medial gastrocnemius, rectus femoris and vastus lateralis.     Right   Hypertonic in the vastus medialis. Tenderness of the distal biceps femoris, distal  semitendinosus, medial gastrocnemius, rectus femoris and vastus lateralis.     Additional Palpation Details  Hip adductors (B):  TTP 2+      Tenderness   Left Knee   Tenderness in the lateral joint line, LCL (proximal), MCL (distal) and medial joint line.     Right Knee   Tenderness in the lateral joint line, MCL (distal), MCL (proximal) and medial joint line.     Active Range of Motion   Left Knee   Flexion: WFL    Right Knee   Flexion: WFL  Extension: WFL    Strength/Myotome Testing     Left Hip   Planes of Motion   Flexion: 3-  Extension: 3  Abduction: 3-  Adduction: 3    Right Hip   Planes of Motion   Flexion: 3-  Extension: 3  Abduction: 3-  Adduction: 3    Left Knee   Flexion: 3-  Extension: 3+  Quadriceps contraction: fair    Right Knee   Flexion: 4-  Extension: 4-  Quadriceps contraction: fair    Functional Assessment     Comments  TU.03 with cane.  SBA x 1.  Pain 3-4/10.  Percieved exertion:  5/10.     5 x sit to stand:  32.77 sec.  First 3 without hands, last two with hands. Pain 7/10.    Unable to perform steps.  Self-reported  Balance.  Near tandem:   Feet 8 inches apart.  LOB x 2 in 15 sec. Each side           Assessment & Plan     Assessment  Impairments: abnormal gait, abnormal muscle firing, abnormal muscle tone, activity intolerance, impaired balance, impaired physical strength, lacks appropriate home exercise program, pain with function, safety issue and weight-bearing intolerance  Functional Limitations: lifting, sleeping, walking, uncomfortable because of pain, sitting, standing and unable to perform repetitive tasks  Assessment details: Pt presents to PT with symptoms consistent with arthritic knee pain and joint dysfunction.  Of note, she is extremely deconditioned and has significant muscle weakness and atrophy leading to difficulty with ambulation and weight bearing.  Pt will benefit from skilled PT intervention to address the impairments listed and described.     Prognosis:  good  Prognosis details:   ST weeks  1. Patient to be compliant with stretching and HEP  Able to walk up to 3 min with cane with < 4/10 pain for household distances.   2. (B) LE MMT 4-/5 for ability to ascend/descend 10 steps alternating and transfer sit to stand x 5 <25 sec without hands with knee pain <5/10.  3. Increased mobility, agility and confidence to perform TUG < 20 sec with cane and SBA x 1.    4. Pt to exhibit improved stability of knee to 15 sec near tandem on level surface and no LOB to promote safety outdoors and uneven terrain.      LT weeks  1.  Pt. to score < 20% of disability on WOMAC  2.  L LE strength to at least 4+/5 to ascend/descend 15 steps with 1 UE to assist on rail < 3/10 pain.  3.  Pt to exhibit LE endurance/strength to 4+/5 to allow for returning to unrestricted walking > 10 min.   With cane as needed for community distances.    4. Decrease TUG time to <15 sec, increase 5x sit to stand time to <20 sec without UE assist and even foot placement to demonstrate improved function.     5.  Pt to demonstrate increased stability of the knee to balance on form, near tandem for 15sec as needed to traverse uneven terrain safely.    6.  Pt to perform all transfers independently (Car, bed, chair, shower) and able to don shoes and socks without assistance.        Plan  Therapy options: will be seen for skilled therapy services  Planned modality interventions: TENS, ultrasound and cryotherapy  Planned therapy interventions: ADL retraining, manual therapy, neuromuscular re-education, postural training, soft tissue mobilization, strengthening, stretching, therapeutic activities, joint mobilization, home exercise program, gait training, functional ROM exercises, flexibility, body mechanics training and abdominal trunk stabilization  Frequency: 2x week  Duration in visits: 16  Duration in weeks: 8  Treatment plan discussed with: patient      Manual Therapy:    -     mins  39988;  Therapeutic  Exercise:    8     mins  82663;     Neuromuscular Ora:    -    mins  76177;    Therapeutic Activity:     20     mins  67684;   Pt and  education.  Memory/hearing deficits made educational interventions difficult  Gait Training:      -     mins  44134;     Ultrasound:     -     mins  48829;    Electrical Stimulation:         mins  45366 ( );  Dry Needling     -     mins self-pay    Timed Treatment:   28   mins   Total Treatment:     65   mins    PT SIGNATURE:  Hollis Barnett DPT, PT     LAURENCE Dubois License #: [id number]    DATE TREATMENT INITIATED: 7/28/2022    Medicare Initial Certification  Certification Period: 10/26/2022  I certify that the therapy services are furnished while this patient is under my care.  The services outlined above are required by this patient, and will be reviewed every 90 days.     PHYSICIAN: Hawk Garcia MD      DATE:     Please sign and return via fax to [phone].. Thank you, [facility]iatric [facility].

## 2022-08-04 ENCOUNTER — TREATMENT (OUTPATIENT)
Dept: PHYSICAL THERAPY | Facility: CLINIC | Age: 82
End: 2022-08-04

## 2022-08-04 DIAGNOSIS — G89.29 CHRONIC PAIN OF BOTH KNEES: Primary | ICD-10-CM

## 2022-08-04 DIAGNOSIS — M79.604 LEG PAIN, BILATERAL: ICD-10-CM

## 2022-08-04 DIAGNOSIS — M25.562 CHRONIC PAIN OF BOTH KNEES: Primary | ICD-10-CM

## 2022-08-04 DIAGNOSIS — R26.9 GAIT DIFFICULTY: ICD-10-CM

## 2022-08-04 DIAGNOSIS — M25.561 CHRONIC PAIN OF BOTH KNEES: Primary | ICD-10-CM

## 2022-08-04 DIAGNOSIS — M79.605 LEG PAIN, BILATERAL: ICD-10-CM

## 2022-08-04 PROCEDURE — 97110 THERAPEUTIC EXERCISES: CPT | Performed by: PHYSICAL THERAPIST

## 2022-08-04 PROCEDURE — G0283 ELEC STIM OTHER THAN WOUND: HCPCS | Performed by: PHYSICAL THERAPIST

## 2022-08-04 NOTE — PROGRESS NOTES
Physical Therapy Daily Treatment Note      Patient: Margaret Gu   : 1940  Referring practitioner: ARMIN Palma  Date of Initial Visit: Type: THERAPY  Noted: 2022  Today's Date: 2022  Patient seen for 2 sessions       Visit Diagnoses:    ICD-10-CM ICD-9-CM   1. Chronic pain of both knees  M25.561 719.46    M25.562 338.29    G89.29    2. Leg pain, bilateral  M79.604 729.5    M79.605    3. Gait difficulty  R26.9 781.2     Subjective I have been using my walker a little bit over the last couple of days because I haven't been very confident in my walking.      Objective   See Exercise, Manual, and Modality Logs for complete treatment.     Assessment/Plan Today's visit included initiation of basic seated TE to help build entry level strength and tolerance to new activity.  She was provided with handout and she and her  were educated on frequencies, duration and expectations of soreness from doing the exercises.      Progress per Plan of Care and Progress strengthening /stabilization /functional activity    Timed:         Manual Therapy:    -     mins  76838;     Therapeutic Exercise:    35     mins  43889;     Neuromuscular Oar:    -    mins  84128;    Therapeutic Activity:     -     mins  50075;     Gait Training:      -     mins  98945;     Ultrasound:     -     mins  01780;    Electrical Stimulation:    15     mins  29819 ( );  Dry Needling     -     mins self-pay  Traction     -     mins 56122      Timed Treatment:   35   mins   Total Treatment:     60   mins    LAURENCE Dubois License: [id number]

## 2022-08-08 ENCOUNTER — TREATMENT (OUTPATIENT)
Dept: PHYSICAL THERAPY | Facility: CLINIC | Age: 82
End: 2022-08-08

## 2022-08-08 DIAGNOSIS — M79.604 LEG PAIN, BILATERAL: ICD-10-CM

## 2022-08-08 DIAGNOSIS — G89.29 CHRONIC PAIN OF BOTH KNEES: Primary | ICD-10-CM

## 2022-08-08 DIAGNOSIS — M79.605 LEG PAIN, BILATERAL: ICD-10-CM

## 2022-08-08 DIAGNOSIS — M25.561 CHRONIC PAIN OF BOTH KNEES: Primary | ICD-10-CM

## 2022-08-08 DIAGNOSIS — R26.9 GAIT DIFFICULTY: ICD-10-CM

## 2022-08-08 DIAGNOSIS — M25.562 CHRONIC PAIN OF BOTH KNEES: Primary | ICD-10-CM

## 2022-08-08 PROCEDURE — 97110 THERAPEUTIC EXERCISES: CPT | Performed by: PHYSICAL THERAPIST

## 2022-08-08 NOTE — PROGRESS NOTES
Physical Therapy Daily Treatment Note      Patient: Margaret Gu   : 1940  Referring practitioner: ARMIN Palma  Date of Initial Visit: Type: THERAPY  Noted: 2022  Today's Date: 2022  Patient seen for 3 sessions       Visit Diagnoses:    ICD-10-CM ICD-9-CM   1. Chronic pain of both knees  M25.561 719.46    M25.562 338.29    G89.29    2. Leg pain, bilateral  M79.604 729.5    M79.605    3. Gait difficulty  R26.9 781.2     Subjective The L knees still hurt pretty bad.      Objective   See Exercise, Manual, and Modality Logs for complete treatment.     Assessment/Plan Pt tolerated treatment fair.  She struggles with seated hip flexion but provided support band to facilitate muscle activation for lifting.      Progress per Plan of Care and Progress strengthening /stabilization /functional activity    Timed:         Manual Therapy:    -     mins  44637;     Therapeutic Exercise:    38     mins  27955;     Neuromuscular Ora:    -    mins  44296;    Therapeutic Activity:     -     mins  55597;     Gait Training:      -     mins  13018;     Ultrasound:     -     mins  49519;    Electrical Stimulation:    -     mins  01080 ( );  Dry Needling     -     mins self-pay  Traction     -     mins 92915      Timed Treatment:   38   mins   Total Treatment:     50   mins    LAURENCE Dubois License: [id number]

## 2022-08-11 ENCOUNTER — TREATMENT (OUTPATIENT)
Dept: PHYSICAL THERAPY | Facility: CLINIC | Age: 82
End: 2022-08-11

## 2022-08-11 DIAGNOSIS — R26.9 GAIT DIFFICULTY: ICD-10-CM

## 2022-08-11 DIAGNOSIS — M79.604 LEG PAIN, BILATERAL: ICD-10-CM

## 2022-08-11 DIAGNOSIS — M79.605 LEG PAIN, BILATERAL: ICD-10-CM

## 2022-08-11 DIAGNOSIS — M25.561 CHRONIC PAIN OF BOTH KNEES: Primary | ICD-10-CM

## 2022-08-11 DIAGNOSIS — G89.29 CHRONIC PAIN OF BOTH KNEES: Primary | ICD-10-CM

## 2022-08-11 DIAGNOSIS — M25.562 CHRONIC PAIN OF BOTH KNEES: Primary | ICD-10-CM

## 2022-08-11 PROCEDURE — 97110 THERAPEUTIC EXERCISES: CPT | Performed by: PHYSICAL THERAPIST

## 2022-08-11 NOTE — PROGRESS NOTES
Physical Therapy Daily Treatment Note      Patient: Margaret Gu   : 1940  Referring practitioner: ARMIN Palma  Date of Initial Visit: Type: THERAPY  Noted: 2022  Today's Date: 2022  Patient seen for 4 sessions       Visit Diagnoses:    ICD-10-CM ICD-9-CM   1. Chronic pain of both knees  M25.561 719.46    M25.562 338.29    G89.29    2. Leg pain, bilateral  M79.604 729.5    M79.605    3. Gait difficulty  R26.9 781.2     Subjective   Doing OK, still having pain in (L) knee.  I got that gel shot in my (R) knee a few months ago and it seemed to help.     Objective   See Exercise, Manual, and Modality Logs for complete treatment.     Assessment/Plan   Tolerated continued progression of therapeutic exercise well today, does fatigue rapidly and requires frequent rest breaks.  We reviewed hip flex assisted exercise with TB, pt with poor recall of this exercise.  Had episode of vertigo when sitting up from supine, we discussed potential Rx for that as well.       Progress per Plan of Care and Progress strengthening /stabilization /functional activity    Timed:         Manual Therapy:    -     mins  20658;     Therapeutic Exercise:    40     mins  70712;     Neuromuscular Ora:    -    mins  40979;    Therapeutic Activity:     -     mins  05139;     Gait Training:      -     mins  23664;     Ultrasound:     -     mins  43136;    Electrical Stimulation:    -     mins  05330 ( );  Dry Needling     -     mins self-pay  Traction     -     mins 90541      Timed Treatment:   40   mins   Total Treatment:     52   mins    FRANCES Browne License #[id number]  Physical Therapist Assistant

## 2022-08-15 ENCOUNTER — TELEPHONE (OUTPATIENT)
Dept: INTERNAL MEDICINE | Facility: CLINIC | Age: 82
End: 2022-08-15

## 2022-08-15 ENCOUNTER — TREATMENT (OUTPATIENT)
Dept: PHYSICAL THERAPY | Facility: CLINIC | Age: 82
End: 2022-08-15

## 2022-08-15 DIAGNOSIS — R26.9 GAIT DIFFICULTY: ICD-10-CM

## 2022-08-15 DIAGNOSIS — M25.562 CHRONIC PAIN OF BOTH KNEES: Primary | ICD-10-CM

## 2022-08-15 DIAGNOSIS — M25.561 CHRONIC PAIN OF BOTH KNEES: Primary | ICD-10-CM

## 2022-08-15 DIAGNOSIS — R42 DIZZINESS: ICD-10-CM

## 2022-08-15 DIAGNOSIS — R42 VERTIGO: Primary | ICD-10-CM

## 2022-08-15 DIAGNOSIS — M79.605 LEG PAIN, BILATERAL: ICD-10-CM

## 2022-08-15 DIAGNOSIS — M79.604 LEG PAIN, BILATERAL: ICD-10-CM

## 2022-08-15 DIAGNOSIS — G89.29 CHRONIC PAIN OF BOTH KNEES: Primary | ICD-10-CM

## 2022-08-15 PROCEDURE — 97110 THERAPEUTIC EXERCISES: CPT | Performed by: PHYSICAL THERAPIST

## 2022-08-15 NOTE — PROGRESS NOTES
Physical Therapy Daily Treatment Note      Patient: Margaret Gu   : 1940  Referring practitioner: ARMIN Palma  Date of Initial Visit: Type: THERAPY  Noted: 2022  Today's Date: 8/15/2022  Patient seen for 5 sessions       Visit Diagnoses:    ICD-10-CM ICD-9-CM   1. Chronic pain of both knees  M25.561 719.46    M25.562 338.29    G89.29    2. Leg pain, bilateral  M79.604 729.5    M79.605    3. Gait difficulty  R26.9 781.2     Subjective No new complaints today.      Objective   See Exercise, Manual, and Modality Logs for complete treatment.     Assessment/Plan Today's visit focused on progression of TE to Standing with UE support.  She tolerated all progressions well with the exception of still having difficulty with R hip flexion/marching.  We did have her perform muscle roll stretch which helped some.  We did apply ice following.        Progress per Plan of Care and Progress strengthening /stabilization /functional activity    Timed:         Manual Therapy:    -     mins  71031;     Therapeutic Exercise:    45     mins  85828;     Neuromuscular Ora:    -    mins  24608;    Therapeutic Activity:     -     mins  34073;     Gait Training:      -     mins  04036;     Ultrasound:     -     mins  89373;    Electrical Stimulation:    -     mins  11249 ( );  Dry Needling     -     mins self-pay  Traction     -     mins 20739      Timed Treatment:   45   mins   Total Treatment:     60   mins    LAURENCE Dubois License: [id number]

## 2022-08-17 ENCOUNTER — OFFICE VISIT (OUTPATIENT)
Dept: CARDIOLOGY | Facility: CLINIC | Age: 82
End: 2022-08-17

## 2022-08-17 VITALS
DIASTOLIC BLOOD PRESSURE: 72 MMHG | SYSTOLIC BLOOD PRESSURE: 124 MMHG | HEIGHT: 62 IN | HEART RATE: 82 BPM | BODY MASS INDEX: 31.83 KG/M2 | WEIGHT: 173 LBS

## 2022-08-17 DIAGNOSIS — Z98.890 HISTORY OF TRICUSPID VALVE REPAIR: Chronic | ICD-10-CM

## 2022-08-17 DIAGNOSIS — I10 ESSENTIAL HYPERTENSION: Chronic | ICD-10-CM

## 2022-08-17 DIAGNOSIS — I48.21 PERMANENT ATRIAL FIBRILLATION: Primary | ICD-10-CM

## 2022-08-17 DIAGNOSIS — Z98.890 HISTORY OF MITRAL VALVE REPAIR: Chronic | ICD-10-CM

## 2022-08-17 DIAGNOSIS — R00.2 PALPITATIONS: ICD-10-CM

## 2022-08-17 DIAGNOSIS — I51.9 LV DYSFUNCTION: Chronic | ICD-10-CM

## 2022-08-17 PROBLEM — I48.19 PERSISTENT ATRIAL FIBRILLATION (HCC): Chronic | Status: RESOLVED | Noted: 2020-10-22 | Resolved: 2022-08-17

## 2022-08-17 LAB — INR PPP: 4 (ref 0.9–1.1)

## 2022-08-17 PROCEDURE — 36416 COLLJ CAPILLARY BLOOD SPEC: CPT | Performed by: INTERNAL MEDICINE

## 2022-08-17 PROCEDURE — 85610 PROTHROMBIN TIME: CPT | Performed by: INTERNAL MEDICINE

## 2022-08-17 PROCEDURE — 99214 OFFICE O/P EST MOD 30 MIN: CPT | Performed by: INTERNAL MEDICINE

## 2022-08-17 PROCEDURE — 93000 ELECTROCARDIOGRAM COMPLETE: CPT | Performed by: INTERNAL MEDICINE

## 2022-08-17 NOTE — PROGRESS NOTES
"Chief Complaint  Follow-up (1 year ), Atrial Fibrillation, and Cardiac Valve Problem    Subjective    History of Present Illness    I saw Margaret in the office today.  She has a history of mitral and tricuspid valve repair in 2011.  Cardiac catheterization at that time revealed normal coronary arteries.  She had paroxysmal atrial fibrillation in the past.  She was on anticoagulation but developed significant GI bleeds and anticoagulation was discontinued at that time.  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  She also has a history of essential hypertension, thyroid disorder, GERD, cognitive impairment, COPD and prior history of BOOP.  She had a prior history of sleep apnea but states that her last sleep study was negative.  Her atrial fibrillation has since become permanent.  She has been placed on warfarin and has tolerated well to this point.    She presents to the office today saying that she is having chest pain.  When I question her, she is experiencing palpitations and describes this as chest discomfort.  She describes it as a pounding sensation intermittently several times a day.  She does have some shortness of air in association with symptoms.  She has not been walking as much as she had been on her last office visit.  She states that she has been experiencing lower extremity edema but she has no swelling in the office today.  No orthopnea or paroxysmal nocturnal dyspnea.  Her  does accompany her to the office and assists with her history.      Objective   Vital Signs:   /72   Pulse 82   Ht 157.5 cm (62\")   Wt 78.5 kg (173 lb)   BMI 31.64 kg/m²     Vitals reviewed.   Constitutional:       Appearance: Healthy appearance. Not in distress.   Neck:      Vascular: No JVR. JVD normal.   Pulmonary:      Effort: Pulmonary effort is normal.      Breath sounds: Normal breath sounds. No wheezing. No rhonchi. No rales.   Chest:      Chest wall: Not " tender to palpatation.      Comments: Healed surgical scar  Cardiovascular:      PMI at left midclavicular line. Normal rate. Irregularly irregular rhythm. Normal S1. Normal S2.      Murmurs: There is a systolic murmur.  There is a 1/6 systolic murmur heard at the left lower sternal border      No gallop. No click. No rub.   Pulses:     Intact distal pulses.   Edema:     Peripheral edema absent.   Abdominal:      General: Bowel sounds are normal.      Palpations: Abdomen is soft.      Tenderness: There is no abdominal tenderness.   Musculoskeletal: Normal range of motion.         General: No tenderness.      Cervical back: Normal range of motion and neck supple. Skin:     General: Skin is warm and dry.   Neurological:      General: No focal deficit present.      Mental Status: Alert and oriented to person, place and time. Exhibits a cognitive deficit.      Gait: Gait is intact.         Result Review :   The following data was reviewed by: Stephany Lizama MD on 08/17/2022:  CMP    CMP 5/2/22   Glucose 79   BUN 13   Creatinine 1.08 (A)   Sodium 145 (A)   Potassium 4.1   Chloride 103   Calcium 9.3   Total Protein 6.3   Albumin 4.1   Globulin 2.2   Total Bilirubin 1.8 (A)   Alkaline Phosphatase 83   AST (SGOT) 25   ALT (SGPT) 19   (A) Abnormal value            CBC    CBC 5/2/22   WBC 9.7   RBC 4.19   Hemoglobin 13.3   Hematocrit 40.3   MCV 96   MCH 31.7   MCHC 33.0   RDW 14.6   Platelets 157                    ECG 12 Lead    Date/Time: 8/17/2022 2:09 PM  Performed by: Stephany Lizama MD  Authorized by: Stephany Lizama MD   Comparison: compared with previous ECG from 10/23/2020  Comparison to previous ECG: Atrial fibrillation with controlled ventricular response.  Early R wave transition  Comments: Atrial fibrillation with ventricular rate of 77 bpm.  Incomplete right bundle branch block.  Nonspecific T wave changes              Assessment and Plan    1. Permanent atrial fibrillation (HCC)  Margaret appears to have a  permanent atrial fibrillation.  I am going to place a monitor and see if she is having accelerated heart rates.  I think this is what she is describing when she says chest discomfort.  She is on metoprolol 100 mg daily.  I am not going to increase this unless I see significantly elevated rates.    2. History of mitral valve repair  December 2011: Mitral valve repair with remodeling annuloplasty using 26 mm [employer] physiotwo annuloplasty ring    3. History of tricuspid valve repair  December 2011: Remodeling annuloplasty ring using number 26 mm [employer] MC 3 annuloplasty ring    4. Essential hypertension  Controlled    5. LV dysfunction  Her echocardiogram in 2020 demonstrated an ejection fraction of 49%.    I am going to repeat her echocardiogram to assess her left ventricular function.  We will place a Zio patch to assess heart rate control.  She will continue with warfarin.        Follow Up   No follow-ups on file.  Patient was given instructions and counseling regarding her condition or for health maintenance advice. Please see specific information pulled into the AVS if appropriate.

## 2022-08-18 ENCOUNTER — TREATMENT (OUTPATIENT)
Dept: PHYSICAL THERAPY | Facility: CLINIC | Age: 82
End: 2022-08-18

## 2022-08-18 ENCOUNTER — CLINICAL SUPPORT (OUTPATIENT)
Dept: CARDIOLOGY | Facility: CLINIC | Age: 82
End: 2022-08-18

## 2022-08-18 DIAGNOSIS — M25.562 CHRONIC PAIN OF BOTH KNEES: Primary | ICD-10-CM

## 2022-08-18 DIAGNOSIS — I48.21 PERMANENT ATRIAL FIBRILLATION: Primary | Chronic | ICD-10-CM

## 2022-08-18 DIAGNOSIS — G89.29 CHRONIC PAIN OF BOTH KNEES: Primary | ICD-10-CM

## 2022-08-18 DIAGNOSIS — M79.604 LEG PAIN, BILATERAL: ICD-10-CM

## 2022-08-18 DIAGNOSIS — M79.605 LEG PAIN, BILATERAL: ICD-10-CM

## 2022-08-18 DIAGNOSIS — M25.561 CHRONIC PAIN OF BOTH KNEES: Primary | ICD-10-CM

## 2022-08-18 DIAGNOSIS — R26.9 GAIT DIFFICULTY: ICD-10-CM

## 2022-08-18 LAB — INR PPP: 2.6 (ref 0.9–1.1)

## 2022-08-18 PROCEDURE — 85610 PROTHROMBIN TIME: CPT | Performed by: INTERNAL MEDICINE

## 2022-08-18 PROCEDURE — 36416 COLLJ CAPILLARY BLOOD SPEC: CPT | Performed by: INTERNAL MEDICINE

## 2022-08-18 PROCEDURE — 97110 THERAPEUTIC EXERCISES: CPT | Performed by: PHYSICAL THERAPIST

## 2022-08-18 NOTE — PROGRESS NOTES
Physical Therapy Daily Treatment Note      Patient: Margaret Gu   : 1940  Referring practitioner: ARMIN Palma  Date of Initial Visit: Type: THERAPY  Noted: 2022  Today's Date: 2022  Patient seen for 6 sessions       Visit Diagnoses:    ICD-10-CM ICD-9-CM   1. Chronic pain of both knees  M25.561 719.46    M25.562 338.29    G89.29    2. Leg pain, bilateral  M79.604 729.5    M79.605    3. Gait difficulty  R26.9 781.2     Subjective No new complaints today.      Objective : Improved Hip flexion AROM in standing with (B) UE support   See Exercise, Manual, and Modality Logs for complete treatment.     Assessment/Plan We continue to progress program as tolerated.  She still is not ready for dynamic stability training but baseline strength and endurance is improving.  Her memory deficit limits retention of activity and consistency with TE performance. Frequent verbal cues required.      Progress per Plan of Care and Progress strengthening /stabilization /functional activity    Timed:         Manual Therapy:    -     mins  75696;     Therapeutic Exercise:    45     mins  21621;     Neuromuscular Ora:    -    mins  05486;    Therapeutic Activity:     -     mins  68684;     Gait Training:      -     mins  23975;     Ultrasound:     -     mins  92327;    Electrical Stimulation:    -     mins  27440 ( );  Dry Needling     -     mins self-pay  Traction     -     mins 71573      Timed Treatment:   45   mins   Total Treatment:     53   mins    LAURENCE Dubois License: [id number]

## 2022-08-19 ENCOUNTER — PATIENT ROUNDING (BHMG ONLY) (OUTPATIENT)
Dept: CARDIOLOGY | Facility: CLINIC | Age: 82
End: 2022-08-19

## 2022-08-19 NOTE — PROGRESS NOTES
"August 19, 2022    Hello, may I speak with Margaret Gu?    My name is René      I am  with [facility]  [address]  [facility] [address] [phone].    Before we get started may I verify your date of birth? 1940    I am calling to speak with about your recent visit to our office. Is this a good time to talk? yes    Tell me about your visit with us. What things went well?  \"Everybody is just always so nice and wonderful.\"     We're always looking for ways to make our patients' experiences even better. Do you have recommendations on ways we may improve? \" no, you all don't need to change a bit\"      Overall were you satisfied with your visit to our practice? yes    I appreciate you taking the time to speak with me today. Is there anything else I can do for you? no      Thank you, and have a great day.      "

## 2022-08-22 ENCOUNTER — TREATMENT (OUTPATIENT)
Dept: PHYSICAL THERAPY | Facility: CLINIC | Age: 82
End: 2022-08-22

## 2022-08-22 DIAGNOSIS — G89.29 CHRONIC PAIN OF BOTH KNEES: Primary | ICD-10-CM

## 2022-08-22 DIAGNOSIS — M25.562 CHRONIC PAIN OF BOTH KNEES: Primary | ICD-10-CM

## 2022-08-22 DIAGNOSIS — M79.604 LEG PAIN, BILATERAL: ICD-10-CM

## 2022-08-22 DIAGNOSIS — M79.605 LEG PAIN, BILATERAL: ICD-10-CM

## 2022-08-22 DIAGNOSIS — M25.561 CHRONIC PAIN OF BOTH KNEES: Primary | ICD-10-CM

## 2022-08-22 DIAGNOSIS — R26.9 GAIT DIFFICULTY: ICD-10-CM

## 2022-08-22 PROCEDURE — 97110 THERAPEUTIC EXERCISES: CPT | Performed by: PHYSICAL THERAPIST

## 2022-08-22 NOTE — PROGRESS NOTES
Physical Therapy Daily Treatment Note      Patient: Margaret Gu   : 1940  Referring practitioner: ARMIN Palma  Date of Initial Visit: Type: THERAPY  Noted: 2022  Today's Date: 2022  Patient seen for 7 sessions       Visit Diagnoses:    ICD-10-CM ICD-9-CM   1. Chronic pain of both knees  M25.561 719.46    M25.562 338.29    G89.29    2. Leg pain, bilateral  M79.604 729.5    M79.605    3. Gait difficulty  R26.9 781.2     Subjective   No new complaints today.      Objective   See Exercise, Manual, and Modality Logs for complete treatment.     Assessment/Plan   Continued to progress program as tolerated.  Memory deficit limits retention of activity and consistency with TE performance.  Both pt and  became emotional when discussing their situation, plan to look into resources from the [employer].    Progress per Plan of Care and Progress strengthening /stabilization /functional activity    Timed:         Manual Therapy:    -     mins  52689;     Therapeutic Exercise:    45     mins  15875;     Neuromuscular Ora:    -    mins  63329;    Therapeutic Activity:     -     mins  64963;     Gait Training:      -     mins  12819;     Ultrasound:     -     mins  28249;    Electrical Stimulation:    -     mins  23853 ( );  Dry Needling     -     mins self-pay  Traction     -     mins 35626      Timed Treatment:   45   mins   Total Treatment:     55   mins    FRANCES Browne License #[id number]  Physical Therapist Assistant

## 2022-08-29 ENCOUNTER — TREATMENT (OUTPATIENT)
Dept: PHYSICAL THERAPY | Facility: CLINIC | Age: 82
End: 2022-08-29

## 2022-08-29 DIAGNOSIS — M25.561 CHRONIC PAIN OF BOTH KNEES: Primary | ICD-10-CM

## 2022-08-29 DIAGNOSIS — R26.9 GAIT DIFFICULTY: ICD-10-CM

## 2022-08-29 DIAGNOSIS — G89.29 CHRONIC PAIN OF BOTH KNEES: Primary | ICD-10-CM

## 2022-08-29 DIAGNOSIS — M79.604 LEG PAIN, BILATERAL: ICD-10-CM

## 2022-08-29 DIAGNOSIS — M25.562 CHRONIC PAIN OF BOTH KNEES: Primary | ICD-10-CM

## 2022-08-29 DIAGNOSIS — M79.605 LEG PAIN, BILATERAL: ICD-10-CM

## 2022-08-29 PROCEDURE — 97110 THERAPEUTIC EXERCISES: CPT | Performed by: PHYSICAL THERAPIST

## 2022-08-29 PROCEDURE — 97530 THERAPEUTIC ACTIVITIES: CPT | Performed by: PHYSICAL THERAPIST

## 2022-08-29 NOTE — PROGRESS NOTES
"  Physical Therapy Progress Note      Patient: Margaret Gu   : 1940  Referring practitioner: ARMIN Palma  Date of Initial Visit: Type: THERAPY  Noted: 2022  Today's Date: 2022  Patient seen for 8 sessions       Visit Diagnoses:    ICD-10-CM ICD-9-CM   1. Chronic pain of both knees  M25.561 719.46    M25.562 338.29    G89.29    2. Leg pain, bilateral  M79.604 729.5    M79.605    3. Gait difficulty  R26.9 781.2     Subjective  No new complaints.  The R thigh still really bothers me when I have to lift the leg.      Objective   Evaluation Vs. (Progress note)     Strength/Myotome Testing      Left Hip   Planes of Motion   Flexion: 3- (4-/5 in sitting)  Extension: 3 (4/5 in sitting)  Abduction: 3- (4/5 in sitting)  Adduction: 3 (4+/5 in sitting)      Right Hip   Planes of Motion   Flexion: 3- (3+/5 pain reported)  Extension: 3 (4/5)  Abduction: 3-(4-/5)  Adduction: 3 (4+/5)      Left Knee   Flexion: 3- (4+/5)  Extension: 3+ (4/5)   Quadriceps contraction: fair.  (good)      Right Knee   Flexion: 4- (4+/5)  Extension: 4-(4+/5)  Quadriceps contraction: fair (Fair +)     Functional Assessment      Comments  Eval:  TU.03 with cane.  SBA x 1.  Pain 3-4/10.  Percieved exertion:  5/10.    Current:   16.30 sec with Cane and SBAx 1.    Eval: 5 x sit to stand:  32.77 sec.  First 3 without hands, last two with hands. Pain 7/10.               Current:  28.70 sec.  Without UE for all reps.  5/10   Eval: Unable to perform steps.  Self-reported  Current:   Step up:  6\":  Performed well with alternating R /L leg.                    Step up: 8\":  Performed with slight contralateral push-off. Able on R and L.    Eval:  Balance.  Near tandem:   Feet 8 inches apart.  LOB x 2 in 15 sec. Each side  Current: FULL Tandem: UE needed to set up.  15 sec max each side before LOB.        See Exercise, Manual, and Modality Logs for complete treatment.     Assessment/Plan Pt has demonstrated improvement in " all functional/balance testing criteria but continues to have R Quadriceps pain with contraction.  Now that she does demonstrate improved baseline strength. Her ongoing sessions will involve more WBing and balance training activity for improved confidence and safety.  She has met all STG and progress toward LTG.      Progress per Plan of Care and Progress strengthening /stabilization /functional activity    Timed:         Manual Therapy:    -     mins  30346;     Therapeutic Exercise:    15     mins  73310;     Neuromuscular Ora:    5    mins  09101;    Therapeutic Activity:     25     mins  51843;     Gait Training:      -     mins  49072;     Ultrasound:     -     mins  92277;    Electrical Stimulation:    -     mins  67344 ( );  Dry Needling     --     mins self-pay  Traction     -     mins 02357      Timed Treatment:   45   mins   Total Treatment:     50   mins    Hollis Barnett PT  [place] License: [id number]

## 2022-08-31 ENCOUNTER — CLINICAL SUPPORT (OUTPATIENT)
Dept: CARDIOLOGY | Facility: CLINIC | Age: 82
End: 2022-08-31

## 2022-08-31 ENCOUNTER — TREATMENT (OUTPATIENT)
Dept: PHYSICAL THERAPY | Facility: CLINIC | Age: 82
End: 2022-08-31

## 2022-08-31 DIAGNOSIS — G89.29 CHRONIC PAIN OF BOTH KNEES: Primary | ICD-10-CM

## 2022-08-31 DIAGNOSIS — M79.605 LEG PAIN, BILATERAL: ICD-10-CM

## 2022-08-31 DIAGNOSIS — I48.21 PERMANENT ATRIAL FIBRILLATION: Primary | Chronic | ICD-10-CM

## 2022-08-31 DIAGNOSIS — M79.604 LEG PAIN, BILATERAL: ICD-10-CM

## 2022-08-31 DIAGNOSIS — M25.561 CHRONIC PAIN OF BOTH KNEES: Primary | ICD-10-CM

## 2022-08-31 DIAGNOSIS — M25.562 CHRONIC PAIN OF BOTH KNEES: Primary | ICD-10-CM

## 2022-08-31 DIAGNOSIS — R26.9 GAIT DIFFICULTY: ICD-10-CM

## 2022-08-31 LAB — INR PPP: 2.5 (ref 2–3)

## 2022-08-31 PROCEDURE — 36416 COLLJ CAPILLARY BLOOD SPEC: CPT | Performed by: INTERNAL MEDICINE

## 2022-08-31 PROCEDURE — 85610 PROTHROMBIN TIME: CPT | Performed by: INTERNAL MEDICINE

## 2022-08-31 PROCEDURE — 97110 THERAPEUTIC EXERCISES: CPT | Performed by: PHYSICAL THERAPIST

## 2022-08-31 PROCEDURE — 97112 NEUROMUSCULAR REEDUCATION: CPT | Performed by: PHYSICAL THERAPIST

## 2022-08-31 NOTE — PROGRESS NOTES
Physical Therapy Daily Treatment Note      Patient: Margaret Gu   : 1940  Referring practitioner: ARMIN Palma  Date of Initial Visit: Type: THERAPY  Noted: 2022  Today's Date: 2022  Patient seen for 9 sessions       Visit Diagnoses:    ICD-10-CM ICD-9-CM   1. Chronic pain of both knees  M25.561 719.46    M25.562 338.29    G89.29    2. Leg pain, bilateral  M79.604 729.5    M79.605    3. Gait difficulty  R26.9 781.2     Subjective No new complaints today.     Objective   See Exercise, Manual, and Modality Logs for complete treatment.     Assessment/Plan Pt tolerated treatment well overall and we focused exclusively on WBing strength building and balance training.      Progress per Plan of Care and Progress strengthening /stabilization /functional activity    Timed:         Manual Therapy:    -     mins  54604;     Therapeutic Exercise:    32     mins  44910;     Neuromuscular Ora:    10    mins  71114;    Therapeutic Activity:     -     mins  06592;     Gait Training:      --     mins  22850;     Ultrasound:     -     mins  25133;    Electrical Stimulation:    -     mins  83643 ( );  Dry Needling     -     mins self-pay  Traction     -     mins 27355      Timed Treatment:   42   mins   Total Treatment:     50   mins    LAURENCE Dubois License: [id number]

## 2022-09-01 ENCOUNTER — TELEPHONE (OUTPATIENT)
Dept: INTERNAL MEDICINE | Facility: CLINIC | Age: 82
End: 2022-09-01

## 2022-09-01 RX ORDER — TRAZODONE HYDROCHLORIDE 100 MG/1
100 TABLET ORAL NIGHTLY
Qty: 90 TABLET | Refills: 1 | Status: SHIPPED | OUTPATIENT
Start: 2022-09-01

## 2022-09-01 NOTE — TELEPHONE ENCOUNTER
PATIENT'S  CALLED FOR MEDICATION REFILL OF   traZODone (DESYREL) 50 MG tablet  HE STATES SHE USUALLY GETS 100 MG AND HE CUTS THEM IN HALF.  90 DAY SUPPLY    SHE HAS ENOUGH FOR ABOUT 10 DAYS    [facility] (Now [facility]) - [place], [place] - [address] - [phone] PH - [phone] FX  [phone]    CALL BACK NUMBER [phone]

## 2022-09-06 ENCOUNTER — TREATMENT (OUTPATIENT)
Dept: PHYSICAL THERAPY | Facility: CLINIC | Age: 82
End: 2022-09-06

## 2022-09-06 DIAGNOSIS — M25.562 CHRONIC PAIN OF BOTH KNEES: Primary | ICD-10-CM

## 2022-09-06 DIAGNOSIS — G89.29 CHRONIC PAIN OF BOTH KNEES: Primary | ICD-10-CM

## 2022-09-06 DIAGNOSIS — M25.561 CHRONIC PAIN OF BOTH KNEES: Primary | ICD-10-CM

## 2022-09-06 DIAGNOSIS — M79.605 LEG PAIN, BILATERAL: ICD-10-CM

## 2022-09-06 DIAGNOSIS — R26.9 GAIT DIFFICULTY: ICD-10-CM

## 2022-09-06 DIAGNOSIS — M79.604 LEG PAIN, BILATERAL: ICD-10-CM

## 2022-09-06 PROCEDURE — 97116 GAIT TRAINING THERAPY: CPT | Performed by: PHYSICAL THERAPIST

## 2022-09-06 PROCEDURE — 97110 THERAPEUTIC EXERCISES: CPT | Performed by: PHYSICAL THERAPIST

## 2022-09-06 PROCEDURE — 97112 NEUROMUSCULAR REEDUCATION: CPT | Performed by: PHYSICAL THERAPIST

## 2022-09-07 NOTE — PROGRESS NOTES
Physical Therapy Daily Treatment Note      Patient: Margaret Gu   : 1940  Referring practitioner: ARMIN Palma  Date of Initial Visit: Type: THERAPY  Noted: 2022  Today's Date: 2022  Patient seen for 10 sessions       Visit Diagnoses:    ICD-10-CM ICD-9-CM   1. Chronic pain of both knees  M25.561 719.46    M25.562 338.29    G89.29    2. Leg pain, bilateral  M79.604 729.5    M79.605    3. Gait difficulty  R26.9 781.2     Subjective No new complaints today.      Objective   See Exercise, Manual, and Modality Logs for complete treatment.     Assessment/Plan Today's visit focused on gait throughout the clinic with dependence on cane and balance.  She performed very well and was stable with ambulation without AD and SBA x 1.      Progress per Plan of Care and Progress strengthening /stabilization /functional activity    Timed:         Manual Therapy:    -     mins  69358;     Therapeutic Exercise:    30     mins  29245;     Neuromuscular Ora:    12    mins  62509;    Therapeutic Activity:     -     mins  72319;     Gait Trainin     mins  88777;     Ultrasound:     -     mins  23089;    Electrical Stimulation:    -     mins  25450 ( );  Dry Needling     -     mins self-pay  Traction     -     mins 18852      Timed Treatment:   50   mins   Total Treatment:     55   mins    LAURENCE Dubois License: [id number]

## 2022-09-08 ENCOUNTER — TREATMENT (OUTPATIENT)
Dept: PHYSICAL THERAPY | Facility: CLINIC | Age: 82
End: 2022-09-08

## 2022-09-08 DIAGNOSIS — G89.29 CHRONIC PAIN OF BOTH KNEES: Primary | ICD-10-CM

## 2022-09-08 DIAGNOSIS — M79.605 LEG PAIN, BILATERAL: ICD-10-CM

## 2022-09-08 DIAGNOSIS — R26.9 GAIT DIFFICULTY: ICD-10-CM

## 2022-09-08 DIAGNOSIS — M25.562 CHRONIC PAIN OF BOTH KNEES: Primary | ICD-10-CM

## 2022-09-08 DIAGNOSIS — M79.604 LEG PAIN, BILATERAL: ICD-10-CM

## 2022-09-08 DIAGNOSIS — M25.561 CHRONIC PAIN OF BOTH KNEES: Primary | ICD-10-CM

## 2022-09-08 PROCEDURE — 97110 THERAPEUTIC EXERCISES: CPT | Performed by: PHYSICAL THERAPIST

## 2022-09-08 PROCEDURE — 97112 NEUROMUSCULAR REEDUCATION: CPT | Performed by: PHYSICAL THERAPIST

## 2022-09-08 NOTE — PROGRESS NOTES
Physical Therapy Daily Treatment Note      Patient: Margaret Gu   : 1940  Referring practitioner: ARMIN Palma  Date of Initial Visit: Type: THERAPY  Noted: 2022  Today's Date: 2022  Patient seen for 11 sessions       Visit Diagnoses:    ICD-10-CM ICD-9-CM   1. Chronic pain of both knees  M25.561 719.46    M25.562 338.29    G89.29    2. Leg pain, bilateral  M79.604 729.5    M79.605    3. Gait difficulty  R26.9 781.2     Subjective I did fine after the last visit.  No additional pain or anything.      Objective   See Exercise, Manual, and Modality Logs for complete treatment.     Assessment/Plan Pt did well with PT intervention and demonstrated the ability to perform effective box step up leading with R with minimal UE assist and Stand by supervision.     Progress per Plan of Care and Progress strengthening /stabilization /functional activity    Timed:         Manual Therapy:    -     mins  48464;     Therapeutic Exercise:    35     mins  61296;     Neuromuscular Ora:    8    mins  26199;    Therapeutic Activity:     -     mins  87693;     Gait Training:      -     mins  03256;     Ultrasound:     -     mins  19299;    Electrical Stimulation:    -     mins  53718 ( );  Dry Needling     -     mins self-pay  Traction     -     mins 47028      Timed Treatment:   43   mins   Total Treatment:     53   mins    LAURENCE Dubois License: [id number]

## 2022-09-12 ENCOUNTER — TREATMENT (OUTPATIENT)
Dept: PHYSICAL THERAPY | Facility: CLINIC | Age: 82
End: 2022-09-12

## 2022-09-12 DIAGNOSIS — F03.91 DEMENTIA WITH BEHAVIORAL DISTURBANCE, UNSPECIFIED DEMENTIA TYPE: ICD-10-CM

## 2022-09-12 DIAGNOSIS — H81.10 BPPV (BENIGN PAROXYSMAL POSITIONAL VERTIGO), UNSPECIFIED LATERALITY: Primary | ICD-10-CM

## 2022-09-12 DIAGNOSIS — R42 VERTIGO: ICD-10-CM

## 2022-09-12 PROCEDURE — 97530 THERAPEUTIC ACTIVITIES: CPT | Performed by: PHYSICAL THERAPIST

## 2022-09-12 PROCEDURE — 95992 CANALITH REPOSITIONING PROC: CPT | Performed by: PHYSICAL THERAPIST

## 2022-09-12 PROCEDURE — 97162 PT EVAL MOD COMPLEX 30 MIN: CPT | Performed by: PHYSICAL THERAPIST

## 2022-09-12 NOTE — PROGRESS NOTES
Physical Therapy Initial Evaluation and Plan of Care    Patient: Margaret Gu   : 1940  Diagnosis/ICD-10 Code:  BPPV (benign paroxysmal positional vertigo), unspecified laterality [H81.10]  Referring practitioner: Juliane Dye MD  Date of Initial Visit: 2022  Today's Date: 2022  Patient seen for 1 session         Visit Diagnoses:    ICD-10-CM ICD-9-CM   1. BPPV (benign paroxysmal positional vertigo), unspecified laterality  H81.10 386.11   2. Vertigo  R42 780.4   3. Dementia with behavioral disturbance, unspecified dementia type (HCC)  F03.91 294.21       PMHx Reviewed : 2022      Subjective Evaluation    History of Present Illness  Mechanism of injury: Pt presents to PT with a hx of vertigo with laying down at night and with activity at home like cleaning.   The pt reports that the symptoms returned in the last several months.    Pt has a hx of vertigo 6-7 yrs and what sounds like, was treated for BPPV.  Sounds like the Tx was successful.      The pt reports a pressure pressing down on top of her head but unable to specify the cause or if it is related to her vertigo.      The pt is a poor historian, but her  attended the visit with her to help hx.          Occupation:  Retired -   Activities:  Sedentary Lifestyle  PLOF: Independent  Medical Hx Reviewed.      Quality of life: fair    Social Support  Lives in: one-story house (garden home)  Lives with: spouse         Dizziness Handicap Inventory: 54% perceived handicap related to vertigo      Objective       Assessment & Plan     Assessment  Impairments: activity intolerance, impaired balance and safety issue  Functional Limitations: moving in bed, standing and reaching overhead  Assessment details: Pt presents to PT with symptoms consistent with BPPV.  Pt would benefit from skilled PT intervention to address the deficits noted.   The pt is confused, with frequent repetitious question and statements (present of  Dementia).    Explained the symptoms and pathology to patient and her .      S/P Rx provided pt was escorted to a chair with CGA to a full seated position.  Pt sat for 20 mins with head stationary.      Pt not to lay down or do activities that change head level beyond 45 degrees from upright until laying down for bed for the day.      Educated the pt to BPPV symptoms, anatomy, pathology and plan to treat.  Reviewed and answered questions at the end of the session.      Barriers to therapy: Dementia  Prognosis: fair    Goals  Plan Goals: SHORT TERM GOALS: Time for Goal: 3 weeks    1.  Pt reports that understand the information about BPPV and the treatment.  2.  Pt to understand, not to lay down the rest of the day secondary to BPPV Tx.    LONG TERM GOALS: Time for Goal: 6 months  1.  Pt to report absence of vertigo symptoms with all ADL's, IADL's, household, recreational and community activities, including all motions and positions.       Plan  Therapy options: will be seen for skilled therapy services  Planned therapy interventions: neuromuscular re-education and therapeutic activities  Other planned therapy interventions: Canalith repositioning, Vestibular Strengthening  Frequency: 2x week  Duration in weeks: 6  Treatment plan discussed with: patient  Plan details: If no improvement is seen with BPPV is seen, then an appropriate vestibular exercise program will be started.          Manual Therapy:          mins  50972;  Therapeutic Exercise:          mins  71301;     Neuromuscular Ora:         mins  17854;    Therapeutic Activity:      10     mins  30226;     Gait Training:            mins  92007;     Ultrasound:           mins  89062;    Electrical Stimulation:          mins  68726 ( );  Dry Needling           mins self-pay  Traction           mins 39935  Canalith Repositioning    15     mins 62548      Timed Treatment:   10   mins   Total Treatment:     60   mins      PT: Montana Cummins PT      License Number: [id number]  Electronically signed by Montana Cummins PT, 09/12/22, 11:50 AM EDT    Certification Period: 9/12/2022 thru 12/10/2022  I certify that the therapy services are furnished while this patient is under my care.  The services outlined above are required by this patient, and will be reviewed every 90 days.         Physician Signature:__________________________________________________    PHYSICIAN: Juliane Dye MD  NPI: [id number]                                      DATE:      Please sign in Epic or return via fax to [email or web address] . Thank you, [facility].

## 2022-09-28 ENCOUNTER — TREATMENT (OUTPATIENT)
Dept: PHYSICAL THERAPY | Facility: CLINIC | Age: 82
End: 2022-09-28

## 2022-09-28 ENCOUNTER — CLINICAL SUPPORT (OUTPATIENT)
Dept: CARDIOLOGY | Facility: CLINIC | Age: 82
End: 2022-09-28

## 2022-09-28 DIAGNOSIS — F03.91 DEMENTIA WITH BEHAVIORAL DISTURBANCE, UNSPECIFIED DEMENTIA TYPE: ICD-10-CM

## 2022-09-28 DIAGNOSIS — I48.21 PERMANENT ATRIAL FIBRILLATION: Primary | Chronic | ICD-10-CM

## 2022-09-28 DIAGNOSIS — H81.10 BPPV (BENIGN PAROXYSMAL POSITIONAL VERTIGO), UNSPECIFIED LATERALITY: Primary | ICD-10-CM

## 2022-09-28 DIAGNOSIS — R42 VERTIGO: ICD-10-CM

## 2022-09-28 LAB — INR PPP: 3.2 (ref 2–3)

## 2022-09-28 PROCEDURE — 36416 COLLJ CAPILLARY BLOOD SPEC: CPT | Performed by: INTERNAL MEDICINE

## 2022-09-28 PROCEDURE — 97530 THERAPEUTIC ACTIVITIES: CPT | Performed by: PHYSICAL THERAPIST

## 2022-09-28 PROCEDURE — 95992 CANALITH REPOSITIONING PROC: CPT | Performed by: PHYSICAL THERAPIST

## 2022-09-28 PROCEDURE — 85610 PROTHROMBIN TIME: CPT | Performed by: INTERNAL MEDICINE

## 2022-09-28 NOTE — PROGRESS NOTES
Physical Therapy Daily Treatment Note      Patient: Margaret Gu   : 1940  Referring practitioner: Juliane Dye MD  Date of Initial Visit: Type: THERAPY  Noted: 2022  Today's Date: 2022  Patient seen for 2 sessions       Visit Diagnoses:    ICD-10-CM ICD-9-CM   1. BPPV (benign paroxysmal positional vertigo), unspecified laterality  H81.10 386.11   2. Vertigo  R42 780.4   3. Dementia with behavioral disturbance, unspecified dementia type  F03.91 294.21         Margaret Gu reports: I feel like I am better with my dizziness symptoms.      Subjective     Objective   See Exercise, Manual, and Modality Logs for complete treatment.       Assessment & Plan     Assessment    Assessment details: Though the pt reports improvement of symptoms of vertigo, she is unable to quantify this when questioned.  She continues to have a symptomatic response to the DHP testing.  Treated with the (R) Epley today.  Reviewed the pathology of BPPV.      Plan  Plan details: Progress ROM / strengthening / stabilization / functional activity as tolerated            Timed:         Manual Therapy:         mins  99964;     Therapeutic Exercise:         mins  68829;     Neuromuscular Ora:        mins  55354;    Therapeutic Activity:     10     mins  88862;     Gait Training:           mins  99265;     Ultrasound:          mins  36196;    Ionto                                   mins  96933  Self Care                            mins  82911  Traction          mins 78460  Canalith Repos    15     mins 40258      Un-Timed:  Electrical Stimulation:         mins  05603 ( );  Dry Needling          mins self-pay  Traction          mins 45598      Timed Treatment:   10   mins   Total Treatment:     45   mins    Montana Cummins PT  [place] License #: [id number]    Physical Therapist

## 2022-10-03 DIAGNOSIS — E27.40 ADRENAL INSUFFICIENCY: ICD-10-CM

## 2022-10-03 RX ORDER — PREDNISONE 1 MG/1
TABLET ORAL
Qty: 90 TABLET | Refills: 1 | Status: SHIPPED | OUTPATIENT
Start: 2022-10-03

## 2022-10-10 ENCOUNTER — TREATMENT (OUTPATIENT)
Dept: PHYSICAL THERAPY | Facility: CLINIC | Age: 82
End: 2022-10-10

## 2022-10-10 DIAGNOSIS — R42 VERTIGO: ICD-10-CM

## 2022-10-10 DIAGNOSIS — H81.10 BPPV (BENIGN PAROXYSMAL POSITIONAL VERTIGO), UNSPECIFIED LATERALITY: Primary | ICD-10-CM

## 2022-10-10 PROCEDURE — 95992 CANALITH REPOSITIONING PROC: CPT | Performed by: PHYSICAL THERAPIST

## 2022-10-10 PROCEDURE — 97530 THERAPEUTIC ACTIVITIES: CPT | Performed by: PHYSICAL THERAPIST

## 2022-10-10 NOTE — PROGRESS NOTES
Physical Therapy Daily Treatment Note      Patient: Margaret Gu   : 1940  Referring practitioner: Juliane Dye MD  Date of Initial Visit: Type: THERAPY  Noted: 2022  Today's Date: 10/10/2022  Patient seen for 3 sessions       Visit Diagnoses:    ICD-10-CM ICD-9-CM   1. BPPV (benign paroxysmal positional vertigo), unspecified laterality  H81.10 386.11   2. Vertigo  R42 780.4         Margaret Gu reports: I feel a little better, but still having symptoms.      Subjective     Objective   See Exercise, Manual, and Modality Logs for complete treatment.       Assessment & Plan     Assessment    Assessment details: The pt had a change in significant sides for BPPV symptoms.  I consider this progress by the change in symptoms.      Had to thoroughly review the pathology and treatment with the pt secondary to her memory issues present.      Plan  Plan details: Re-assess upon next visit for continued BPPV symptoms and treat appropriately.             Timed:         Manual Therapy:         mins  45552;     Therapeutic Exercise:         mins  59668;     Neuromuscular Ora:        mins  71904;    Therapeutic Activity:     15     mins  94037;     Gait Training:           mins  66317;     Ultrasound:          mins  32534;    Ionto                                   mins  01018  Self Care                            mins  90162  Traction          mins 88744  Canalith Repos    15     mins 13611      Un-Timed:  Electrical Stimulation:         mins  66081 ( );  Dry Needling          mins self-pay  Traction          mins 01561      Timed Treatment:   15   mins   Total Treatment:     50   mins    Montana Cummins PT  [place] License #: [id number]    Physical Therapist

## 2022-10-12 ENCOUNTER — CLINICAL SUPPORT (OUTPATIENT)
Dept: CARDIOLOGY | Facility: CLINIC | Age: 82
End: 2022-10-12

## 2022-10-12 DIAGNOSIS — I48.21 PERMANENT ATRIAL FIBRILLATION: Primary | Chronic | ICD-10-CM

## 2022-10-12 LAB — INR PPP: 3.3 (ref 0.9–1.1)

## 2022-10-12 PROCEDURE — 85610 PROTHROMBIN TIME: CPT | Performed by: INTERNAL MEDICINE

## 2022-10-12 PROCEDURE — 36416 COLLJ CAPILLARY BLOOD SPEC: CPT | Performed by: INTERNAL MEDICINE

## 2022-10-19 ENCOUNTER — CLINICAL SUPPORT (OUTPATIENT)
Dept: CARDIOLOGY | Facility: CLINIC | Age: 82
End: 2022-10-19

## 2022-10-19 ENCOUNTER — TREATMENT (OUTPATIENT)
Dept: PHYSICAL THERAPY | Facility: CLINIC | Age: 82
End: 2022-10-19

## 2022-10-19 DIAGNOSIS — H81.10 BPPV (BENIGN PAROXYSMAL POSITIONAL VERTIGO), UNSPECIFIED LATERALITY: Primary | ICD-10-CM

## 2022-10-19 DIAGNOSIS — I48.21 PERMANENT ATRIAL FIBRILLATION: Primary | Chronic | ICD-10-CM

## 2022-10-19 DIAGNOSIS — R42 VERTIGO: ICD-10-CM

## 2022-10-19 LAB — INR PPP: 2.5 (ref 0.9–1.1)

## 2022-10-19 PROCEDURE — 36416 COLLJ CAPILLARY BLOOD SPEC: CPT | Performed by: INTERNAL MEDICINE

## 2022-10-19 PROCEDURE — 97530 THERAPEUTIC ACTIVITIES: CPT | Performed by: PHYSICAL THERAPIST

## 2022-10-19 PROCEDURE — 85610 PROTHROMBIN TIME: CPT | Performed by: INTERNAL MEDICINE

## 2022-10-19 PROCEDURE — 95992 CANALITH REPOSITIONING PROC: CPT | Performed by: PHYSICAL THERAPIST

## 2022-10-19 NOTE — PROGRESS NOTES
"Physical Therapy Daily Treatment Note      Patient: Margaret Gu   : 1940  Referring practitioner: Juliane Dye MD  Date of Initial Visit: Type: THERAPY  Noted: 2022  Today's Date: 10/19/2022  Patient seen for 4 sessions       Visit Diagnoses:    ICD-10-CM ICD-9-CM   1. BPPV (benign paroxysmal positional vertigo), unspecified laterality  H81.10 386.11   2. Vertigo  R42 780.4         Margaret Gu reports: I am feeling better with my dizziness symptoms.  I notice the dizziness mainly in the mornings when getting out of bed.   Her  reports that she doesn't complain of vertigo as much at this point but a pressure (pointing to her sinus region).      \"My (R) leg pain is really hurting and no one knows what's going on with it.\"       Subjective     Objective   See vestibular and exercise Logs for complete treatment.     See Vestibular Section of Logs for testing and Rx.    S/P Rx provided pt was escorted to a chair with CGA to a full seated position.  Pt sat for 20 mins with head stationary.      Pt not to lay down or do activities that change head level beyond 45 degrees from upright until laying down for bed for the day.        Assessment & Plan     Assessment    Assessment details: The pt continues to demo symptoms of BPPV by subjective report and her testing with DHP.  Her dementia seems to be complicating her treatment because of her report of symptoms.  I am treating what I see with the testing of the DHP.  The pt reported that she felt better after the Epley Tx and sitting for 20 mins with less vertigo symptoms.      Her reporting of the sinus pressure seems to be much more a focus of conversation today by the pt and her .  If she is having sinus pressure, then that could be a major contributor to the cause of her BPPV.  I think that a OTC anti-histamine may be indicated.  Reached out to Juliane Dye MD her PCP to see if she is ok with the suggestion of the OTC drug.  "     Pt was more confused today and visibly upset easily today.  Reporting she is upset by her memory issue and (R) leg pain.  She reports that she was feeling better with her vertigo and then immediate told her  that she is so dizzy  (she is constantly repeating her statements and concerns, but she is appropriate to conversation.).  We reviewed the BPPV pathology and plan of care several times at the patients request.        Plan  Plan details: Re-assess upon next visit for continued BPPV symptoms and treat appropriately.             Timed:         Manual Therapy:         mins  76751;     Therapeutic Exercise:        mins  84703;     Neuromuscular Ora:        mins  13253;    Therapeutic Activity:     15     mins  68515;     Gait Training:           mins  29158;     Ultrasound:          mins  62813;    Ionto                                   mins  78069  Self Care                            mins  49088  Traction          mins 60939  Canalith Repos    15     mins 94027      Un-Timed:  Electrical Stimulation:         mins  43283 ( );  Dry Needling          mins self-pay  Traction          mins 53477      Timed Treatment:   15   mins   Total Treatment:     50   mins    Montana Cummins PT  [place] License #: [id number]    Physical Therapist

## 2022-10-31 ENCOUNTER — TELEPHONE (OUTPATIENT)
Dept: NEUROLOGY | Facility: CLINIC | Age: 82
End: 2022-10-31

## 2022-10-31 ENCOUNTER — TELEPHONE (OUTPATIENT)
Dept: INTERNAL MEDICINE | Facility: CLINIC | Age: 82
End: 2022-10-31

## 2022-10-31 DIAGNOSIS — G31.84 MILD COGNITIVE IMPAIRMENT: Primary | ICD-10-CM

## 2022-10-31 NOTE — TELEPHONE ENCOUNTER
Caller: HA CANTU    Relationship: Emergency Contact    Best call back number: [phone]    What is the best time to reach you:ANY     Who are you requesting to speak with (clinical staff, provider,  specific staff member): CLINICAL    Do you know the name of the person who called:     What was the call regarding: PATIENT'S DAUGHTER CALLED TO SEE IF PCP GARRETT NAVARRO CAN HELP HER WITH GETTING HER MOTHER INTO A 24 NURSING HOME. PATIENT IS HAVING MEMORY LOSS. PLEASE CALL AND ADVISE PATIENT WHAT SHE NEEDS TO DO.    Do you require a callback: YES

## 2022-10-31 NOTE — TELEPHONE ENCOUNTER
Provider: YOEL BOJORQUEZ  Caller: HA CANTU  Relationship to Patient: DAUGHTER, NOT ON [facility]  Pharmacy: N/A  Phone Number: [phone]  Reason for Call: PATIENT'S DAUGHTER IS NEEDING TO PLACE PATIENT IN A 24 HOUR CARE FACILITY AND IS WANTING TO KNOW IF THERE IS ANY PAPERWORK THAT YOEL CAN SUPPLY THAT CAN HELP GET THE PATIENT INTO 24 HOUR CARE. PATIENT'S CONDITION HAS WORSEN.    PLEASE CALL AND ADVISE    THANK YOU    When was the patient last seen: 11/16/2021

## 2022-10-31 NOTE — TELEPHONE ENCOUNTER
Spoke with patient's daughter and informed her that we do not arrange nursing home placement.  Once she finds a facility that she wants to place her in, if they require documents from us we will provide

## 2022-10-31 NOTE — TELEPHONE ENCOUNTER
I can put in a referral to the social workers at [facility] for their help- they can contact the daughter directly.

## 2022-11-01 ENCOUNTER — REFERRAL TRIAGE (OUTPATIENT)
Dept: CASE MANAGEMENT | Facility: OTHER | Age: 82
End: 2022-11-01

## 2022-11-01 ENCOUNTER — PATIENT OUTREACH (OUTPATIENT)
Dept: CASE MANAGEMENT | Facility: OTHER | Age: 82
End: 2022-11-01

## 2022-11-01 DIAGNOSIS — G31.84 MILD COGNITIVE IMPAIRMENT: Primary | ICD-10-CM

## 2022-11-01 NOTE — OUTREACH NOTE
Social Work Assessment  Questions/Answers    Flowsheet Row Most Recent Value   Referral Source physician   Reason for Consult community resources, facility placement   Preferred Language English   Advance Care Planning Reviewed verified with patient   People in Home spouse   Current Living Arrangements home   Primary Care Provided by self, spouse/significant other, child(dulce)   Provides Primary Care For no one, unable/limited ability to care for self   Quality of Family Relationships helpful, supportive   Source of Income social security   Usual Activity Tolerance fair   Current Activity Tolerance fair        SDOH updated and reviewed with the patient during this program:  Financial Resource Strain: Low Risk    • Difficulty of Paying Living Expenses: Not hard at all      Food Insecurity: No Food Insecurity   • Worried About Running Out of Food in the Last Year: Never true   • Ran Out of Food in the Last Year: Never true      Transportation Needs: No Transportation Needs   • Lack of Transportation (Medical): No   • Lack of Transportation (Non-Medical): No      Housing Stability: Low Risk    • Unable to Pay for Housing in the Last Year: No   • Number of Places Lived in the Last Year: 1   • Unstable Housing in the Last Year: No      Continuing Care   [facility] & [address] [address]    Phone: [phone]   [place] [address]    Phone: [phone]   [facility]    [id number]    Phone: [phone]   [place] [address]    Phone: [phone]   [address] [address]    Phone: [phone]      Patient Outreach    MSW outreach to patient's daughter to discuss options for placement for her mother. MSW discussed requirements of Medicare and Medicaid to be placed into a facility or  nursing home. Patient's daughter states that her mother and  will likely be able to private pay at facility for some time. MSW discussed process of applying for long term care Medicaid and working with facility for application for Medicaid when time is right. MSW and patient's daughter discussed different memory care facilities in [place] for her mother and provided contact information as needed. Patient's daughter will begin contacting and touring facilities. Patient's daughter states mother has power of  paperwork completed. No additional resources needed for housing, financial, food, or transportation at this time. Patient's daughter to contact MSW as needed for resources.    RAYMUNDO ROSARIO -   Ambulatory Case Management    11/1/2022, 11:51 EDT

## 2022-11-10 ENCOUNTER — TELEPHONE (OUTPATIENT)
Dept: INTERNAL MEDICINE | Facility: CLINIC | Age: 82
End: 2022-11-10

## 2022-11-10 ENCOUNTER — OFFICE VISIT (OUTPATIENT)
Dept: INTERNAL MEDICINE | Facility: CLINIC | Age: 82
End: 2022-11-10

## 2022-11-10 VITALS — WEIGHT: 165 LBS | BODY MASS INDEX: 30.36 KG/M2 | HEIGHT: 62 IN

## 2022-11-10 DIAGNOSIS — E27.40 ADRENAL INSUFFICIENCY: Chronic | ICD-10-CM

## 2022-11-10 DIAGNOSIS — F03.B0 MODERATE DEMENTIA WITHOUT BEHAVIORAL DISTURBANCE, PSYCHOTIC DISTURBANCE, MOOD DISTURBANCE, OR ANXIETY, UNSPECIFIED DEMENTIA TYPE: Primary | Chronic | ICD-10-CM

## 2022-11-10 PROCEDURE — 99214 OFFICE O/P EST MOD 30 MIN: CPT | Performed by: INTERNAL MEDICINE

## 2022-11-10 NOTE — PROGRESS NOTES
"Chief Complaint  Memory Loss    Subjective        Margaret Gu presents to [facility]  History of Present Illness    Here with daughter- pts  had a fall and is in nursing home- not sure length of stay.  In the meantime, they are going to move pt to a memory care unit.   He memory has gotten worse and she is unable to stay alone.  She has no complaints  Objective   Vital Signs:  Ht 157.5 cm (62\")   Wt 74.8 kg (165 lb)   BMI 30.18 kg/m²   Estimated body mass index is 30.18 kg/m² as calculated from the following:    Height as of this encounter: 157.5 cm (62\").    Weight as of this encounter: 74.8 kg (165 lb).          Physical Exam  Constitutional:       General: She is not in acute distress.     Appearance: Normal appearance.   Cardiovascular:      Rate and Rhythm: Normal rate.   Pulmonary:      Effort: Pulmonary effort is normal.   Neurological:      Mental Status: Mental status is at baseline.        Result Review :                Assessment and Plan   Diagnoses and all orders for this visit:    1. Moderate dementia without behavioral disturbance, psychotic disturbance, mood disturbance, or anxiety, unspecified dementia type (Primary)  Comments:  she is agreeable to placement- paperwork complete, questions answered. Recheck prn.    2. Adrenal insufficiency (HCC)  Comments:  to continue prednisone daily             Follow Up   No follow-ups on file.  Patient was given instructions and counseling regarding her condition or for health maintenance advice. Please see specific information pulled into the AVS if appropriate.       "

## 2022-11-10 NOTE — TELEPHONE ENCOUNTER
Caller: HA CANTU    Relationship: Emergency Contact    Best call back number: [phone]    What medications are you currently taking:   Current Outpatient Medications on File Prior to Visit   Medication Sig Dispense Refill   • furosemide (LASIX) 20 MG tablet Take 1 tablet by mouth Daily. 90 tablet 3   • Magnesium 500 MG tablet Take 500 mg by mouth Daily. 30 tablet 3   • memantine (NAMENDA) 10 MG tablet Take 1 tablet by mouth 2 (Two) Times a Day. 180 tablet 3   • metoprolol succinate XL (TOPROL-XL) 100 MG 24 hr tablet Take 1 tablet by mouth Daily. 90 tablet 3   • potassium chloride (KLOR-CON) 10 MEQ CR tablet Take 1 tablet by mouth Daily. 90 tablet 3   • predniSONE (DELTASONE) 5 MG tablet TAKE 1 TABLET EVERY DAY 90 tablet 1   • traZODone (DESYREL) 100 MG tablet Take 1 tablet by mouth Every Night. 90 tablet 1   • VITAMIN D PO Take 1,500 mg by mouth Daily.     • warfarin (COUMADIN) 5 MG tablet Take 1 tablet by mouth Every Night. 90 tablet 1   • [DISCONTINUED] donepezil (Aricept) 10 MG tablet Take 1 tablet by mouth Every Night. 90 tablet 3   • [DISCONTINUED] montelukast (SINGULAIR) 10 MG tablet Take 1 tablet by mouth Every Night. 90 tablet 3     Current Facility-Administered Medications on File Prior to Visit   Medication Dose Route Frequency Provider Last Rate Last Admin   • cyanocobalamin injection 1,000 mcg  1,000 mcg Intramuscular Q28 Days Sena Pedersen APRN   1,000 mcg at 08/19/21 1014          What are your concerns: PATIENT'S DAUGHTER REQUESTS A CALL BACK TO VERIFY HER CURRENT MEDICATION LIST AND ANY RECENT CHANGES

## 2022-11-14 ENCOUNTER — ANTICOAGULATION VISIT (OUTPATIENT)
Dept: PHARMACY | Facility: HOSPITAL | Age: 82
End: 2022-11-14

## 2022-11-14 NOTE — PROGRESS NOTES
This visit is for documentation purposes only: Patient's Cardiologist is no longer with [facility]. Resolving existing anticoagulation episode to allow for new referral once established with new Cardiologist.

## 2022-12-14 ENCOUNTER — APPOINTMENT (OUTPATIENT)
Dept: GENERAL RADIOLOGY | Facility: HOSPITAL | Age: 82
End: 2022-12-14

## 2022-12-14 ENCOUNTER — APPOINTMENT (OUTPATIENT)
Dept: CT IMAGING | Facility: HOSPITAL | Age: 82
End: 2022-12-14

## 2022-12-14 ENCOUNTER — APPOINTMENT (OUTPATIENT)
Dept: CARDIOLOGY | Facility: HOSPITAL | Age: 82
End: 2022-12-14

## 2022-12-14 ENCOUNTER — HOSPITAL ENCOUNTER (EMERGENCY)
Facility: HOSPITAL | Age: 82
Discharge: NURSING, REHAB OR LONG-TERM CARE | End: 2022-12-14
Attending: EMERGENCY MEDICINE | Admitting: EMERGENCY MEDICINE

## 2022-12-14 VITALS
SYSTOLIC BLOOD PRESSURE: 149 MMHG | RESPIRATION RATE: 16 BRPM | TEMPERATURE: 96.9 F | HEIGHT: 64 IN | OXYGEN SATURATION: 96 % | BODY MASS INDEX: 28.32 KG/M2 | DIASTOLIC BLOOD PRESSURE: 90 MMHG | HEART RATE: 79 BPM

## 2022-12-14 DIAGNOSIS — M79.604 MUSCULOSKELETAL PAIN OF RIGHT LOWER EXTREMITY: Primary | ICD-10-CM

## 2022-12-14 LAB
ALBUMIN SERPL-MCNC: 4.1 G/DL (ref 3.5–5.2)
ALBUMIN/GLOB SERPL: 1.6 G/DL
ALP SERPL-CCNC: 91 U/L (ref 39–117)
ALT SERPL W P-5'-P-CCNC: 15 U/L (ref 1–33)
ANION GAP SERPL CALCULATED.3IONS-SCNC: 11 MMOL/L (ref 5–15)
APTT PPP: 36.1 SECONDS (ref 22.7–35.4)
AST SERPL-CCNC: 22 U/L (ref 1–32)
BASOPHILS # BLD AUTO: 0.06 10*3/MM3 (ref 0–0.2)
BASOPHILS NFR BLD AUTO: 1 % (ref 0–1.5)
BH CV LOWER VASCULAR LEFT COMMON FEMORAL AUGMENT: NORMAL
BH CV LOWER VASCULAR LEFT COMMON FEMORAL COMPETENT: NORMAL
BH CV LOWER VASCULAR LEFT COMMON FEMORAL COMPRESS: NORMAL
BH CV LOWER VASCULAR LEFT COMMON FEMORAL PHASIC: NORMAL
BH CV LOWER VASCULAR LEFT COMMON FEMORAL SPONT: NORMAL
BH CV LOWER VASCULAR RIGHT COMMON FEMORAL AUGMENT: NORMAL
BH CV LOWER VASCULAR RIGHT COMMON FEMORAL COMPETENT: NORMAL
BH CV LOWER VASCULAR RIGHT COMMON FEMORAL COMPRESS: NORMAL
BH CV LOWER VASCULAR RIGHT COMMON FEMORAL PHASIC: NORMAL
BH CV LOWER VASCULAR RIGHT COMMON FEMORAL SPONT: NORMAL
BH CV LOWER VASCULAR RIGHT DISTAL FEMORAL COMPRESS: NORMAL
BH CV LOWER VASCULAR RIGHT GASTRONEMIUS COMPRESS: NORMAL
BH CV LOWER VASCULAR RIGHT GREATER SAPH AK COMPRESS: NORMAL
BH CV LOWER VASCULAR RIGHT GREATER SAPH BK COMPRESS: NORMAL
BH CV LOWER VASCULAR RIGHT LESSER SAPH COMPRESS: NORMAL
BH CV LOWER VASCULAR RIGHT MID FEMORAL AUGMENT: NORMAL
BH CV LOWER VASCULAR RIGHT MID FEMORAL COMPETENT: NORMAL
BH CV LOWER VASCULAR RIGHT MID FEMORAL COMPRESS: NORMAL
BH CV LOWER VASCULAR RIGHT MID FEMORAL PHASIC: NORMAL
BH CV LOWER VASCULAR RIGHT MID FEMORAL SPONT: NORMAL
BH CV LOWER VASCULAR RIGHT PERONEAL COMPRESS: NORMAL
BH CV LOWER VASCULAR RIGHT POPLITEAL AUGMENT: NORMAL
BH CV LOWER VASCULAR RIGHT POPLITEAL COMPETENT: NORMAL
BH CV LOWER VASCULAR RIGHT POPLITEAL COMPRESS: NORMAL
BH CV LOWER VASCULAR RIGHT POPLITEAL PHASIC: NORMAL
BH CV LOWER VASCULAR RIGHT POPLITEAL SPONT: NORMAL
BH CV LOWER VASCULAR RIGHT POSTERIOR TIBIAL COMPRESS: NORMAL
BH CV LOWER VASCULAR RIGHT PROFUNDA FEMORAL COMPRESS: NORMAL
BH CV LOWER VASCULAR RIGHT PROXIMAL FEMORAL COMPRESS: NORMAL
BH CV LOWER VASCULAR RIGHT SAPHENOFEMORAL JUNCTION COMPRESS: NORMAL
BH CV VAS PRELIMINARY FINDINGS SCRIPTING: 1
BILIRUB SERPL-MCNC: 1.1 MG/DL (ref 0–1.2)
BUN SERPL-MCNC: 11 MG/DL (ref 8–23)
BUN/CREAT SERPL: 12.4 (ref 7–25)
CALCIUM SPEC-SCNC: 9.3 MG/DL (ref 8.6–10.5)
CHLORIDE SERPL-SCNC: 101 MMOL/L (ref 98–107)
CK SERPL-CCNC: 46 U/L (ref 20–180)
CO2 SERPL-SCNC: 31 MMOL/L (ref 22–29)
CREAT SERPL-MCNC: 0.89 MG/DL (ref 0.57–1)
DEPRECATED RDW RBC AUTO: 44.7 FL (ref 37–54)
EGFRCR SERPLBLD CKD-EPI 2021: 64.4 ML/MIN/1.73
EOSINOPHIL # BLD AUTO: 0.13 10*3/MM3 (ref 0–0.4)
EOSINOPHIL NFR BLD AUTO: 2.1 % (ref 0.3–6.2)
ERYTHROCYTE [DISTWIDTH] IN BLOOD BY AUTOMATED COUNT: 12.9 % (ref 12.3–15.4)
GLOBULIN UR ELPH-MCNC: 2.5 GM/DL
GLUCOSE SERPL-MCNC: 92 MG/DL (ref 65–99)
HCT VFR BLD AUTO: 41 % (ref 34–46.6)
HGB BLD-MCNC: 13.7 G/DL (ref 12–15.9)
INR PPP: 2.19 (ref 0.9–1.1)
LYMPHOCYTES # BLD AUTO: 1.21 10*3/MM3 (ref 0.7–3.1)
LYMPHOCYTES NFR BLD AUTO: 19.7 % (ref 19.6–45.3)
MAGNESIUM SERPL-MCNC: 2 MG/DL (ref 1.6–2.4)
MAXIMAL PREDICTED HEART RATE: 137 BPM
MCH RBC QN AUTO: 30.4 PG (ref 26.6–33)
MCHC RBC AUTO-ENTMCNC: 33.4 G/DL (ref 31.5–35.7)
MCV RBC AUTO: 91.1 FL (ref 79–97)
MONOCYTES # BLD AUTO: 0.62 10*3/MM3 (ref 0.1–0.9)
MONOCYTES NFR BLD AUTO: 10.1 % (ref 5–12)
NEUTROPHILS NFR BLD AUTO: 4.08 10*3/MM3 (ref 1.7–7)
NEUTROPHILS NFR BLD AUTO: 66.4 % (ref 42.7–76)
PLATELET # BLD AUTO: 136 10*3/MM3 (ref 140–450)
PMV BLD AUTO: 11.4 FL (ref 6–12)
POTASSIUM SERPL-SCNC: 3.6 MMOL/L (ref 3.5–5.2)
PROT SERPL-MCNC: 6.6 G/DL (ref 6–8.5)
PROTHROMBIN TIME: 24.6 SECONDS (ref 11.7–14.2)
QT INTERVAL: 399 MS
RBC # BLD AUTO: 4.5 10*6/MM3 (ref 3.77–5.28)
SODIUM SERPL-SCNC: 143 MMOL/L (ref 136–145)
STRESS TARGET HR: 116 BPM
TROPONIN T SERPL-MCNC: <0.01 NG/ML (ref 0–0.03)
WBC NRBC COR # BLD: 6.14 10*3/MM3 (ref 3.4–10.8)

## 2022-12-14 PROCEDURE — 85025 COMPLETE CBC W/AUTO DIFF WBC: CPT | Performed by: PHYSICIAN ASSISTANT

## 2022-12-14 PROCEDURE — 93971 EXTREMITY STUDY: CPT

## 2022-12-14 PROCEDURE — 83735 ASSAY OF MAGNESIUM: CPT | Performed by: PHYSICIAN ASSISTANT

## 2022-12-14 PROCEDURE — 0 IOPAMIDOL PER 1 ML: Performed by: EMERGENCY MEDICINE

## 2022-12-14 PROCEDURE — 99283 EMERGENCY DEPT VISIT LOW MDM: CPT

## 2022-12-14 PROCEDURE — 84484 ASSAY OF TROPONIN QUANT: CPT | Performed by: PHYSICIAN ASSISTANT

## 2022-12-14 PROCEDURE — 73552 X-RAY EXAM OF FEMUR 2/>: CPT

## 2022-12-14 PROCEDURE — 71275 CT ANGIOGRAPHY CHEST: CPT

## 2022-12-14 PROCEDURE — 85610 PROTHROMBIN TIME: CPT | Performed by: PHYSICIAN ASSISTANT

## 2022-12-14 PROCEDURE — 85730 THROMBOPLASTIN TIME PARTIAL: CPT | Performed by: PHYSICIAN ASSISTANT

## 2022-12-14 PROCEDURE — 93005 ELECTROCARDIOGRAM TRACING: CPT | Performed by: PHYSICIAN ASSISTANT

## 2022-12-14 PROCEDURE — 93010 ELECTROCARDIOGRAM REPORT: CPT | Performed by: INTERNAL MEDICINE

## 2022-12-14 PROCEDURE — 82550 ASSAY OF CK (CPK): CPT | Performed by: PHYSICIAN ASSISTANT

## 2022-12-14 PROCEDURE — 80053 COMPREHEN METABOLIC PANEL: CPT | Performed by: PHYSICIAN ASSISTANT

## 2022-12-14 RX ORDER — MONTELUKAST SODIUM 10 MG/1
10 TABLET ORAL NIGHTLY
COMMUNITY

## 2022-12-14 RX ORDER — CHOLECALCIFEROL (VITAMIN D3) 125 MCG
5 CAPSULE ORAL NIGHTLY
COMMUNITY

## 2022-12-14 RX ORDER — TRAZODONE HYDROCHLORIDE 50 MG/1
25 TABLET ORAL EVERY 6 HOURS PRN
COMMUNITY

## 2022-12-14 RX ORDER — MENTHOL 40 MG/ML
1 GEL TOPICAL EVERY 6 HOURS PRN
COMMUNITY

## 2022-12-14 RX ORDER — ACETAMINOPHEN 325 MG/1
650 TABLET ORAL EVERY 6 HOURS PRN
COMMUNITY

## 2022-12-14 RX ORDER — DIVALPROEX SODIUM 125 MG/1
125 TABLET, DELAYED RELEASE ORAL 2 TIMES DAILY
COMMUNITY

## 2022-12-14 RX ADMIN — IOPAMIDOL 95 ML: 755 INJECTION, SOLUTION INTRAVENOUS at 12:11

## 2022-12-14 NOTE — ED PROVIDER NOTES
I supervised care provided by the midlevel provider.   We have discussed this patient's history, physical exam, and treatment plan.  I have reviewed the note and personally saw and examined the patient and agree with the plan of care.   This is a patient that is demented and talked with the patient as well as the daughter.  She is acting at baseline.  She was sent here to the emergency department because of DVT in her right lower extremity.  It was a proximal DVT that also was distal.  She is on Coumadin for atrial fibrillation.  Is important to note that her levels for Coumadin have been subtherapeutic in the levels of 1.4 and 1.3 from the end of November to the first part of December.  Her Coumadin level on December 12 was 2.5.  Patient is a very poor historian because of the dementia.  Denies any chest pain or shortness of breath currently but might of had some episodes of chest pain over the past several days.  There is no obvious fevers or chills.  She is mainly complained of some pain in her right lower extremity.  It seems to be more on the back portion of her leg and just above her knee in the popliteal region and a little superior.  There is no fevers or chills.  No abdominal pain.    GENERAL: Elderly female this pleasant.  Not distressed  HENT: nares patent  Head/neck/ face are symmetric without gross deformity or swelling  EYES: no scleral icterus  CV: regular rhythm, regular rate with intact distal pulses.  Systolic murmur 2 out of 6  RESPIRATORY: normal effort and no respiratory distress  ABDOMEN: soft and nontender  MUSCULOSKELETAL: Mild swelling in the right lower extremity compared to the left.  There is no coolness or cyanosis she has intact distal pulses.  Location of the pain is in the popliteal region a little superior.  Is present with palpation and raising her right leg off the bed.  She is neurovascularly intact.  NEURO: alert and appropriate, moves all extremities, follows commands.  No focal  motor or sensory changes  SKIN: warm, dry    Vital signs and nursing notes reviewed.    Plan will be to repeat a Doppler of the right lower extremity.  We will check lab work on her.  CT scan of the chest is also been ordered to make sure she did not have a pulmonary embolism.  I really would not cause of this a Coumadin failure given the persistent low levels of the INR.  This is more likely a clot that developed.  She has had subtherapeutic INR.  I discussed the plan with the patient as well as daughter.  All questions answered at this time    ED Course as of 12/14/22 1627   Wed Dec 14, 2022   0935 Reviewed records sent from [facility] or patient is living.  Ultrasound of the right lower extremity on 12/13/2022 shows an acute right lower extremity DVT involving the right common through distal superficial femoral veins.  Additionally shows an acute right lower extremity superficial venous thrombosis involving the right greater saphenous vein near the saphenofemoral junction.  Most recent PT/INRs show patient was subtherapeutic on 11/28 at 1.4, 12/2 at 1.3, 12/8 at 1.7.  PT/INR from 12/12/2022 was 2.5. [DC]   1017 EKG shows rate controlled atrial fibrillation [DC]   1019 Discussed case with Dr. Feliz, attending physician, who like to repeat ultrasound of right lower extremity. [DC]   1046 EKG reading  EKG was done at 1009  Its rate of 84 it is atrial fibrillation with narrow complex and normal axis I do not appreciate any acute injury pattern but there is diffuse T wave changes in multiple leads  QT looks normal  Compared to the previous EKG and it looks fairly similar.  The previous EKG was done on October 23, 2020. [MM]   1100 INR(!): 2.19 [DC]   1110 Ultrasound  called stating preliminary result of right lower extremity ultrasound was negative for DVT. [DC]   1112 Troponin T: <0.010 [DC]   1136 Will obtain xray of femur due to leg pain with no DVT noted on repeat ultrasound. [DC]   1138 Case  management states inability to walk would not prevent patient from being discharged back to nursing home. [DC]   9015 Discussed at length patient's imaging and lab results.  Plan to discharge back to nursing home.  Will recommend PT eval and close PCP follow-up for further treatment as needed.  ER return precautions were discussed. [DC]      ED Course User Index  [DC] Galina Coleman PA  [MM] Montana Feliz MD     We are currently under a pandemic from the COVID19 infection.  The patient presented to the emergency department by ambulance or personal vehicle. I followed the current protocols required by Infection Control at [facility] in my evaluation and treatment of the patient. The patient was wearing a face mask during my evaluation and throughout my encounter. During my whole encounter with this patient I used appropriate personal protective equipment.  This equipment consisted of eye protection, facemask, gown, and gloves.  I applied this equipment before entering the room.           Montana Feliz MD  12/14/22 2838

## 2022-12-14 NOTE — ED PROVIDER NOTES
EMERGENCY DEPARTMENT ENCOUNTER    Room Number:  01/01  Date seen:  12/14/2022  PCP: Santhosh Rubio MD  Historian: Daughter, patient      HPI:  Chief Complaint: Leg pain, DVT  A complete HPI/ROS/PMH/PSH/SH/FH are unobtainable due to: None  Context: Margaret Gu is a 82 y.o. female who presents to the ED with daughter from [facility] for c/o right lower extremity pain with ultrasound showing acute DVT.  Patient has been having increased pain and inability to put weight on right lower extremity so facility had ultrasound performed yesterday.  Ultrasound report this morning shows acute DVT and superficial thrombosis.  Patient has occasional shortness of breath but does not feel this is necessarily worse than baseline.  She is taking Coumadin for history of permanent atrial fibrillation.  Patient denies chest pain.            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   • Iatrogenic hyperthyroidism 10/22/2020   • GERD (gastroesophageal reflux disease)    • COPD (chronic obstructive pulmonary disease) ([facility])    • History of mitral valve repair 12/10/2020   • History of tricuspid valve repair 12/10/2020   • History of cardiac radiofrequency ablation 12/10/2020   • History of cardiac catheterization 12/10/2020   • LV dysfunction 12/10/2020   • B12 deficiency 04/09/2021   • Permanent atrial fibrillation (HCC) 08/17/2022     Resolved Ambulatory Problems     Diagnosis Date Noted   • Persistent atrial fibrillation (HCC) 10/22/2020   • Sleep apnea      Past Medical History:   Diagnosis Date   • Anxiety    • Arthritis    • Asthma    • Atrial fibrillation (HCC)    •  CHF (congestive heart failure) (HCC)    • Colon tumor    • Dementia (HCC)    • Difficulty walking    • Goiter    • Headache, tension-type    • Hypertension    • Hypocalcemia    • Hypokalemia    • Memory loss    • Migraine    • Shortness of breath          PAST SURGICAL HISTORY  Past Surgical History:   Procedure Laterality Date   • APPENDECTOMY     • BACK SURGERY      6 back surgeries   • BREAST SURGERY     • CARDIAC ABLATION     • CARDIAC ABLATION  2013   • CATARACT EXTRACTION, BILATERAL Bilateral    • CHOLECYSTECTOMY     • HYSTERECTOMY     • JOINT REPLACEMENT Bilateral     shoulder   • MITRAL VALVE REPAIR/REPLACEMENT  2011   • OOPHORECTOMY     • TONSILLECTOMY AND ADENOIDECTOMY     • TRICUSPID VALVE SURGERY  2011    Repair         FAMILY HISTORY  Family History   Problem Relation Age of Onset   • Dementia Mother    • Cancer Father          SOCIAL HISTORY  Social History     Socioeconomic History   • Marital status:    Tobacco Use   • Smoking status: Never   • Smokeless tobacco: Never   Vaping Use   • Vaping Use: Never used   Substance and Sexual Activity   • Alcohol use: Not Currently   • Drug use: Never   • Sexual activity: Defer         ALLERGIES  Chlorhexidine, Dilaudid [hydromorphone hcl], Ace inhibitors, Bactrim [sulfamethoxazole-trimethoprim], Chloraprep one step [chlorhexidine gluconate], Ciprofloxacin, Codeine, Keflex [cephalexin], Latex, and Penicillins        REVIEW OF SYSTEMS  Review of Systems   Constitutional: Negative for chills and fever.   Respiratory: Positive for shortness of breath (Occasional).    Cardiovascular: Negative for chest pain.   Musculoskeletal:        Right leg pain   Skin: Negative for color change.   Neurological: Negative for weakness and numbness.            PHYSICAL EXAM  ED Triage Vitals   Temp Heart Rate Resp BP SpO2   12/14/22 0911 12/14/22 0911 12/14/22 0911 12/14/22 0921 12/14/22 0911   96.9 °F (36.1 °C) 67 16 147/86 94 %      Temp src Heart Rate Source Patient  Position BP Location FiO2 (%)   12/14/22 0911 -- -- -- --   Tympanic           Physical Exam      GENERAL: Elderly, well developed, no acute distress  HENT: nares patent, moist mucous membranes  EYES: no scleral icterus  CV: regular rhythm, normal rate  RESPIRATORY: normal effort  ABDOMEN: soft, nontender  MUSCULOSKELETAL: No pitting edema, pain with palpation to right calf and right thigh, DP pulse 2+, no significant erythema, no deformity, increased pain with active raising of RLE, no pain with passive raising of RLE  NEURO: alert, moves all extremities, follows commands  PSYCH:  calm, cooperative  SKIN: warm, dry    Vital signs and nursing notes reviewed.          LAB RESULTS  Recent Results (from the past 24 hour(s))   aPTT    Collection Time: 12/14/22  9:55 AM    Specimen: Blood   Result Value Ref Range    PTT 36.1 (H) 22.7 - 35.4 seconds   Protime-INR    Collection Time: 12/14/22  9:55 AM    Specimen: Blood   Result Value Ref Range    Protime 24.6 (H) 11.7 - 14.2 Seconds    INR 2.19 (H) 0.90 - 1.10   Comprehensive Metabolic Panel    Collection Time: 12/14/22  9:55 AM    Specimen: Blood   Result Value Ref Range    Glucose 92 65 - 99 mg/dL    BUN 11 8 - 23 mg/dL    Creatinine 0.89 0.57 - 1.00 mg/dL    Sodium 143 136 - 145 mmol/L    Potassium 3.6 3.5 - 5.2 mmol/L    Chloride 101 98 - 107 mmol/L    CO2 31.0 (H) 22.0 - 29.0 mmol/L    Calcium 9.3 8.6 - 10.5 mg/dL    Total Protein 6.6 6.0 - 8.5 g/dL    Albumin 4.10 3.50 - 5.20 g/dL    ALT (SGPT) 15 1 - 33 U/L    AST (SGOT) 22 1 - 32 U/L    Alkaline Phosphatase 91 39 - 117 U/L    Total Bilirubin 1.1 0.0 - 1.2 mg/dL    Globulin 2.5 gm/dL    A/G Ratio 1.6 g/dL    BUN/Creatinine Ratio 12.4 7.0 - 25.0    Anion Gap 11.0 5.0 - 15.0 mmol/L    eGFR 64.4 >60.0 mL/min/1.73   Troponin    Collection Time: 12/14/22  9:55 AM    Specimen: Blood   Result Value Ref Range    Troponin T <0.010 0.000 - 0.030 ng/mL   Magnesium    Collection Time: 12/14/22  9:55 AM    Specimen: Blood    Result Value Ref Range    Magnesium 2.0 1.6 - 2.4 mg/dL   CBC Auto Differential    Collection Time: 12/14/22  9:55 AM    Specimen: Blood   Result Value Ref Range    WBC 6.14 3.40 - 10.80 10*3/mm3    RBC 4.50 3.77 - 5.28 10*6/mm3    Hemoglobin 13.7 12.0 - 15.9 g/dL    Hematocrit 41.0 34.0 - 46.6 %    MCV 91.1 79.0 - 97.0 fL    MCH 30.4 26.6 - 33.0 pg    MCHC 33.4 31.5 - 35.7 g/dL    RDW 12.9 12.3 - 15.4 %    RDW-SD 44.7 37.0 - 54.0 fl    MPV 11.4 6.0 - 12.0 fL    Platelets 136 (L) 140 - 450 10*3/mm3    Neutrophil % 66.4 42.7 - 76.0 %    Lymphocyte % 19.7 19.6 - 45.3 %    Monocyte % 10.1 5.0 - 12.0 %    Eosinophil % 2.1 0.3 - 6.2 %    Basophil % 1.0 0.0 - 1.5 %    Neutrophils, Absolute 4.08 1.70 - 7.00 10*3/mm3    Lymphocytes, Absolute 1.21 0.70 - 3.10 10*3/mm3    Monocytes, Absolute 0.62 0.10 - 0.90 10*3/mm3    Eosinophils, Absolute 0.13 0.00 - 0.40 10*3/mm3    Basophils, Absolute 0.06 0.00 - 0.20 10*3/mm3   CK    Collection Time: 12/14/22  9:55 AM    Specimen: Blood   Result Value Ref Range    Creatine Kinase 46 20 - 180 U/L   ECG 12 Lead Dyspnea    Collection Time: 12/14/22 10:09 AM   Result Value Ref Range    QT Interval 399 ms   Duplex Venous Lower Extremity - Right    Collection Time: 12/14/22 11:05 AM   Result Value Ref Range    Target HR (85%) 116 bpm    Max. Pred. HR (100%) 137 bpm    Right Common Femoral Spont Y     Right Common Femoral Competent Y     Right Common Femoral Phasic Y     Right Common Femoral Compress C     Right Common Femoral Augment Y     Right Saphenofemoral Junction Compress C     Right Profunda Femoral Compress C     Right Proximal Femoral Compress C     Right Mid Femoral Spont Y     Right Mid Femoral Competent Y     Right Mid Femoral Phasic Y     Right Mid Femoral Compress C     Right Mid Femoral Augment Y     Right Distal Femoral Compress C     Right Popliteal Spont Y     Right Popliteal Competent Y     Right Popliteal Phasic Y     Right Popliteal Compress C     Right Popliteal  Augment Y     Right Posterior Tibial Compress C     Right Peroneal Compress C     Right Gastronemius Compress C     Right Greater Saph AK Compress C     Right Greater Saph BK Compress C     Right Lesser Saph Compress C     Left Common Femoral Spont Y     Left Common Femoral Competent Y     Left Common Femoral Phasic Y     Left Common Femoral Compress C     Left Common Femoral Augment Y     BH CV VAS PRELIMINARY FINDINGS SCRIPTING 1.0        Ordered the above labs and reviewed the results.        RADIOLOGY  XR Femur 2 View Right    Result Date: 12/14/2022  XR FEMUR 2 VW RIGHT-  INDICATIONS: Pain  TECHNIQUE: 4 views of the right femur  COMPARISON: None available  FINDINGS:  No acute fracture, erosion, or dislocation is identified. Right hip arthroplasty hardware appears intact. Degenerative changes are seen at the knee. Arterial calcification is present. Follow-up/further evaluation can be obtained as indications persist.       As described.  This report was finalized on 12/14/2022 12:07 PM by Dr. Thanh Finley M.D.      CT Angiogram Chest Pulmonary Embolism    Result Date: 12/14/2022  CT ANGIOGRAM OF THE CHEST. MULTIPLE CORONAL, SAGITTAL, AND 3-D RECONSTRUCTIONS.  HISTORY: 82-year-old female with DVT and shortness of breath. Patient is on Coumadin for A. fib.  TECHNIQUE: Radiation dose reduction techniques were utilized, including automated exposure control and exposure modulation based on body size. CT angiogram of the chest was performed following the administration of IV contrast. Multiple coronal, sagittal, and 3-D reconstruction images were obtained. Compared with noncontrasted chest CT 08/26/2021.  FINDINGS: There is no convincing evidence for acute pulmonary thromboemboli. There are stable chronic faint reticulonodular opacities in both upper and lower lobes. There is a stable sub-6 mm right middle lobe pulmonary nodule. No definite new opacities are seen and there are no pleural or pericardial effusions.  There is no lymphadenopathy within the chest. No new abnormality seen at the visualized upper abdomen.       1. There is no convincing evidence for pulmonary thromboemboli. 2. Stable chronic faint reticulonodular opacities in both lungs which likely represents sequela of infectious infiltrates in the past. No acute abnormality is seen.      Duplex Venous Lower Extremity - Right    Result Date: 12/14/2022  •  Normal right lower extremity venous duplex scan.       Ordered the above noted radiological studies. Reviewed by me in PACS.          PROCEDURES  Procedures        MEDICATIONS GIVEN IN ER  Medications   iopamidol (ISOVUE-370) 76 % injection 100 mL (95 mL Intravenous Given by Other 12/14/22 1211)           MEDICAL DECISION MAKING, PROGRESS, and CONSULTS    All labs have been independently reviewed by me.  All radiology studies have been reviewed by me and discussed with radiologist dictating the report.   EKG's independently viewed and interpreted by me.  Discussion below represents my analysis of pertinent findings related to patient's condition, differential diagnosis, treatment plan and final disposition.      Additional sources:  - Discussed/ obtained information from independent historians: Patient's daughter provides additional history given patient history of dementia/Alzheimer's.    - External (non-ED) record review: Reviewed records from [facility] including ultrasound report from 12/13/2022.     Additionally reviewed cardiology visit with Dr. Lizama on 8/17/2022.  Chart shows patient has  been on previous anticoagulation but had developed significant GI bleeds and was taken off.   She has been restarted on Coumadin due to her atrial fibrillation becoming permanent and has  tolerated well to time of visit.      Orders placed during this visit:  Orders Placed This Encounter   Procedures   • CT Angiogram Chest Pulmonary Embolism   • XR Femur 2 View Right   • aPTT   • Protime-INR   •  Comprehensive Metabolic Panel   • Troponin   • Magnesium   • CBC Auto Differential   • CK   • ECG 12 Lead Dyspnea   • CBC & Differential         Additional orders considered but not ordered:  None        Differential diagnosis:    Patient has known DVT.differential diagnosis: PE, ineffective anticoagulation, medication noncompliance      Independent interpretation of labs, radiology studies, and discussions with consultants:  ED Course as of 12/14/22 1718   Wed Dec 14, 2022   0935 Reviewed records sent from [facility] or patient is living.  Ultrasound of the right lower extremity on 12/13/2022 shows an acute right lower extremity DVT involving the right common through distal superficial femoral veins.  Additionally shows an acute right lower extremity superficial venous thrombosis involving the right greater saphenous vein near the saphenofemoral junction.  Most recent PT/INRs show patient was subtherapeutic on 11/28 at 1.4, 12/2 at 1.3, 12/8 at 1.7.  PT/INR from 12/12/2022 was 2.5. [DC]   1017 EKG shows rate controlled atrial fibrillation [DC]   1019 Discussed case with Dr. Feliz, attending physician, who like to repeat ultrasound of right lower extremity. [DC]   1046 EKG reading  EKG was done at 1009  Its rate of 84 it is atrial fibrillation with narrow complex and normal axis I do not appreciate any acute injury pattern but there is diffuse T wave changes in multiple leads  QT looks normal  Compared to the previous EKG and it looks fairly similar.  The previous EKG was done on October 23, 2020. [MM]   1100 INR(!): 2.19 [DC]   1110 Ultrasound  called stating preliminary result of right lower extremity ultrasound was negative for DVT. [DC]   1112 Troponin T: <0.010 [DC]   1136 Will obtain xray of femur due to leg pain with no DVT noted on repeat ultrasound. [DC]   1138 Case management states inability to walk would not prevent patient from being discharged back to nursing home. [DC]   1353 Discussed at  length patient's imaging and lab results.  Plan to discharge back to nursing home.  Will recommend PT eval and close PCP follow-up for further treatment as needed.  ER return precautions were discussed. [DC]      ED Course User Index  [DC] Galina Coleman PA  [MM] Montana Feliz MD              Patient was placed in face mask in first look. Patient was wearing facemask when I entered the room and throughout our encounter. I wore full protective equipment throughout this patient encounter including a face mask, and gloves. Hand hygiene was performed before donning protective equipment and after removal when leaving the room.      DIAGNOSIS  Final diagnoses:   Musculoskeletal pain of right lower extremity         DISPOSITION  Discharge            Latest Documented Vital Signs:  As of 17:18 EST  BP- 149/90 HR- 79 Temp- 96.9 °F (36.1 °C) (Tympanic) O2 sat- 96%        --    Please note that portions of this were completed with a voice recognition program.       Note Disclaimer: At [facility], we believe that sharing information builds trust and better relationships. You are receiving this note because you are receiving care at [facility] or recently visited. It is possible you will see health information before a provider has talked with you about it. This kind of information can be easy to misunderstand. To help you fully understand what it means for your health, we urge you to discuss this note with your provider.           Galina Coleman PA  12/14/22 5400

## 2022-12-14 NOTE — PROGRESS NOTES
Clinical Pharmacy Services: Medication History    Margaret Gu is a 82 y.o. female presenting to [facility] for   Chief Complaint   Patient presents with   • Leg Pain       She  has a past medical history of Adrenal insufficiency ([facility]), Anxiety, Arthritis, Asthma, Atrial fibrillation (HCC), CHF (congestive heart failure) ([facility]), Colon tumor, COPD (chronic obstructive pulmonary disease) ([facility]), Dementia ([facility]), Depression, Depression, Difficulty walking, GERD (gastroesophageal reflux disease), Goiter, Headache, tension-type, Hypertension, Hypocalcemia, Hypokalemia, Memory loss, Migraine, Mild cognitive impairment, Shortness of breath, Sleep apnea, and Valvular heart disease.    Allergies as of 12/14/2022 - Reviewed 12/14/2022   Allergen Reaction Noted   • Chlorhexidine Rash and Unknown - Low Severity 03/05/2015   • Dilaudid [hydromorphone hcl] Anaphylaxis 07/25/2017   • Ace inhibitors Cough 04/30/2019   • Bactrim [sulfamethoxazole-trimethoprim] Hives and Itching    • Chloraprep one step [chlorhexidine gluconate] Rash 07/25/2017   • Ciprofloxacin Rash 04/30/2019   • Codeine Unknown (See Comments)    • Keflex [cephalexin] Unknown (See Comments) 04/30/2019   • Latex Rash 10/31/2017   • Penicillins Rash 04/30/2019       Medication information was obtained from: [facility]  Pharmacy and Phone Number:     Prior to Admission Medications     Prescriptions Last Dose Informant Patient Reported? Taking?    acetaminophen (TYLENOL) 325 MG tablet 12/13/2022 Nursing Home Yes Yes    Take 650 mg by mouth Every 6 (Six) Hours As Needed for Mild Pain.    Cholecalciferol 50 MCG (2000 UT) tablet 12/13/2022 Nursing Home Yes Yes    Take 50 mcg by mouth Every Morning.    divalproex (DEPAKOTE) 125 MG DR tablet 12/13/2022 Nursing Home Yes Yes    Take 125 mg by mouth 2 (Two) Times a Day.    furosemide (LASIX) 20 MG tablet 12/13/2022 Nursing Home No Yes    Take 1 tablet by mouth Daily.    Patient taking  differently:  Take 20 mg by mouth Every Morning.    melatonin 5 MG tablet tablet 12/13/2022 Nursing Home Yes Yes    Take 5 mg by mouth Every Night.    memantine (NAMENDA) 10 MG tablet 12/13/2022 Nursing Home No Yes    Take 1 tablet by mouth 2 (Two) Times a Day.    metoprolol succinate XL (TOPROL-XL) 100 MG 24 hr tablet 12/13/2022 Nursing Home No Yes    Take 1 tablet by mouth Daily.    Patient taking differently:  Take 100 mg by mouth Every Morning.    montelukast (SINGULAIR) 10 MG tablet 12/13/2022 Nursing Home Yes Yes    Take 10 mg by mouth Every Night.    potassium chloride (KLOR-CON) 10 MEQ CR tablet 12/13/2022 Nursing Home No Yes    Take 1 tablet by mouth Daily.    Patient taking differently:  Take 10 mEq by mouth Every Morning.    predniSONE (DELTASONE) 5 MG tablet 12/13/2022 Nursing Home No Yes    TAKE 1 TABLET EVERY DAY    Patient taking differently:  Take 5 mg by mouth Every Morning.    traZODone (DESYREL) 100 MG tablet 12/13/2022 Nursing Home No Yes    Take 1 tablet by mouth Every Night.    traZODone (DESYREL) 50 MG tablet 12/10/2022 Nursing Home Yes Yes    Take 25 mg by mouth Every 6 (Six) Hours As Needed (Anxiety).    warfarin (COUMADIN) 5 MG tablet 12/13/2022 Nursing Home No Yes    Take 1 tablet by mouth Every Night.    Menthol, Topical Analgesic, (Biofreeze) 4 % gel  Nursing Home Yes No    Apply 1 application topically to the appropriate area as directed Every 6 (Six) Hours As Needed.            Medication notes:     This medication list is complete to the best of my knowledge as of 12/14/2022    Please call if questions.    Jonny Green  Medication History Technician   [phone]    12/14/2022 12:37 EST

## 2022-12-14 NOTE — ED TRIAGE NOTES
Pt to ED from [facility] with daughter. Pt c/o right lower leg pain. nursing home performed doppler and found DVT in RLE. Pt currently on blood thinners

## 2022-12-14 NOTE — DISCHARGE INSTRUCTIONS
Please use tylenol and ice packs to right groin area for suspected muscle strain. PCP should evaluate and determine if patient would benefit from medication such as gabapentin for possible neuropathy. PT evaluation and treatment would also be beneficial. Please return to ER for new or worsening symptoms.

## 2023-03-16 RX ORDER — MEMANTINE HYDROCHLORIDE 10 MG/1
TABLET ORAL
Qty: 180 TABLET | Refills: 3 | OUTPATIENT
Start: 2023-03-16

## 2023-04-22 ENCOUNTER — HOSPITAL ENCOUNTER (OUTPATIENT)
Facility: HOSPITAL | Age: 83
Setting detail: OBSERVATION
Discharge: NURSING, REHAB OR LONG-TERM CARE | End: 2023-04-24
Attending: EMERGENCY MEDICINE | Admitting: STUDENT IN AN ORGANIZED HEALTH CARE EDUCATION/TRAINING PROGRAM
Payer: MEDICARE

## 2023-04-22 ENCOUNTER — APPOINTMENT (OUTPATIENT)
Dept: CT IMAGING | Facility: HOSPITAL | Age: 83
End: 2023-04-22
Payer: MEDICARE

## 2023-04-22 ENCOUNTER — APPOINTMENT (OUTPATIENT)
Dept: GENERAL RADIOLOGY | Facility: HOSPITAL | Age: 83
End: 2023-04-22
Payer: MEDICARE

## 2023-04-22 DIAGNOSIS — G30.9 ALZHEIMER'S DEMENTIA WITH AGITATION, UNSPECIFIED DEMENTIA SEVERITY, UNSPECIFIED TIMING OF DEMENTIA ONSET: ICD-10-CM

## 2023-04-22 DIAGNOSIS — Z79.01 CHRONIC ANTICOAGULATION: ICD-10-CM

## 2023-04-22 DIAGNOSIS — S30.0XXA TRAUMATIC HEMATOMA OF BUTTOCK, INITIAL ENCOUNTER: Primary | ICD-10-CM

## 2023-04-22 DIAGNOSIS — F02.811 ALZHEIMER'S DEMENTIA WITH AGITATION, UNSPECIFIED DEMENTIA SEVERITY, UNSPECIFIED TIMING OF DEMENTIA ONSET: ICD-10-CM

## 2023-04-22 LAB
ABO GROUP BLD: NORMAL
ALBUMIN SERPL-MCNC: 3.8 G/DL (ref 3.5–5.2)
ALBUMIN/GLOB SERPL: 1.3 G/DL
ALP SERPL-CCNC: 117 U/L (ref 39–117)
ALT SERPL W P-5'-P-CCNC: 11 U/L (ref 1–33)
ANION GAP SERPL CALCULATED.3IONS-SCNC: 9 MMOL/L (ref 5–15)
AST SERPL-CCNC: 20 U/L (ref 1–32)
BASOPHILS # BLD AUTO: 0.05 10*3/MM3 (ref 0–0.2)
BASOPHILS NFR BLD AUTO: 0.6 % (ref 0–1.5)
BILIRUB SERPL-MCNC: 0.6 MG/DL (ref 0–1.2)
BLD GP AB SCN SERPL QL: NEGATIVE
BUN SERPL-MCNC: 20 MG/DL (ref 8–23)
BUN/CREAT SERPL: 29 (ref 7–25)
CALCIUM SPEC-SCNC: 8.9 MG/DL (ref 8.6–10.5)
CHLORIDE SERPL-SCNC: 101 MMOL/L (ref 98–107)
CO2 SERPL-SCNC: 32 MMOL/L (ref 22–29)
CREAT SERPL-MCNC: 0.69 MG/DL (ref 0.57–1)
DEPRECATED RDW RBC AUTO: 43.4 FL (ref 37–54)
EGFRCR SERPLBLD CKD-EPI 2021: 86.2 ML/MIN/1.73
EOSINOPHIL # BLD AUTO: 0.15 10*3/MM3 (ref 0–0.4)
EOSINOPHIL NFR BLD AUTO: 1.7 % (ref 0.3–6.2)
ERYTHROCYTE [DISTWIDTH] IN BLOOD BY AUTOMATED COUNT: 12.3 % (ref 12.3–15.4)
GLOBULIN UR ELPH-MCNC: 2.9 GM/DL
GLUCOSE SERPL-MCNC: 104 MG/DL (ref 65–99)
HCT VFR BLD AUTO: 35.7 % (ref 34–46.6)
HGB BLD-MCNC: 11.6 G/DL (ref 12–15.9)
IMM GRANULOCYTES # BLD AUTO: 0.03 10*3/MM3 (ref 0–0.05)
IMM GRANULOCYTES NFR BLD AUTO: 0.3 % (ref 0–0.5)
INR PPP: 2.35 (ref 0.9–1.1)
LYMPHOCYTES # BLD AUTO: 1.43 10*3/MM3 (ref 0.7–3.1)
LYMPHOCYTES NFR BLD AUTO: 16.6 % (ref 19.6–45.3)
MCH RBC QN AUTO: 30.8 PG (ref 26.6–33)
MCHC RBC AUTO-ENTMCNC: 32.5 G/DL (ref 31.5–35.7)
MCV RBC AUTO: 94.7 FL (ref 79–97)
MONOCYTES # BLD AUTO: 0.67 10*3/MM3 (ref 0.1–0.9)
MONOCYTES NFR BLD AUTO: 7.8 % (ref 5–12)
NEUTROPHILS NFR BLD AUTO: 6.28 10*3/MM3 (ref 1.7–7)
NEUTROPHILS NFR BLD AUTO: 73 % (ref 42.7–76)
NRBC BLD AUTO-RTO: 0 /100 WBC (ref 0–0.2)
PLATELET # BLD AUTO: 155 10*3/MM3 (ref 140–450)
PMV BLD AUTO: 11.7 FL (ref 6–12)
POTASSIUM SERPL-SCNC: 3.6 MMOL/L (ref 3.5–5.2)
PROT SERPL-MCNC: 6.7 G/DL (ref 6–8.5)
PROTHROMBIN TIME: 26.1 SECONDS (ref 11.7–14.2)
RBC # BLD AUTO: 3.77 10*6/MM3 (ref 3.77–5.28)
RH BLD: NEGATIVE
SODIUM SERPL-SCNC: 142 MMOL/L (ref 136–145)
T&S EXPIRATION DATE: NORMAL
WBC NRBC COR # BLD: 8.61 10*3/MM3 (ref 3.4–10.8)

## 2023-04-22 PROCEDURE — G0378 HOSPITAL OBSERVATION PER HR: HCPCS

## 2023-04-22 PROCEDURE — 86900 BLOOD TYPING SEROLOGIC ABO: CPT | Performed by: EMERGENCY MEDICINE

## 2023-04-22 PROCEDURE — 72125 CT NECK SPINE W/O DYE: CPT

## 2023-04-22 PROCEDURE — 80053 COMPREHEN METABOLIC PANEL: CPT | Performed by: EMERGENCY MEDICINE

## 2023-04-22 PROCEDURE — 99284 EMERGENCY DEPT VISIT MOD MDM: CPT

## 2023-04-22 PROCEDURE — 86850 RBC ANTIBODY SCREEN: CPT | Performed by: EMERGENCY MEDICINE

## 2023-04-22 PROCEDURE — 85025 COMPLETE CBC W/AUTO DIFF WBC: CPT | Performed by: EMERGENCY MEDICINE

## 2023-04-22 PROCEDURE — 70450 CT HEAD/BRAIN W/O DYE: CPT

## 2023-04-22 PROCEDURE — 85610 PROTHROMBIN TIME: CPT | Performed by: EMERGENCY MEDICINE

## 2023-04-22 PROCEDURE — 86901 BLOOD TYPING SEROLOGIC RH(D): CPT | Performed by: EMERGENCY MEDICINE

## 2023-04-22 PROCEDURE — 73502 X-RAY EXAM HIP UNI 2-3 VIEWS: CPT

## 2023-04-22 PROCEDURE — 72192 CT PELVIS W/O DYE: CPT

## 2023-04-22 RX ORDER — ACETAMINOPHEN 325 MG/1
650 TABLET ORAL EVERY 4 HOURS PRN
Status: DISCONTINUED | OUTPATIENT
Start: 2023-04-22 | End: 2023-04-24 | Stop reason: HOSPADM

## 2023-04-22 RX ORDER — ONDANSETRON 2 MG/ML
4 INJECTION INTRAMUSCULAR; INTRAVENOUS EVERY 6 HOURS PRN
Status: DISCONTINUED | OUTPATIENT
Start: 2023-04-22 | End: 2023-04-24 | Stop reason: HOSPADM

## 2023-04-22 RX ORDER — ONDANSETRON 4 MG/1
4 TABLET, FILM COATED ORAL EVERY 6 HOURS PRN
Status: DISCONTINUED | OUTPATIENT
Start: 2023-04-22 | End: 2023-04-24 | Stop reason: HOSPADM

## 2023-04-22 RX ORDER — OLANZAPINE 10 MG/1
10 TABLET, ORALLY DISINTEGRATING ORAL ONCE
Status: COMPLETED | OUTPATIENT
Start: 2023-04-22 | End: 2023-04-22

## 2023-04-22 RX ORDER — UREA 10 %
3 LOTION (ML) TOPICAL NIGHTLY PRN
Status: DISCONTINUED | OUTPATIENT
Start: 2023-04-22 | End: 2023-04-24 | Stop reason: HOSPADM

## 2023-04-22 RX ORDER — ACETAMINOPHEN 500 MG
1000 TABLET ORAL ONCE
Status: COMPLETED | OUTPATIENT
Start: 2023-04-22 | End: 2023-04-22

## 2023-04-22 RX ORDER — SODIUM CHLORIDE 0.9 % (FLUSH) 0.9 %
10 SYRINGE (ML) INJECTION AS NEEDED
Status: DISCONTINUED | OUTPATIENT
Start: 2023-04-22 | End: 2023-04-24 | Stop reason: HOSPADM

## 2023-04-22 RX ADMIN — ACETAMINOPHEN 1000 MG: 500 TABLET ORAL at 17:15

## 2023-04-22 RX ADMIN — OLANZAPINE 10 MG: 10 TABLET, ORALLY DISINTEGRATING ORAL at 17:15

## 2023-04-22 NOTE — H&P
HISTORY AND PHYSICAL   [facility]        Date of Admission: 2023  Patient Identification:  Name: Margaret Gu  Age: 82 y.o.  Sex: female  :  1940  MRN: [id number]                     Primary Care Physician: Santhosh Rbuio MD    Chief Complaint:  82 year old female who presented to the emergency room after a fall at her nursing home; she has a history of dementia and does not recall the event; she denies any pain presently; a daughter is present to help with the history    History of Present Illness:   As above    Past Medical History:  Past Medical History:   Diagnosis Date   • Adrenal insufficiency    • Anxiety    • Arthritis    • Asthma    • Atrial fibrillation    • CHF (congestive heart failure)    • Colon tumor    • COPD (chronic obstructive pulmonary disease)    • Dementia    • Depression    • Depression    • Difficulty walking    • GERD (gastroesophageal reflux disease)    • Goiter    • Headache, tension-type    • Hypertension    • Hypocalcemia    • Hypokalemia    • Memory loss    • Migraine    • Mild cognitive impairment    • Shortness of breath    • Sleep apnea    • Valvular heart disease      Past Surgical History:  Past Surgical History:   Procedure Laterality Date   • APPENDECTOMY     • BACK SURGERY      6 back surgeries   • BREAST SURGERY     • CARDIAC ABLATION     • CARDIAC ABLATION     • CATARACT EXTRACTION, BILATERAL Bilateral    • CHOLECYSTECTOMY     • HYSTERECTOMY     • JOINT REPLACEMENT Bilateral     shoulder   • MITRAL VALVE REPAIR/REPLACEMENT     • OOPHORECTOMY     • TONSILLECTOMY AND ADENOIDECTOMY     • TRICUSPID VALVE SURGERY      Repair      Home Meds:  (Not in a hospital admission)      Allergies:  Allergies   Allergen Reactions   • Chlorhexidine Rash and Unknown - Low Severity     Very red skin INCLUDING CHORAPREP!!. Patient states she had no problem with CHG solution or CHG wipes  for surgery.  Red and hot   • Dilaudid [Hydromorphone  "Hcl] Anaphylaxis   • Ace Inhibitors Cough   • Bactrim [Sulfamethoxazole-Trimethoprim] Hives and Itching   • Chloraprep One Step [Chlorhexidine Gluconate] Rash     Red and hot   • Ciprofloxacin Rash   • Codeine Unknown (See Comments)     Unknown     • Keflex [Cephalexin] Unknown (See Comments)     Unknown; pt tolerated ceftriaxone on 10/22/2020     • Latex Rash   • Penicillins Rash     Rash required tx with \"pill\" doctor prescribed but no ED visit; pt tolerated ceftriaxone on 10/22/2020       Immunizations:  Immunization History   Administered Date(s) Administered   • COVID-19 (PFIZER) BIVALENT BOOSTER 12+YRS 2022   • COVID-19 (PFIZER) PURPLE CAP 2021, 2021, 2021   • Fluad Quad 65+ 2022   • Fluzone High Dose =>65 Years ([employer] ONLY) 10/23/2017, 10/16/2018, 2019, 10/05/2020   • Fluzone High-Dose 65+yrs 10/28/2021   • Influenza, Unspecified 10/23/2017   • Pneumococcal Polysaccharide (PPSV23) 2004     Social History:   Social History     Social History Narrative   • Not on file     Social History     Socioeconomic History   • Marital status:    Tobacco Use   • Smoking status: Never   • Smokeless tobacco: Never   Vaping Use   • Vaping Use: Never used   Substance and Sexual Activity   • Alcohol use: Not Currently   • Drug use: Never   • Sexual activity: Defer       Family History:  Family History   Problem Relation Age of Onset   • Dementia Mother    • Cancer Father         Review of Systems  Not obtainable from the patient  Objective:  T Max 24 hrs: Temp (24hrs), Av.7 °F (36.5 °C), Min:97.7 °F (36.5 °C), Max:97.7 °F (36.5 °C)    Vitals Ranges:   Temp:  [97.7 °F (36.5 °C)] 97.7 °F (36.5 °C)  Heart Rate:  [61-94] 94  Resp:  [16-18] 18  BP: (130-151)/(67-90) 144/67      Exam:  /67   Pulse 94   Temp 97.7 °F (36.5 °C)   Resp 18   Ht 162.6 cm (64\")   Wt 74.8 kg (165 lb)   SpO2 90%   BMI 28.32 kg/m²     General Appearance:    Alert, cooperative, no distress, " appears stated age   Head:    Normocephalic, without obvious abnormality, atraumatic   Eyes:    PERRL, conjunctivae/corneas clear, EOM's intact, both eyes   Ears:    Normal external ear canals, both ears   Nose:   Nares normal, septum midline, mucosa normal, no drainage    or sinus tenderness   Throat:   Lips, mucosa, and tongue normal   Neck:   Supple, symmetrical, trachea midline, no adenopathy;     thyroid:  no enlargement/tenderness/nodules; no carotid    bruit or JVD   Back:     Symmetric, no curvature, ROM normal, no CVA tenderness   Lungs:     Clear to auscultation bilaterally, respirations unlabored   Chest Wall:    No tenderness or deformity    Heart:    Regular rate and rhythm, S1 and S2 normal, no murmur, rub   or gallop   Abdomen:     Soft, nontender, bowel sounds active all four quadrants,     no masses, no hepatomegaly, no splenomegaly   Extremities:   Extremities normal, atraumatic, no cyanosis or edema   Pulses:   2+ and symmetric all extremities   Skin:   Skin color, texture, turgor normal, no rashes or lesions               .    Data Review:  Labs in chart were reviewed.  WBC   Date Value Ref Range Status   04/22/2023 8.61 3.40 - 10.80 10*3/mm3 Final     Hemoglobin   Date Value Ref Range Status   04/22/2023 11.6 (L) 12.0 - 15.9 g/dL Final     Hematocrit   Date Value Ref Range Status   04/22/2023 35.7 34.0 - 46.6 % Final     Platelets   Date Value Ref Range Status   04/22/2023 155 140 - 450 10*3/mm3 Final     Sodium   Date Value Ref Range Status   04/22/2023 142 136 - 145 mmol/L Final     Potassium   Date Value Ref Range Status   04/22/2023 3.6 3.5 - 5.2 mmol/L Final     Comment:     Slight hemolysis detected by analyzer. Results may be affected.     Chloride   Date Value Ref Range Status   04/22/2023 101 98 - 107 mmol/L Final     CO2   Date Value Ref Range Status   04/22/2023 32.0 (H) 22.0 - 29.0 mmol/L Final     BUN   Date Value Ref Range Status   04/22/2023 20 8 - 23 mg/dL Final     Creatinine    Date Value Ref Range Status   04/22/2023 0.69 0.57 - 1.00 mg/dL Final     Glucose   Date Value Ref Range Status   04/22/2023 104 (H) 65 - 99 mg/dL Final     Calcium   Date Value Ref Range Status   04/22/2023 8.9 8.6 - 10.5 mg/dL Final     AST (SGOT)   Date Value Ref Range Status   04/22/2023 20 1 - 32 U/L Final     ALT (SGPT)   Date Value Ref Range Status   04/22/2023 11 1 - 33 U/L Final     Alkaline Phosphatase   Date Value Ref Range Status   04/22/2023 117 39 - 117 U/L Final                Imaging Results (All)     Procedure Component Value Units Date/Time    CT Pelvis Without Contrast [[id number]] Collected: 04/22/23 1611     Updated: 04/22/23 1611    Narrative:      CT PELVIS WITHOUT IV CONTRAST     HISTORY: 82-year-old female status post fall. Pelvic pain.     TECHNIQUE: Radiation dose reduction techniques were utilized, including  automated exposure control and exposure modulation based on body size.   3 mm images were obtained through the pelvis without the administration  of IV contrast. Compared with today's radiographs of the left hip.     FINDINGS: There is no convincing evidence for an acute fracture of the  left hip or the pelvic bones. There is a large hematoma at the  subcutaneous tissues of the left buttock measuring approximately 9 x 7.5  cm and the craniocaudad span of the hematoma is 7.6 cm. There is also  ill-defined fluid and much smaller hematomas throughout the surrounding  subcutaneous tissues of the left buttock and uppermost thigh.             CT Head Without Contrast [[id number]] Collected: 04/22/23 1526     Updated: 04/22/23 1544    Narrative:      CT HEAD WO CONTRAST-, CT CERVICAL SPINE WO CONTRAST-     INDICATIONS: Trauma     TECHNIQUE: Radiation dose reduction techniques were utilized, including  automated exposure control and exposure modulation based on body size.  Noncontrast head CT, cervical spine CT     COMPARISON: None available     FINDINGS:     Head CT:     No acute  intracranial hemorrhage, midline shift or mass effect. No acute  territorial infarct is identified. Small old lacunar infarct is apparent  in right basal ganglia. Bilateral basal ganglia mineralization is  present.     Mild periventricular hypodensities suggest chronic small vessel ischemic  change in a patient this age.     Arterial calcifications are seen at the base of the brain.     Ventricles, cisterns, cerebral sulci are unremarkable for patient age.     The visualized paranasal sinuses, orbits, mastoid air cells are  unremarkable.     Degenerative changes are seen at the temporomandibular joints.        Cervical spine CT:     No acute fracture is identified. Alignment is in range of normal.     Facet and uncovertebral joint hypertrophy contributes to neuroforaminal  narrowing, more prominent on the right at C5/6, and on the left at C6/7.     Bilateral carotid arterial calcifications are present.             Impression:         No acute intracranial hemorrhage or hydrocephalus. No acute cervical  fracture identified; degenerative changes in the cervical spine. If  there is further clinical concern, MRI could be considered for further  evaluation.     This report was finalized on 4/22/2023 3:41 PM by Dr. Thnah Finley M.D.       CT Cervical Spine Without Contrast [[id number]] Collected: 04/22/23 1526     Updated: 04/22/23 1544    Narrative:      CT HEAD WO CONTRAST-, CT CERVICAL SPINE WO CONTRAST-     INDICATIONS: Trauma     TECHNIQUE: Radiation dose reduction techniques were utilized, including  automated exposure control and exposure modulation based on body size.  Noncontrast head CT, cervical spine CT     COMPARISON: None available     FINDINGS:     Head CT:     No acute intracranial hemorrhage, midline shift or mass effect. No acute  territorial infarct is identified. Small old lacunar infarct is apparent  in right basal ganglia. Bilateral basal ganglia mineralization is  present.     Mild  periventricular hypodensities suggest chronic small vessel ischemic  change in a patient this age.     Arterial calcifications are seen at the base of the brain.     Ventricles, cisterns, cerebral sulci are unremarkable for patient age.     The visualized paranasal sinuses, orbits, mastoid air cells are  unremarkable.     Degenerative changes are seen at the temporomandibular joints.        Cervical spine CT:     No acute fracture is identified. Alignment is in range of normal.     Facet and uncovertebral joint hypertrophy contributes to neuroforaminal  narrowing, more prominent on the right at C5/6, and on the left at C6/7.     Bilateral carotid arterial calcifications are present.             Impression:         No acute intracranial hemorrhage or hydrocephalus. No acute cervical  fracture identified; degenerative changes in the cervical spine. If  there is further clinical concern, MRI could be considered for further  evaluation.     This report was finalized on 4/22/2023 3:41 PM by Dr. Thanh Finley M.D.       XR Hip With or Without Pelvis 2 - 3 View Left [[id number]] Collected: 04/22/23 1514     Updated: 04/22/23 1522    Narrative:      XR HIP W OR WO PELVIS 2-3 VIEW LEFT-     INDICATIONS: Trauma     TECHNIQUE: Frontal view the pelvis, 2 views of the left hip     COMPARISON: None available     FINDINGS:     No acute fracture, erosion, or dislocation is identified. Right hip  arthroplasty hardware is partly included. Left hip joint space appears  preserved. Degenerative change is seen at the symphysis pubis and partly  included lumbar spine. Follow-up/further evaluation can be obtained as  indications persist.       Impression:         As described.     This report was finalized on 4/22/2023 3:19 PM by Dr. Thanh Finley M.D.               Assessment:  Active Hospital Problems    Diagnosis  POA   • **Traumatic hematoma of buttock, initial encounter [S30.0XXA]  Yes      Resolved Hospital Problems   No  resolved problems to display.   hypertension  A fib  chf  Copd  Dementia  Fall  Adrenal insufficiency      Plan:  Hold coumadin  Monitor on telemetry  Patient is dnr per daughter who is poa  Trend labs  D.w patient and daughter as well as Ed provider    Dulce Prince MD  4/22/2023  19:01 EDT

## 2023-04-22 NOTE — ED NOTES
Report attempted to be called to MARYSE Cunningham @ this time, per Marisa after report she will look & call back w/ any questions.

## 2023-04-22 NOTE — ED TRIAGE NOTES
Pt comes from [facility] for unwitnessed fall landing on left hip and skin tear on left arm. Pt is on blood thinners. Pt denies hitting her head.

## 2023-04-22 NOTE — ED NOTES
.Nursing report ED to floor  Margaret Gu  82 y.o.  female    HPI :   Chief Complaint   Patient presents with    Fall    Hip Pain       Admitting doctor:   Dulce Prince MD    Admitting diagnosis:   The primary encounter diagnosis was Traumatic hematoma of buttock, initial encounter. Diagnoses of Chronic anticoagulation and Alzheimer's dementia with agitation, unspecified dementia severity, unspecified timing of dementia onset were also pertinent to this visit.    Code status:   Current Code Status       Date Active Code Status Order ID Comments User Context       Prior            Allergies:   Chlorhexidine, Dilaudid [hydromorphone hcl], Ace inhibitors, Bactrim [sulfamethoxazole-trimethoprim], Chloraprep one step [chlorhexidine gluconate], Ciprofloxacin, Codeine, Keflex [cephalexin], Latex, and Penicillins    Isolation:   No active isolations    Intake and Output  No intake or output data in the 24 hours ending 04/22/23 1835    Weight:       04/22/23  1231   Weight: 74.8 kg (165 lb)       Most recent vitals:   Vitals:    04/22/23 1705 04/22/23 1706 04/22/23 1729 04/22/23 1744   BP:  144/67     Pulse: 86  89 94   Resp:    18   Temp:       SpO2: 98%  99% 90%   Weight:       Height:           Active LDAs/IV Access:   Lines, Drains & Airways       Active LDAs       Name Placement date Placement time Site Days    Peripheral IV 04/22/23 1720 Right Antecubital 04/22/23  1720  Antecubital  less than 1                    Labs (abnormal labs have a star):   Labs Reviewed   PROTIME-INR - Abnormal; Notable for the following components:       Result Value    Protime 26.1 (*)     INR 2.35 (*)     All other components within normal limits   COMPREHENSIVE METABOLIC PANEL - Abnormal; Notable for the following components:    Glucose 104 (*)     CO2 32.0 (*)     BUN/Creatinine Ratio 29.0 (*)     All other components within normal limits    Narrative:     GFR Normal >60  Chronic Kidney Disease <60  Kidney Failure  <15    The GFR formula is only valid for adults with stable renal function between ages 18 and 70.   CBC WITH AUTO DIFFERENTIAL - Abnormal; Notable for the following components:    Hemoglobin 11.6 (*)     Lymphocyte % 16.6 (*)     All other components within normal limits   TYPE AND SCREEN   CBC AND DIFFERENTIAL    Narrative:     The following orders were created for panel order CBC & Differential.  Procedure                               Abnormality         Status                     ---------                               -----------         ------                     CBC Auto Differential[[id number]]        Abnormal            Final result                 Please view results for these tests on the individual orders.       EKG:   No orders to display       Meds given in ED:   Medications   sodium chloride 0.9 % flush 10 mL (has no administration in time range)   acetaminophen (TYLENOL) tablet 1,000 mg (1,000 mg Oral Given 4/22/23 1715)   OLANZapine zydis (ZyPREXA) disintegrating tablet 10 mg (10 mg Oral Given 4/22/23 1715)       Imaging results:  CT Head Without Contrast    Result Date: 4/22/2023   No acute intracranial hemorrhage or hydrocephalus. No acute cervical fracture identified; degenerative changes in the cervical spine. If there is further clinical concern, MRI could be considered for further evaluation.  This report was finalized on 4/22/2023 3:41 PM by Dr. Thanh Finley M.D.      CT Cervical Spine Without Contrast    Result Date: 4/22/2023   No acute intracranial hemorrhage or hydrocephalus. No acute cervical fracture identified; degenerative changes in the cervical spine. If there is further clinical concern, MRI could be considered for further evaluation.  This report was finalized on 4/22/2023 3:41 PM by Dr. Thanh Finley M.D.      XR Hip With or Without Pelvis 2 - 3 View Left    Result Date: 4/22/2023   As described.  This report was finalized on 4/22/2023 3:19 PM by Dr. Thanh Finley  M.D.       Ambulatory status:   - x2 assist    Social issues:   Social History     Socioeconomic History    Marital status:    Tobacco Use    Smoking status: Never    Smokeless tobacco: Never   Vaping Use    Vaping Use: Never used   Substance and Sexual Activity    Alcohol use: Not Currently    Drug use: Never    Sexual activity: Defer       NIH Stroke Scale:         Ning Turner RN  04/22/23 18:35 EDT

## 2023-04-22 NOTE — ED PROVIDER NOTES
EMERGENCY DEPARTMENT ENCOUNTER    Room Number:  17/17  Date seen:  4/22/2023  PCP: Santhosh Rubio MD  Historian: Patient, daughter      HPI:  Chief Complaint: Fall, left hip pain  A complete HPI/ROS/PMH/PSH/SH/FH are unobtainable due to: Nothing  Context: Margaret Gu is a 82 y.o. female who presents to the ED c/o left hip pain after she reportedly fell at a nursing facility today.  This was not a witnessed fall.  Patient is unsure why she fell.  She reportedly has a history of Alzheimer's dementia.  She is on warfarin according to her home medication list.  Patient is complaining of pain in the left hip.  She denies headache or neck pain.  She does not know if she hit her head.  She denies chest pain or shortness of breath.  No back pain.            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   • History of cardiac radiofrequency ablation 12/10/2020   • History of cardiac catheterization 12/10/2020   • LV dysfunction 12/10/2020   • B12 deficiency 04/09/2021   • Permanent atrial fibrillation 08/17/2022     Resolved Ambulatory Problems     Diagnosis Date Noted   • Persistent atrial fibrillation (HCC) 10/22/2020   • Sleep apnea      Past Medical History:   Diagnosis Date   • Anxiety    • Arthritis    • Asthma    • Atrial fibrillation    • CHF (congestive heart failure)    • Colon tumor    • Dementia    • Difficulty walking    • Goiter    •  Headache, tension-type    • Hypertension    • Hypocalcemia    • Hypokalemia    • Memory loss    • Migraine    • Shortness of breath          PAST SURGICAL HISTORY  Past Surgical History:   Procedure Laterality Date   • APPENDECTOMY     • BACK SURGERY      6 back surgeries   • BREAST SURGERY     • CARDIAC ABLATION     • CARDIAC ABLATION  2013   • CATARACT EXTRACTION, BILATERAL Bilateral    • CHOLECYSTECTOMY     • HYSTERECTOMY     • JOINT REPLACEMENT Bilateral     shoulder   • MITRAL VALVE REPAIR/REPLACEMENT  2011   • OOPHORECTOMY     • TONSILLECTOMY AND ADENOIDECTOMY     • TRICUSPID VALVE SURGERY  2011    Repair         FAMILY HISTORY  Family History   Problem Relation Age of Onset   • Dementia Mother    • Cancer Father          SOCIAL HISTORY  Social History     Socioeconomic History   • Marital status:    Tobacco Use   • Smoking status: Never   • Smokeless tobacco: Never   Vaping Use   • Vaping Use: Never used   Substance and Sexual Activity   • Alcohol use: Not Currently   • Drug use: Never   • Sexual activity: Defer         ALLERGIES  Chlorhexidine, Dilaudid [hydromorphone hcl], Ace inhibitors, Bactrim [sulfamethoxazole-trimethoprim], Chloraprep one step [chlorhexidine gluconate], Ciprofloxacin, Codeine, Keflex [cephalexin], Latex, and Penicillins        REVIEW OF SYSTEMS  Review of Systems   Review of all 14 systems is negative other than stated in the HPI above.      PHYSICAL EXAM  ED Triage Vitals [04/22/23 1229]   Temp Heart Rate Resp BP SpO2   97.7 °F (36.5 °C) 61 16 130/90 99 %      Temp src Heart Rate Source Patient Position BP Location FiO2 (%)   -- -- -- -- --       Physical Exam      GENERAL: Awake and alert, no acute distress  HENT: nares patent, no scalp hematoma  EYES: no scleral icterus, pupils 3 mm reactive bilaterally  CV: regular rhythm, normal rate  RESPIRATORY: normal effort  ABDOMEN: soft, nontender throughout  MUSCULOSKELETAL: no deformity, no midline cervical spine tenderness or  step-off.  There is point tenderness over the left greater trochanter as well as pain with passive ranging of the left hip.  NEURO: alert, moves all extremities, follows commands, no facial droop, speech fluent and clear.  PSYCH:  calm, cooperative  SKIN: warm, dry    Vital signs and nursing notes reviewed.          LAB RESULTS  Recent Results (from the past 24 hour(s))   Protime-INR    Collection Time: 04/22/23  5:27 PM    Specimen: Blood   Result Value Ref Range    Protime 26.1 (H) 11.7 - 14.2 Seconds    INR 2.35 (H) 0.90 - 1.10   Comprehensive Metabolic Panel    Collection Time: 04/22/23  5:27 PM    Specimen: Blood   Result Value Ref Range    Glucose 104 (H) 65 - 99 mg/dL    BUN 20 8 - 23 mg/dL    Creatinine 0.69 0.57 - 1.00 mg/dL    Sodium 142 136 - 145 mmol/L    Potassium 3.6 3.5 - 5.2 mmol/L    Chloride 101 98 - 107 mmol/L    CO2 32.0 (H) 22.0 - 29.0 mmol/L    Calcium 8.9 8.6 - 10.5 mg/dL    Total Protein 6.7 6.0 - 8.5 g/dL    Albumin 3.8 3.5 - 5.2 g/dL    ALT (SGPT) 11 1 - 33 U/L    AST (SGOT) 20 1 - 32 U/L    Alkaline Phosphatase 117 39 - 117 U/L    Total Bilirubin 0.6 0.0 - 1.2 mg/dL    Globulin 2.9 gm/dL    A/G Ratio 1.3 g/dL    BUN/Creatinine Ratio 29.0 (H) 7.0 - 25.0    Anion Gap 9.0 5.0 - 15.0 mmol/L    eGFR 86.2 >60.0 mL/min/1.73   CBC Auto Differential    Collection Time: 04/22/23  5:27 PM    Specimen: Blood   Result Value Ref Range    WBC 8.61 3.40 - 10.80 10*3/mm3    RBC 3.77 3.77 - 5.28 10*6/mm3    Hemoglobin 11.6 (L) 12.0 - 15.9 g/dL    Hematocrit 35.7 34.0 - 46.6 %    MCV 94.7 79.0 - 97.0 fL    MCH 30.8 26.6 - 33.0 pg    MCHC 32.5 31.5 - 35.7 g/dL    RDW 12.3 12.3 - 15.4 %    RDW-SD 43.4 37.0 - 54.0 fl    MPV 11.7 6.0 - 12.0 fL    Platelets 155 140 - 450 10*3/mm3    Neutrophil % 73.0 42.7 - 76.0 %    Lymphocyte % 16.6 (L) 19.6 - 45.3 %    Monocyte % 7.8 5.0 - 12.0 %    Eosinophil % 1.7 0.3 - 6.2 %    Basophil % 0.6 0.0 - 1.5 %    Immature Grans % 0.3 0.0 - 0.5 %    Neutrophils, Absolute 6.28 1.70  - 7.00 10*3/mm3    Lymphocytes, Absolute 1.43 0.70 - 3.10 10*3/mm3    Monocytes, Absolute 0.67 0.10 - 0.90 10*3/mm3    Eosinophils, Absolute 0.15 0.00 - 0.40 10*3/mm3    Basophils, Absolute 0.05 0.00 - 0.20 10*3/mm3    Immature Grans, Absolute 0.03 0.00 - 0.05 10*3/mm3    nRBC 0.0 0.0 - 0.2 /100 WBC       Ordered the above labs and reviewed the results.        RADIOLOGY  CT Head Without Contrast, CT Cervical Spine Without Contrast    Result Date: 4/22/2023  CT HEAD WO CONTRAST-, CT CERVICAL SPINE WO CONTRAST-  INDICATIONS: Trauma  TECHNIQUE: Radiation dose reduction techniques were utilized, including automated exposure control and exposure modulation based on body size. Noncontrast head CT, cervical spine CT  COMPARISON: None available  FINDINGS:  Head CT:  No acute intracranial hemorrhage, midline shift or mass effect. No acute territorial infarct is identified. Small old lacunar infarct is apparent in right basal ganglia. Bilateral basal ganglia mineralization is present.  Mild periventricular hypodensities suggest chronic small vessel ischemic change in a patient this age.  Arterial calcifications are seen at the base of the brain.  Ventricles, cisterns, cerebral sulci are unremarkable for patient age.  The visualized paranasal sinuses, orbits, mastoid air cells are unremarkable.  Degenerative changes are seen at the temporomandibular joints.   Cervical spine CT:  No acute fracture is identified. Alignment is in range of normal.  Facet and uncovertebral joint hypertrophy contributes to neuroforaminal narrowing, more prominent on the right at C5/6, and on the left at C6/7.  Bilateral carotid arterial calcifications are present.         No acute intracranial hemorrhage or hydrocephalus. No acute cervical fracture identified; degenerative changes in the cervical spine. If there is further clinical concern, MRI could be considered for further evaluation.  This report was finalized on 4/22/2023 3:41 PM by Dr. Law  ARIANA Finley M.D.      CT Pelvis Without Contrast    Result Date: 4/22/2023  CT PELVIS WITHOUT IV CONTRAST  HISTORY: 82-year-old female status post fall. Pelvic pain.  TECHNIQUE: Radiation dose reduction techniques were utilized, including automated exposure control and exposure modulation based on body size. 3 mm images were obtained through the pelvis without the administration of IV contrast. Compared with today's radiographs of the left hip.  FINDINGS: There is no convincing evidence for an acute fracture of the left hip or the pelvic bones. There is a large hematoma at the subcutaneous tissues of the left buttock measuring approximately 9 x 7.5 cm and the craniocaudad span of the hematoma is 7.6 cm. There is also ill-defined fluid and much smaller hematomas throughout the surrounding subcutaneous tissues of the left buttock and uppermost thigh.        XR Hip With or Without Pelvis 2 - 3 View Left    Result Date: 4/22/2023  XR HIP W OR WO PELVIS 2-3 VIEW LEFT-  INDICATIONS: Trauma  TECHNIQUE: Frontal view the pelvis, 2 views of the left hip  COMPARISON: None available  FINDINGS:  No acute fracture, erosion, or dislocation is identified. Right hip arthroplasty hardware is partly included. Left hip joint space appears preserved. Degenerative change is seen at the symphysis pubis and partly included lumbar spine. Follow-up/further evaluation can be obtained as indications persist.       As described.  This report was finalized on 4/22/2023 3:19 PM by Dr. Thanh Finley M.D.        Ordered the above noted radiological studies. Reviewed by me in PACS.            PROCEDURES  Procedures              MEDICATIONS GIVEN IN ER  Medications   sodium chloride 0.9 % flush 10 mL (has no administration in time range)   acetaminophen (TYLENOL) tablet 1,000 mg (1,000 mg Oral Given 4/22/23 1715)   OLANZapine zydis (ZyPREXA) disintegrating tablet 10 mg (10 mg Oral Given 4/22/23 1715)                   MEDICAL DECISION MAKING,  PROGRESS, and CONSULTS    All labs have been independently reviewed by me.  All radiology studies have been reviewed by me and I have also reviewed the radiology report.   EKG's independently viewed and interpreted by me.  Discussion below represents my analysis of pertinent findings related to patient's condition, differential diagnosis, treatment plan and final disposition.      Additional sources:  - Discussed/ obtained information from independent historians: Patient's daughter at bedside who reports that patient does have a history of Alzheimer's dementia    - External (non-ED) record review: I reviewed PCP office visit with Dr. Dye from 11/10/2022 where patient was being evaluated for memory loss.    - Chronic or social conditions impacting care: Alzheimer's dementia    - Shared decision making: Patient and daughter informed of plan for admission for observation, monitoring of hematoma and hemoglobin.      Orders placed during this visit:  Orders Placed This Encounter   Procedures   • XR Hip With or Without Pelvis 2 - 3 View Left   • CT Head Without Contrast   • CT Cervical Spine Without Contrast   • CT Pelvis Without Contrast   • Protime-INR   • Comprehensive Metabolic Panel   • CBC Auto Differential   • Ambulate patient   • Cardiac Monitoring   • [facility] (on-call MD unless specified) Details   • Type & Screen   • Insert Peripheral IV   • Initiate Observation Status   • CBC & Differential             Differential diagnosis includes but is not limited to:    Hip fracture  Pelvic fracture  Traumatic intracranial hemorrhage        Independent interpretation of labs, radiology studies, and discussions with consultants:  ED Course as of 04/22/23 1819   Sat Apr 22, 2023   1403 Plain films of the left hip and AP pelvis independently interpreted in PACS.  She is status post right total hip arthroplasty.  I do not see acute fracture involving the left hip or pubic rami. [JR]   1542 CT brain independently interpreted in  PACS and demonstrates no acute intracranial hemorrhage. [JR]   1636 Patient was able to ambulate with a walker with some pain. [JR]   1643 On the final CT pelvis report there is mention of a left buttock hematoma.  I have reviewed these images in PACS.  Upon repeat examination the patient she does have a large left buttock hematoma.  Currently the overlying skin is indurated but appears viable without evidence of skin necrosis.  I explained my concern to patient and her daughter that given that she is anticoagulated on warfarin, this hematoma could become larger and potentially cause anemia or contribute to skin necrosis of the overlying skin.  I recommended admission for further monitoring, repeat hemoglobin tomorrow.  I will obtain labs now including an INR.  Patient has history of Alzheimer's dementia and has become relatively agitated while waiting here.  I discussed with daughter plan to administer medication for agitation and she is agreeable with this plan. [JR]   1808 INR(!): 2.35 [JR]   1808 Hemoglobin(!): 11.6 [JR]   1808 WBC: 8.61 [JR]   1808 Creatinine: 0.69 [JR]   1809 INR is therapeutic at 2.35.  Given that her bleeding is mild at this time and she does not have a rapidly expanding hematoma or signs of arterial hemorrhage on examination, I have opted not to reverse her warfarin at this time. [JR]   1818 Discussed with Dr. Prince, [facility], who agrees to admit to telemetry bed. [JR]      ED Course User Index  [JR] Messi Ybarra MD                 DIAGNOSIS  Final diagnoses:   Traumatic hematoma of buttock, initial encounter   Chronic anticoagulation   Alzheimer's dementia with agitation, unspecified dementia severity, unspecified timing of dementia onset         DISPOSITION  Admit            Latest Documented Vital Signs:  As of 18:19 EDT  BP- 144/67 HR- 94 Temp- 97.7 °F (36.5 °C) O2 sat- 90%              --    Please note that portions of this were completed with a voice recognition program.        Note Disclaimer: At [facility], we believe that sharing information builds trust and better relationships. You are receiving this note because you are receiving care at [facility] or recently visited. It is possible you will see health information before a provider has talked with you about it. This kind of information can be easy to misunderstand. To help you fully understand what it means for your health, we urge you to discuss this note with your provider.           Messi Ybarra MD  04/22/23 7536

## 2023-04-23 LAB
ANION GAP SERPL CALCULATED.3IONS-SCNC: 8 MMOL/L (ref 5–15)
BACTERIA UR QL AUTO: ABNORMAL /HPF
BILIRUB UR QL STRIP: NEGATIVE
BUN SERPL-MCNC: 18 MG/DL (ref 8–23)
BUN/CREAT SERPL: 29.5 (ref 7–25)
CALCIUM SPEC-SCNC: 8.5 MG/DL (ref 8.6–10.5)
CHLORIDE SERPL-SCNC: 103 MMOL/L (ref 98–107)
CK SERPL-CCNC: 86 U/L (ref 20–180)
CLARITY UR: ABNORMAL
CO2 SERPL-SCNC: 30 MMOL/L (ref 22–29)
COLOR UR: YELLOW
CREAT SERPL-MCNC: 0.61 MG/DL (ref 0.57–1)
DEPRECATED RDW RBC AUTO: 41.2 FL (ref 37–54)
EGFRCR SERPLBLD CKD-EPI 2021: 88.8 ML/MIN/1.73
ERYTHROCYTE [DISTWIDTH] IN BLOOD BY AUTOMATED COUNT: 12.1 % (ref 12.3–15.4)
GLUCOSE SERPL-MCNC: 88 MG/DL (ref 65–99)
GLUCOSE UR STRIP-MCNC: NEGATIVE MG/DL
HCT VFR BLD AUTO: 29.9 % (ref 34–46.6)
HGB BLD-MCNC: 9.9 G/DL (ref 12–15.9)
HGB UR QL STRIP.AUTO: ABNORMAL
HYALINE CASTS UR QL AUTO: ABNORMAL /LPF
INR PPP: 2.27 (ref 0.9–1.1)
KETONES UR QL STRIP: NEGATIVE
LEUKOCYTE ESTERASE UR QL STRIP.AUTO: ABNORMAL
MCH RBC QN AUTO: 30.8 PG (ref 26.6–33)
MCHC RBC AUTO-ENTMCNC: 33.1 G/DL (ref 31.5–35.7)
MCV RBC AUTO: 93.1 FL (ref 79–97)
NITRITE UR QL STRIP: NEGATIVE
PH UR STRIP.AUTO: >=9 [PH] (ref 5–8)
PLATELET # BLD AUTO: 147 10*3/MM3 (ref 140–450)
PMV BLD AUTO: 11.6 FL (ref 6–12)
POTASSIUM SERPL-SCNC: 3.5 MMOL/L (ref 3.5–5.2)
PROT UR QL STRIP: ABNORMAL
PROTHROMBIN TIME: 25.4 SECONDS (ref 11.7–14.2)
RBC # BLD AUTO: 3.21 10*6/MM3 (ref 3.77–5.28)
RBC # UR STRIP: ABNORMAL /HPF
REF LAB TEST METHOD: ABNORMAL
SODIUM SERPL-SCNC: 141 MMOL/L (ref 136–145)
SP GR UR STRIP: 1.02 (ref 1–1.03)
SQUAMOUS #/AREA URNS HPF: ABNORMAL /HPF
TRI-PHOS CRY URNS QL MICRO: ABNORMAL /HPF
UROBILINOGEN UR QL STRIP: ABNORMAL
WBC # UR STRIP: ABNORMAL /HPF
WBC NRBC COR # BLD: 6.3 10*3/MM3 (ref 3.4–10.8)

## 2023-04-23 PROCEDURE — 81001 URINALYSIS AUTO W/SCOPE: CPT | Performed by: STUDENT IN AN ORGANIZED HEALTH CARE EDUCATION/TRAINING PROGRAM

## 2023-04-23 PROCEDURE — G0378 HOSPITAL OBSERVATION PER HR: HCPCS

## 2023-04-23 PROCEDURE — 97530 THERAPEUTIC ACTIVITIES: CPT

## 2023-04-23 PROCEDURE — 96372 THER/PROPH/DIAG INJ SC/IM: CPT

## 2023-04-23 PROCEDURE — 85027 COMPLETE CBC AUTOMATED: CPT | Performed by: INTERNAL MEDICINE

## 2023-04-23 PROCEDURE — 85610 PROTHROMBIN TIME: CPT | Performed by: INTERNAL MEDICINE

## 2023-04-23 PROCEDURE — 93005 ELECTROCARDIOGRAM TRACING: CPT | Performed by: STUDENT IN AN ORGANIZED HEALTH CARE EDUCATION/TRAINING PROGRAM

## 2023-04-23 PROCEDURE — 80048 BASIC METABOLIC PNL TOTAL CA: CPT | Performed by: INTERNAL MEDICINE

## 2023-04-23 PROCEDURE — 36415 COLL VENOUS BLD VENIPUNCTURE: CPT | Performed by: INTERNAL MEDICINE

## 2023-04-23 PROCEDURE — 82550 ASSAY OF CK (CPK): CPT | Performed by: STUDENT IN AN ORGANIZED HEALTH CARE EDUCATION/TRAINING PROGRAM

## 2023-04-23 PROCEDURE — 97166 OT EVAL MOD COMPLEX 45 MIN: CPT

## 2023-04-23 PROCEDURE — 63710000001 PREDNISONE PER 5 MG: Performed by: INTERNAL MEDICINE

## 2023-04-23 RX ORDER — POTASSIUM CHLORIDE 7.45 MG/ML
10 INJECTION INTRAVENOUS
Status: DISCONTINUED | OUTPATIENT
Start: 2023-04-23 | End: 2023-04-24 | Stop reason: HOSPADM

## 2023-04-23 RX ORDER — MEMANTINE HYDROCHLORIDE 10 MG/1
10 TABLET ORAL 2 TIMES DAILY
Status: DISCONTINUED | OUTPATIENT
Start: 2023-04-23 | End: 2023-04-24 | Stop reason: HOSPADM

## 2023-04-23 RX ORDER — METOPROLOL SUCCINATE 100 MG/1
100 TABLET, EXTENDED RELEASE ORAL EVERY MORNING
Status: DISCONTINUED | OUTPATIENT
Start: 2023-04-23 | End: 2023-04-24 | Stop reason: HOSPADM

## 2023-04-23 RX ORDER — MONTELUKAST SODIUM 10 MG/1
10 TABLET ORAL NIGHTLY
Status: DISCONTINUED | OUTPATIENT
Start: 2023-04-23 | End: 2023-04-24 | Stop reason: HOSPADM

## 2023-04-23 RX ORDER — SODIUM CHLORIDE 9 MG/ML
75 INJECTION, SOLUTION INTRAVENOUS CONTINUOUS
Status: ACTIVE | OUTPATIENT
Start: 2023-04-23 | End: 2023-04-24

## 2023-04-23 RX ORDER — MAGNESIUM SULFATE HEPTAHYDRATE 40 MG/ML
2 INJECTION, SOLUTION INTRAVENOUS AS NEEDED
Status: DISCONTINUED | OUTPATIENT
Start: 2023-04-23 | End: 2023-04-24 | Stop reason: HOSPADM

## 2023-04-23 RX ORDER — MAGNESIUM SULFATE HEPTAHYDRATE 40 MG/ML
4 INJECTION, SOLUTION INTRAVENOUS AS NEEDED
Status: DISCONTINUED | OUTPATIENT
Start: 2023-04-23 | End: 2023-04-24 | Stop reason: HOSPADM

## 2023-04-23 RX ORDER — FUROSEMIDE 20 MG/1
20 TABLET ORAL EVERY MORNING
Status: DISCONTINUED | OUTPATIENT
Start: 2023-04-23 | End: 2023-04-23

## 2023-04-23 RX ORDER — TRAZODONE HYDROCHLORIDE 50 MG/1
25 TABLET ORAL EVERY 6 HOURS PRN
Status: DISCONTINUED | OUTPATIENT
Start: 2023-04-23 | End: 2023-04-24 | Stop reason: HOSPADM

## 2023-04-23 RX ORDER — DIVALPROEX SODIUM 125 MG/1
125 TABLET, DELAYED RELEASE ORAL 2 TIMES DAILY
Status: DISCONTINUED | OUTPATIENT
Start: 2023-04-23 | End: 2023-04-24 | Stop reason: HOSPADM

## 2023-04-23 RX ORDER — PREDNISONE 10 MG/1
5 TABLET ORAL EVERY MORNING
Status: DISCONTINUED | OUTPATIENT
Start: 2023-04-23 | End: 2023-04-24 | Stop reason: HOSPADM

## 2023-04-23 RX ORDER — POTASSIUM CHLORIDE 750 MG/1
40 TABLET, FILM COATED, EXTENDED RELEASE ORAL AS NEEDED
Status: DISCONTINUED | OUTPATIENT
Start: 2023-04-23 | End: 2023-04-24 | Stop reason: HOSPADM

## 2023-04-23 RX ORDER — MELATONIN
2000 EVERY MORNING
Status: DISCONTINUED | OUTPATIENT
Start: 2023-04-23 | End: 2023-04-24 | Stop reason: HOSPADM

## 2023-04-23 RX ORDER — TRAZODONE HYDROCHLORIDE 100 MG/1
100 TABLET ORAL NIGHTLY
Status: DISCONTINUED | OUTPATIENT
Start: 2023-04-23 | End: 2023-04-24 | Stop reason: HOSPADM

## 2023-04-23 RX ORDER — POTASSIUM CHLORIDE 750 MG/1
10 TABLET, FILM COATED, EXTENDED RELEASE ORAL EVERY MORNING
Status: DISCONTINUED | OUTPATIENT
Start: 2023-04-23 | End: 2023-04-24 | Stop reason: HOSPADM

## 2023-04-23 RX ORDER — POTASSIUM CHLORIDE 1.5 G/1.77G
40 POWDER, FOR SOLUTION ORAL AS NEEDED
Status: DISCONTINUED | OUTPATIENT
Start: 2023-04-23 | End: 2023-04-24 | Stop reason: HOSPADM

## 2023-04-23 RX ADMIN — MONTELUKAST SODIUM 10 MG: 10 TABLET, FILM COATED ORAL at 21:29

## 2023-04-23 RX ADMIN — PREDNISONE 5 MG: 10 TABLET ORAL at 08:19

## 2023-04-23 RX ADMIN — FUROSEMIDE 20 MG: 20 TABLET ORAL at 08:19

## 2023-04-23 RX ADMIN — MEMANTINE HYDROCHLORIDE 10 MG: 10 TABLET, FILM COATED ORAL at 21:29

## 2023-04-23 RX ADMIN — POTASSIUM CHLORIDE 10 MEQ: 750 TABLET, EXTENDED RELEASE ORAL at 08:21

## 2023-04-23 RX ADMIN — SODIUM CHLORIDE 75 ML/HR: 9 INJECTION, SOLUTION INTRAVENOUS at 17:35

## 2023-04-23 RX ADMIN — ACETAMINOPHEN 650 MG: 325 TABLET, FILM COATED ORAL at 18:12

## 2023-04-23 RX ADMIN — DIVALPROEX SODIUM 125 MG: 125 TABLET, DELAYED RELEASE ORAL at 21:29

## 2023-04-23 RX ADMIN — Medication 2000 UNITS: at 08:19

## 2023-04-23 RX ADMIN — TRAZODONE HYDROCHLORIDE 100 MG: 100 TABLET ORAL at 21:29

## 2023-04-23 RX ADMIN — METOPROLOL SUCCINATE 100 MG: 100 TABLET, EXTENDED RELEASE ORAL at 08:19

## 2023-04-23 RX ADMIN — DIVALPROEX SODIUM 125 MG: 125 TABLET, DELAYED RELEASE ORAL at 08:19

## 2023-04-23 RX ADMIN — MEMANTINE HYDROCHLORIDE 10 MG: 10 TABLET, FILM COATED ORAL at 08:19

## 2023-04-23 NOTE — THERAPY EVALUATION
Patient Name: Margaret Gu  : 1940    MRN: [id number]                              Today's Date: 2023       Admit Date: 2023    Visit Dx:     ICD-10-CM ICD-9-CM   1. Traumatic hematoma of buttock, initial encounter  S30.0XXA 922.32   2. Chronic anticoagulation  Z79.01 V58.61   3. Alzheimer's dementia with agitation, unspecified dementia severity, unspecified timing of dementia onset  G30.9 331.0    F02.811 294.11     Patient Active Problem List   Diagnosis   • Adrenal insufficiency   • PVCs (premature ventricular contractions)   • Essential hypertension   • Depression   • Valvular heart disease   • Mild cognitive impairment   • Chronic paroxysmal hemicrania, not intractable   • Iatrogenic hyperthyroidism   • GERD (gastroesophageal reflux disease)   • COPD (chronic obstructive pulmonary disease) (HCC)   • History of mitral valve repair   • History of tricuspid valve repair   • History of cardiac radiofrequency ablation   • History of cardiac catheterization   • LV dysfunction   • B12 deficiency   • Permanent atrial fibrillation   • Traumatic hematoma of buttock, initial encounter     Past Medical History:   Diagnosis Date   • Adrenal insufficiency    • Anxiety    • Arthritis    • Asthma    • Atrial fibrillation    • CHF (congestive heart failure)    • Colon tumor    • COPD (chronic obstructive pulmonary disease)    • Dementia    • Depression    • Depression    • Difficulty walking    • GERD (gastroesophageal reflux disease)    • Goiter    • Headache, tension-type    • Hypertension    • Hypocalcemia    • Hypokalemia    • Memory loss    • Migraine    • Mild cognitive impairment    • Shortness of breath    • Sleep apnea    • Valvular heart disease      Past Surgical History:   Procedure Laterality Date   • APPENDECTOMY     • BACK SURGERY      6 back surgeries   • BREAST SURGERY     • CARDIAC ABLATION     • CARDIAC ABLATION     • CATARACT EXTRACTION, BILATERAL Bilateral    •  CHOLECYSTECTOMY     • HYSTERECTOMY     • JOINT REPLACEMENT Bilateral     shoulder   • MITRAL VALVE REPAIR/REPLACEMENT  2011   • OOPHORECTOMY     • TONSILLECTOMY AND ADENOIDECTOMY     • TRICUSPID VALVE SURGERY  2011    Repair      General Information     Row Name 04/23/23 1524          OT Time and Intention    Document Type evaluation  -     Mode of Treatment individual therapy;occupational therapy  -     Row Name 04/23/23 1524          General Information    Patient Profile Reviewed yes  -YUNI     Prior Level of Function --  Per nsg pt from a memory care unit. No family present at bedside and pt poor historian. Unable to determine level of assist previously  -     Existing Precautions/Restrictions fall  -     Row Name 04/23/23 1524          Living Environment    People in Home facility resident  memory care unit  -     Row Name 04/23/23 1524          Cognition    Orientation Status (Cognition) oriented to;person  -     Row Name 04/23/23 1524          Safety Issues, Functional Mobility    Impairments Affecting Function (Mobility) balance;cognition;endurance/activity tolerance;strength;pain  -     Comment, Safety Issues/Impairments (Mobility) nonskid socks and gait belt donned  -           User Key  (r) = Recorded By, (t) = Taken By, (c) = Cosigned By    Initials Name Provider Type     Stefany Yung, OT Occupational Therapist                 Mobility/ADL's     Row Name 04/23/23 1525          Bed Mobility    Bed Mobility supine-sit  -     Supine-Sit [place] (Bed Mobility) moderate assist (50% patient effort);1 person assist  -     Row Name 04/23/23 1525          Transfers    Transfers sit-stand transfer;bed-chair transfer  -     Row Name 04/23/23 1525          Bed-Chair Transfer    Bed-Chair [place] (Transfers) minimum assist (75% patient effort);1 person assist  -     Assistive Device (Bed-Chair Transfers) walker, front-wheeled  -     Row Name 04/23/23 1525          Sit-Stand  Transfer    Sit-Stand [place] (Transfers) minimum assist (75% patient effort);moderate assist (50% patient effort);1 person assist  -     Comment, (Sit-Stand Transfer) mod A with 1st STS attempt and min A with 2nd STS attempt  -[facility] Name 04/23/23 1525          Functional Mobility    Functional Mobility- Comment Steps to bedside chair- further distance limited due to increased pain  -[facility] Name 04/23/23 1525          Activities of Daily Living    BADL Assessment/Intervention lower body dressing  -KA     Row Name 04/23/23 1525          Lower Body Dressing Assessment/Training    [place] Level (Lower Body Dressing) lower body dressing skills;doff;don;socks;maximum assist (25% patient effort)  -     Position (Lower Body Dressing) supported sitting  -           User Key  (r) = Recorded By, (t) = Taken By, (c) = Cosigned By    Initials Name Provider Type    Stefany Garber OT Occupational Therapist               Obj/Interventions     [facility] Name 04/23/23 1527          Range of Motion Comprehensive    General Range of Motion bilateral upper extremity ROM WFL  -KA     Row Name 04/23/23 1527          Strength Comprehensive (MMT)    General Manual Muscle Testing (MMT) Assessment upper extremity strength deficits identified  -     Comment, General Manual Muscle Testing (MMT) Assessment --  generalized weakness present. pt unable to understand MMT commands  -[facility] Name 04/23/23 1527          Balance    Balance Assessment sitting static balance;sitting dynamic balance;standing static balance;standing dynamic balance  -     Static Sitting Balance supervision  -     Dynamic Sitting Balance contact guard  -     Static Standing Balance contact guard  -     Dynamic Standing Balance minimal assist  -     Position/Device Used, Standing Balance walker, front-wheeled  -     Balance Interventions sitting;standing  -           User Key  (r) = Recorded By, (t) = Taken By, (c) = Cosigned By     Initials Name Provider Type    Stefany Garber OT Occupational Therapist               Goals/Plan     Row Name 04/23/23 1532          Transfer Goal 1 (OT)    Activity/Assistive Device (Transfer Goal 1, OT) toilet  -KA     Bell Level/Cues Needed (Transfer Goal 1, OT) standby assist  -KA     Time Frame (Transfer Goal 1, OT) short term goal (STG);2 weeks  -KA     Progress/Outcome (Transfer Goal 1, OT) goal ongoing  -     Row Name 04/23/23 1532          Bathing Goal 1 (OT)    Activity/Device (Bathing Goal 1, OT) bathing skills, all  -KA     Bell Level/Cues Needed (Bathing Goal 1, OT) standby assist  -KA     Time Frame (Bathing Goal 1, OT) short term goal (STG);2 weeks  -KA     Progress/Outcomes (Bathing Goal 1, OT) goal ongoing  -[facility] Name 04/23/23 1532          Grooming Goal 1 (OT)    Activity/Device (Grooming Goal 1, OT) grooming skills, all  -KA     Bell (Grooming Goal 1, OT) standby assist  -KA     Time Frame (Grooming Goal 1, OT) short term goal (STG);2 weeks  -KA     Progress/Outcome (Grooming Goal 1, OT) goal ongoing  -[facility] Name 04/23/23 1532          Therapy Assessment/Plan (OT)    Planned Therapy Interventions (OT) activity tolerance training;BADL retraining;cognitive/visual perception retraining;ROM/therapeutic exercise;strengthening exercise;transfer/mobility retraining;functional balance retraining  -           User Key  (r) = Recorded By, (t) = Taken By, (c) = Cosigned By    Initials Name Provider Type    Stefany Garber OT Occupational Therapist               Clinical Impression     Row Name 04/23/23 1528          Pain Assessment    Pre/Posttreatment Pain Comment unrated pain in L hip  -     Row Name 04/23/23 1528          Plan of Care Review    Plan of Care Reviewed With patient  -     Outcome Evaluation Pt seen for OT chelsy this afternoon. Admitted after fall at memory care facility. Hx dementia. Pt A&Ox1 and very pleasant. Pt poor historian unable to  obtain PLOF. Required mod A for bed mobility to bring trunk to alignment. Performed STS initially with mod A and min A with 2nd STS and use of rwx. Nsg present throughout session and obtained orthostatics. Pt took steps to bedside chair with min A and use of rwx to simulate BSC transfer. Increased pain in LLE. Max A to don socks seated EOB. BUE WFL, generalized weakness noted. Pt presents with decreased strength, endurance, activity tolerance, balance, ADL performance and functional mobility.  -     Row Name 04/23/23 1528          Therapy Assessment/Plan (OT)    Rehab Potential (OT) good, to achieve stated therapy goals  -     Criteria for Skilled Therapeutic Interventions Met (OT) yes  -     Therapy Frequency (OT) 5 times/wk  -     Row Name 04/23/23 1528          Therapy Plan Review/Discharge Plan (OT)    Anticipated Discharge Disposition (OT) [facility] nursing facility  -     Row Name 04/23/23 1528          Vital Signs    Pre Patient Position Supine  -KA     Intra Patient Position Standing  -KA     Post Patient Position Sitting  -     Row Name 04/23/23 1528          Positioning and Restraints    Pre-Treatment Position in bed  -KA     Post Treatment Position chair  -KA     In Chair notified nsg;reclined;call light within reach;encouraged to call for assist;exit alarm on;with nsg  -           User Key  (r) = Recorded By, (t) = Taken By, (c) = Cosigned By    Initials Name Provider Type    Stefany Garber, OT Occupational Therapist               Outcome Measures     Row Name 04/23/23 1533          How much help from another is currently needed...    Putting on and taking off regular lower body clothing? 2  -KA     Bathing (including washing, rinsing, and drying) 2  -KA     Toileting (which includes using toilet bed pan or urinal) 2  -KA     Putting on and taking off regular upper body clothing 3  -KA     Taking care of personal grooming (such as brushing teeth) 3  -KA     Eating meals 3  -KA     AM-PAC  6 Clicks Score (OT) 15  -KA     Row Name 04/23/23 0819          How much help from another person do you currently need...    Turning from your back to your side while in flat bed without using bedrails? 2  -HD     Moving from lying on back to sitting on the side of a flat bed without bedrails? 2  -HD     Moving to and from a bed to a chair (including a wheelchair)? 2  -HD     Standing up from a chair using your arms (e.g., wheelchair, bedside chair)? 2  -HD     Climbing 3-5 steps with a railing? 1  -HD     To walk in hospital room? 2  -HD     AM-PAC 6 Clicks Score (PT) 11  -HD     Highest level of mobility 4 --> Transferred to chair/commode  -HD     Row Name 04/23/23 1533          Functional Assessment    Outcome Measure Options AM-PAC 6 Clicks Daily Activity (OT)  -           User Key  (r) = Recorded By, (t) = Taken By, (c) = Cosigned By    Initials Name Provider Type     Rosaline Santoyo, RN Registered Nurse    Stefany Garber OT Occupational Therapist                Occupational Therapy Education     Title: PT OT SLP Therapies (Done)     Topic: Occupational Therapy (Done)     Point: ADL training (Done)     Description:   Instruct learner(s) on proper safety adaptation and remediation techniques during self care or transfers.   Instruct in proper use of assistive devices.              Learning Progress Summary           Patient Acceptance, E, VU,NR by YUNI at 4/23/2023 1533                   Point: Home exercise program (Done)     Description:   Instruct learner(s) on appropriate technique for monitoring, assisting and/or progressing therapeutic exercises/activities.              Learning Progress Summary           Patient Acceptance, E, VU,NR by YUNI at 4/23/2023 1533                               User Key     Initials Effective Dates Name Provider Type Discipline    YUNI 09/22/22 -  Stefany Yung OT Occupational Therapist OT              OT Recommendation and Plan  Planned Therapy Interventions (OT):  activity tolerance training, BADL retraining, cognitive/visual perception retraining, ROM/therapeutic exercise, strengthening exercise, transfer/mobility retraining, functional balance retraining  Therapy Frequency (OT): 5 times/wk  Plan of Care Review  Plan of Care Reviewed With: patient  Outcome Evaluation: Pt seen for OT eval this afternoon. Admitted after fall at memory care facility. Hx dementia. Pt A&Ox1 and very pleasant. Pt poor historian unable to obtain PLOF. Required mod A for bed mobility to bring trunk to alignment. Performed STS initially with mod A and min A with 2nd STS and use of rwx. Nsg present throughout session and obtained orthostatics. Pt took steps to bedside chair with min A and use of rwx to simulate BSC transfer. Increased pain in LLE. Max A to don socks seated EOB. BUE WFL, generalized weakness noted. Pt presents with decreased strength, endurance, activity tolerance, balance, ADL performance and functional mobility.     Time Calculation:    Time Calculation- OT     Row Name 04/23/23 1534             Time Calculation- OT    OT Start Time 1457  -KA      OT Stop Time 1519  -KA      OT Time Calculation (min) 22 min  -KA      Total Timed Code Minutes- OT 12 minute(s)  -KA      OT Received On 04/23/23  -KA      OT - Next Appointment 04/24/23  -KA      OT Goal Re-Cert Due Date 05/01/23  -KA         Timed Charges    54446 - OT Therapeutic Activity Minutes 12  -KA      26801 - OT Self Care/Mgmt Minutes 10  -KA         Total Minutes    Timed Charges Total Minutes 22  -KA       Total Minutes 22  -KA            User Key  (r) = Recorded By, (t) = Taken By, (c) = Cosigned By    Initials Name Provider Type    Stefany Garber OT Occupational Therapist              Therapy Charges for Today     Code Description Service Date Service Provider Modifiers Qty    [id number]  OT THERAPEUTIC ACT EA 15 MIN 4/23/2023 Stefany Yung OT GO 1    13928698636 HC OT EVAL MOD COMPLEXITY 2 4/23/2023 Dilshad  Stefany, OT GO 1               Stefany Yung, OT  4/23/2023

## 2023-04-23 NOTE — PROGRESS NOTES
Name: Margaret Gu ADMIT: 2023   : 1940  PCP: Santhosh Rubio MD    MRN: [id number] LOS: 0 days   AGE/SEX: 82 y.o. female  ROOM: [facility]     Subjective   Subjective   Patient seen and examined this morning.  Son at bedside.  Hospital day 1.  At time of examination, patient is awake, resting in bed, pleasantly confused, endorsing left buttocks pain secondary to fall at home prior to arrival.       Objective   Objective   Vital Signs  Temp:  [97.4 °F (36.3 °C)-98.3 °F (36.8 °C)] 98.1 °F (36.7 °C)  Heart Rate:  [61-94] 85  Resp:  [16-18] 18  BP: ()/(49-90) 112/60  SpO2:  [90 %-99 %] 93 %  on   ;   Device (Oxygen Therapy): room air  Body mass index is 26.6 kg/m².  Physical Exam  Vitals and nursing note reviewed.   Constitutional:       General: She is not in acute distress.     Appearance: She is ill-appearing.   HENT:      Head: Atraumatic.   Eyes:      General: No scleral icterus.     Conjunctiva/sclera: Conjunctivae normal.      Pupils: Pupils are equal, round, and reactive to light.   Cardiovascular:      Rate and Rhythm: Normal rate and regular rhythm.      Pulses: Normal pulses.      Heart sounds: Normal heart sounds.   Pulmonary:      Effort: Pulmonary effort is normal. No respiratory distress.      Breath sounds: Normal breath sounds. No wheezing.   Abdominal:      General: Bowel sounds are normal. There is no distension.      Palpations: Abdomen is soft.      Tenderness: There is no abdominal tenderness.   Musculoskeletal:         General: Tenderness (left buttocks region) and signs of injury present.      Right lower leg: No edema.      Left lower leg: No edema.   Skin:     General: Skin is warm and dry.      Coloration: Skin is pale.      Findings: Bruising (scattered and large hematoma on left buttocks region) present.   Neurological:      General: No focal deficit present.      Mental Status: She is alert. She is disoriented and confused.       Results Review     I  reviewed the patient's new clinical results.  Results from last 7 days   Lab Units 04/23/23  0500 04/22/23  1727   WBC 10*3/mm3 6.30 8.61   HEMOGLOBIN g/dL 9.9* 11.6*   PLATELETS 10*3/mm3 147 155     Results from last 7 days   Lab Units 04/23/23  0500 04/22/23  1727   SODIUM mmol/L 141 142   POTASSIUM mmol/L 3.5 3.6   CHLORIDE mmol/L 103 101   CO2 mmol/L 30.0* 32.0*   BUN mg/dL 18 20   CREATININE mg/dL 0.61 0.69   GLUCOSE mg/dL 88 104*   EGFR mL/min/1.73 88.8 86.2     Results from last 7 days   Lab Units 04/22/23  1727   ALBUMIN g/dL 3.8   BILIRUBIN mg/dL 0.6   ALK PHOS U/L 117   AST (SGOT) U/L 20   ALT (SGPT) U/L 11     Results from last 7 days   Lab Units 04/23/23  0500 04/22/23  1727   CALCIUM mg/dL 8.5* 8.9   ALBUMIN g/dL  --  3.8       No results found for: HGBA1C, POCGLU    CT Head Without Contrast    Result Date: 4/22/2023   No acute intracranial hemorrhage or hydrocephalus. No acute cervical fracture identified; degenerative changes in the cervical spine. If there is further clinical concern, MRI could be considered for further evaluation.  This report was finalized on 4/22/2023 3:41 PM by Dr. Thanh Finley M.D.      CT Cervical Spine Without Contrast    Result Date: 4/22/2023   No acute intracranial hemorrhage or hydrocephalus. No acute cervical fracture identified; degenerative changes in the cervical spine. If there is further clinical concern, MRI could be considered for further evaluation.  This report was finalized on 4/22/2023 3:41 PM by Dr. Thanh Finley M.D.      XR Hip With or Without Pelvis 2 - 3 View Left    Result Date: 4/22/2023   As described.  This report was finalized on 4/22/2023 3:19 PM by Dr. Thanh Finley M.D.      I have personally reviewed all medications:  Scheduled Medications  cholecalciferol, 2,000 Units, Oral, QAM  divalproex, 125 mg, Oral, BID  furosemide, 20 mg, Oral, QAM  memantine, 10 mg, Oral, BID  metoprolol succinate XL, 100 mg, Oral, QAM  montelukast, 10 mg,  Oral, Nightly  potassium chloride, 10 mEq, Oral, QAM  predniSONE, 5 mg, Oral, QAM  traZODone, 100 mg, Oral, Nightly    Infusions   Diet  Diet: Cardiac Diets; Healthy Heart (2-3 Na+); Texture: Regular Texture (IDDSI 7); Fluid Consistency: Thin (IDDSI 0)    I have personally reviewed:  [x]  Laboratory   [x]  Radiology   [x]  EKG/Telemetry  [x]  Cardiology/Vascular   [x]  Records       Assessment/Plan     Active Hospital Problems    Diagnosis  POA   • **Traumatic hematoma of buttock, initial encounter [S30.0XXA]  Yes   • Dementia [F03.90]  Yes   • Falls [W19.XXXA]  Yes   • Orthostatic hypotension [I95.1]  Yes   • Long term current use of anticoagulant therapy [Z79.01]  Not Applicable   • Anemia [D64.9]  Yes   • Permanent atrial fibrillation [I48.21]  Yes   • History of mitral valve repair [Z98.890]  Not Applicable   • History of tricuspid valve repair [Z98.890]  Not Applicable   • LV dysfunction [I51.9]  Yes   • History of cardiac radiofrequency ablation [Z98.890]  Not Applicable   • GERD (gastroesophageal reflux disease) [K21.9]  Yes   • COPD (chronic obstructive pulmonary disease) (HCC) [J44.9]  Yes   • Essential hypertension [I10]  Yes   • Valvular heart disease [I38]  Yes   • Adrenal insufficiency [E27.40]  Yes      Resolved Hospital Problems   No resolved problems to display.       82 y.o. female admitted with Traumatic hematoma of buttock, initial encounter.    Repeated falls/debility  Traumatic hematoma of buttock  - Discussed with patient's son, she lives in memory care unit at [facility], reportedly has had increasing falls recently.  - Upon arrival, patient underwent further work-up after suffering a fall.  CT head, CT cervical spine negative for evidence of acute pathology.  Hip x-ray negative for acute fracture.  CT pelvis obtained showing large hematoma at the subcutaneous tissues of the left buttock measuring 9 x 7.5 cm and the craniocaudad span of the hematoma is 7.6 cm. There is also ill-defined fluid  and much smaller hematomas throughout the surrounding subcutaneous tissues of the left buttock and uppermost thigh.  - Consult PT/OT.   - Frequent turns, Fall precautions, closely monitor hematoma for worsening.    Dementia without behavioral disturbance  - Appears to be at baseline currently, per family. No evidence of agitation.   - Continue current treatments as prescribed.  - Delirium precautions: Evidence-based delirium precautions include: Frequent reorientation, reminding patient not questioning patient, Shades up during day/down at night, TV off except for soft music only, Familiar objects at bedside, Encourage friends/family to spend the night, Minimize anticholingeric medications ,Minimize polypharmacy, Minimize restraints, including lines and/or foleys and/or feeding tubes as able, Minimize overnight checks/vitals to encourage restful consistent sleep patterns, Out of bed during day as much as possible   - Strict I/O, aspiration precautions, telemetry, pulse oximetry, neuro checks, fall precautions, acute urinary retention protocol, vitals as ordered.    History of hypertension, now with orthostatic hypotension  - This morning, orthostatic vital signs obtained, patient BP dropped from 129/66 lying to 101/85 standing, heart rate remained stable 79-83, patient without complaints of dizziness or lightheadedness. She is on Lasix 20 mg daily at home in setting of chronic known LV dysfunction, however, son and daughter concerned that patient is not taking in sufficient oral intake and could be dehydrated.  - Stop Lasix for now.  - Give IVF, will run at low rate overnight, want to prevent volume overload in setting of heart failure.  - Repeat orthostatics in AM.  - Closely monitor, fall precautions, telemetry, pulse oximetry.    Acute on chronic anemia  - Hemoglobin low on most recent labs, worse from prior, likely 2/2 hematoma formation from fall. No other evidence of overt blood loss. Need to closely monitor,  patient on warfarin as outpatient, has history of prior GI bleeding in past per chart review.  - Has history of B12 deficiency, will repeat testing now.  - Continue to hold warfarin at present until hemoglobin stabilizes.   - Order repeat CBC in AM for reassessment. Continue to monitor, transfuse for hemoglobin <7.    Atrial fibrillation on long term anticoagulation  - [id number] score: 5.  - HAS BLED score: 3.  - Vitals, telemetry reviewed, patient appears to be stable, rate controlled on current treatment regimen with Toprol XL.  - Most recent INR 2.27, currently holding in setting of significant hematoma and worsening hemoglobin. Monitor INR daily, resume when appropriate.  - Otherwise, continue current treatment as prescribed. Continue telemetry monitoring.    History of adrenal insufficiency  - Continue chronic prednisone as prescribed.      · SCDs for DVT prophylaxis.  · Limited code (no CPR, no intubation).  · Discussed with patient, family and nursing staff.  · Anticipate discharge TBD timing yet to be determined.      Quan Higgins DO  [facility]  04/23/23  08:45 EDT

## 2023-04-23 NOTE — PLAN OF CARE
Goal Outcome Evaluation:  Plan of Care Reviewed With: patient           Outcome Evaluation: Pt seen for OT chelsy this afternoon. Admitted after fall at [facility]. Hx dementia. Pt A&Ox1 and very pleasant. Pt poor historian unable to obtain PLOF. Required mod A for bed mobility to bring trunk to alignment. Performed STS initially with mod A and min A with 2nd STS and use of rwx. Nsg present throughout session and obtained orthostatics. Pt took steps to bedside chair with min A and use of rwx to simulate BSC transfer. Increased pain in LLE. Max A to don socks seated EOB. BUE WFL, generalized weakness noted. Pt presents with decreased strength, endurance, activity tolerance, balance, ADL performance and functional mobility.

## 2023-04-24 VITALS
HEART RATE: 87 BPM | WEIGHT: 149.91 LBS | HEIGHT: 64 IN | OXYGEN SATURATION: 95 % | DIASTOLIC BLOOD PRESSURE: 58 MMHG | TEMPERATURE: 99.4 F | SYSTOLIC BLOOD PRESSURE: 129 MMHG | RESPIRATION RATE: 18 BRPM | BODY MASS INDEX: 25.59 KG/M2

## 2023-04-24 LAB
ANION GAP SERPL CALCULATED.3IONS-SCNC: 9 MMOL/L (ref 5–15)
BUN SERPL-MCNC: 13 MG/DL (ref 8–23)
BUN/CREAT SERPL: 23.2 (ref 7–25)
CALCIUM SPEC-SCNC: 8.7 MG/DL (ref 8.6–10.5)
CHLORIDE SERPL-SCNC: 105 MMOL/L (ref 98–107)
CO2 SERPL-SCNC: 26 MMOL/L (ref 22–29)
CREAT SERPL-MCNC: 0.56 MG/DL (ref 0.57–1)
DEPRECATED RDW RBC AUTO: 41.5 FL (ref 37–54)
EGFRCR SERPLBLD CKD-EPI 2021: 90.7 ML/MIN/1.73
ERYTHROCYTE [DISTWIDTH] IN BLOOD BY AUTOMATED COUNT: 12.1 % (ref 12.3–15.4)
FOLATE SERPL-MCNC: >20 NG/ML (ref 4.78–24.2)
GLUCOSE SERPL-MCNC: 81 MG/DL (ref 65–99)
HCT VFR BLD AUTO: 27.3 % (ref 34–46.6)
HGB BLD-MCNC: 9 G/DL (ref 12–15.9)
INR PPP: 1.79 (ref 0.9–1.1)
MAGNESIUM SERPL-MCNC: 2.1 MG/DL (ref 1.6–2.4)
MCH RBC QN AUTO: 30.7 PG (ref 26.6–33)
MCHC RBC AUTO-ENTMCNC: 33 G/DL (ref 31.5–35.7)
MCV RBC AUTO: 93.2 FL (ref 79–97)
PHOSPHATE SERPL-MCNC: 2.6 MG/DL (ref 2.5–4.5)
PLATELET # BLD AUTO: 130 10*3/MM3 (ref 140–450)
PMV BLD AUTO: 11.7 FL (ref 6–12)
POTASSIUM SERPL-SCNC: 3.5 MMOL/L (ref 3.5–5.2)
PROTHROMBIN TIME: 21.1 SECONDS (ref 11.7–14.2)
QT INTERVAL: 514 MS
RBC # BLD AUTO: 2.93 10*6/MM3 (ref 3.77–5.28)
SODIUM SERPL-SCNC: 140 MMOL/L (ref 136–145)
TSH SERPL DL<=0.05 MIU/L-ACNC: 0.59 UIU/ML (ref 0.27–4.2)
VIT B12 BLD-MCNC: 523 PG/ML (ref 211–946)
WBC NRBC COR # BLD: 7.25 10*3/MM3 (ref 3.4–10.8)

## 2023-04-24 PROCEDURE — 97530 THERAPEUTIC ACTIVITIES: CPT

## 2023-04-24 PROCEDURE — G0378 HOSPITAL OBSERVATION PER HR: HCPCS

## 2023-04-24 PROCEDURE — 63710000001 PREDNISONE PER 5 MG: Performed by: INTERNAL MEDICINE

## 2023-04-24 PROCEDURE — 82746 ASSAY OF FOLIC ACID SERUM: CPT | Performed by: STUDENT IN AN ORGANIZED HEALTH CARE EDUCATION/TRAINING PROGRAM

## 2023-04-24 PROCEDURE — 84443 ASSAY THYROID STIM HORMONE: CPT | Performed by: STUDENT IN AN ORGANIZED HEALTH CARE EDUCATION/TRAINING PROGRAM

## 2023-04-24 PROCEDURE — 85610 PROTHROMBIN TIME: CPT | Performed by: INTERNAL MEDICINE

## 2023-04-24 PROCEDURE — 97162 PT EVAL MOD COMPLEX 30 MIN: CPT

## 2023-04-24 PROCEDURE — 84100 ASSAY OF PHOSPHORUS: CPT | Performed by: STUDENT IN AN ORGANIZED HEALTH CARE EDUCATION/TRAINING PROGRAM

## 2023-04-24 PROCEDURE — 36415 COLL VENOUS BLD VENIPUNCTURE: CPT | Performed by: INTERNAL MEDICINE

## 2023-04-24 PROCEDURE — 83735 ASSAY OF MAGNESIUM: CPT | Performed by: STUDENT IN AN ORGANIZED HEALTH CARE EDUCATION/TRAINING PROGRAM

## 2023-04-24 PROCEDURE — 82607 VITAMIN B-12: CPT | Performed by: STUDENT IN AN ORGANIZED HEALTH CARE EDUCATION/TRAINING PROGRAM

## 2023-04-24 PROCEDURE — 80048 BASIC METABOLIC PNL TOTAL CA: CPT | Performed by: STUDENT IN AN ORGANIZED HEALTH CARE EDUCATION/TRAINING PROGRAM

## 2023-04-24 PROCEDURE — 85025 COMPLETE CBC W/AUTO DIFF WBC: CPT | Performed by: STUDENT IN AN ORGANIZED HEALTH CARE EDUCATION/TRAINING PROGRAM

## 2023-04-24 RX ORDER — OLANZAPINE 10 MG/1
2.5 INJECTION, POWDER, LYOPHILIZED, FOR SOLUTION INTRAMUSCULAR ONCE AS NEEDED
Status: DISCONTINUED | OUTPATIENT
Start: 2023-04-24 | End: 2023-04-24

## 2023-04-24 RX ORDER — OLANZAPINE 2.5 MG/1
2.5 TABLET ORAL NIGHTLY
Status: DISCONTINUED | OUTPATIENT
Start: 2023-04-24 | End: 2023-04-24 | Stop reason: HOSPADM

## 2023-04-24 RX ORDER — OLANZAPINE 10 MG/1
5 INJECTION, POWDER, LYOPHILIZED, FOR SOLUTION INTRAMUSCULAR ONCE AS NEEDED
Status: COMPLETED | OUTPATIENT
Start: 2023-04-24 | End: 2023-04-24

## 2023-04-24 RX ADMIN — PREDNISONE 5 MG: 10 TABLET ORAL at 09:04

## 2023-04-24 RX ADMIN — METOPROLOL SUCCINATE 100 MG: 100 TABLET, EXTENDED RELEASE ORAL at 09:04

## 2023-04-24 RX ADMIN — DIVALPROEX SODIUM 125 MG: 125 TABLET, DELAYED RELEASE ORAL at 09:05

## 2023-04-24 RX ADMIN — MEMANTINE HYDROCHLORIDE 10 MG: 10 TABLET, FILM COATED ORAL at 09:04

## 2023-04-24 RX ADMIN — Medication 2000 UNITS: at 09:04

## 2023-04-24 RX ADMIN — ACETAMINOPHEN 650 MG: 325 TABLET, FILM COATED ORAL at 12:00

## 2023-04-24 RX ADMIN — OLANZAPINE 5 MG: 10 INJECTION, POWDER, FOR SOLUTION INTRAMUSCULAR at 13:46

## 2023-04-24 RX ADMIN — POTASSIUM CHLORIDE 10 MEQ: 750 TABLET, EXTENDED RELEASE ORAL at 09:04

## 2023-04-24 NOTE — PLAN OF CARE
Goal Outcome Evaluation:  Plan of Care Reviewed With: patient           Outcome Evaluation: Pt is a 83 y/o F admitted to [facility] [place] after falling at memory care unit. No acute fracture, erosion, or dislocation is identified. Pt received in bed upon arrival, AxOx1, and agreeable to PT eval. Pt has a hx of dementia so unsure PLOF but pt states she ambulates without AD at BL. Pt completed supine to sit with mod A and increased time to perform transfer with c/o 20/10 hip pain. Pt stood requiring CGA and began reaching for objects. Pt trailed RW and ambulated 40' requiring CGA. Pt demo's a slow pace but no LOB. Pt UIC at end of session with all needs in reach. PT [facility]

## 2023-04-24 NOTE — THERAPY EVALUATION
Patient Name: Margaret Gu  : 1940    MRN: [id number]                              Today's Date: 2023       Admit Date: 2023    Visit Dx:     ICD-10-CM ICD-9-CM   1. Traumatic hematoma of buttock, initial encounter  S30.0XXA 922.32   2. Chronic anticoagulation  Z79.01 V58.61   3. Alzheimer's dementia with agitation, unspecified dementia severity, unspecified timing of dementia onset  G30.9 331.0    F02.811 294.11     Patient Active Problem List   Diagnosis   • Adrenal insufficiency   • PVCs (premature ventricular contractions)   • Essential hypertension   • Depression   • Valvular heart disease   • Mild cognitive impairment   • Chronic paroxysmal hemicrania, not intractable   • Iatrogenic hyperthyroidism   • GERD (gastroesophageal reflux disease)   • COPD (chronic obstructive pulmonary disease) (HCC)   • History of mitral valve repair   • History of tricuspid valve repair   • History of cardiac radiofrequency ablation   • History of cardiac catheterization   • LV dysfunction   • B12 deficiency   • Permanent atrial fibrillation   • Traumatic hematoma of buttock, initial encounter   • Dementia   • Falls   • Orthostatic hypotension   • Long term current use of anticoagulant therapy   • Anemia     Past Medical History:   Diagnosis Date   • Adrenal insufficiency    • Anxiety    • Arthritis    • Asthma    • Atrial fibrillation    • CHF (congestive heart failure)    • Colon tumor    • COPD (chronic obstructive pulmonary disease)    • Dementia    • Depression    • Depression    • Difficulty walking    • GERD (gastroesophageal reflux disease)    • Goiter    • Headache, tension-type    • Hypertension    • Hypocalcemia    • Hypokalemia    • Memory loss    • Migraine    • Mild cognitive impairment    • Shortness of breath    • Sleep apnea    • Valvular heart disease      Past Surgical History:   Procedure Laterality Date   • APPENDECTOMY     • BACK SURGERY      6 back surgeries   • BREAST SURGERY      • CARDIAC ABLATION     • CARDIAC ABLATION  2013   • CATARACT EXTRACTION, BILATERAL Bilateral    • CHOLECYSTECTOMY     • HYSTERECTOMY     • JOINT REPLACEMENT Bilateral     shoulder   • MITRAL VALVE REPAIR/REPLACEMENT  2011   • OOPHORECTOMY     • TONSILLECTOMY AND ADENOIDECTOMY     • TRICUSPID VALVE SURGERY  2011    Repair      General Information     Row Name 04/24/23 1012          Physical Therapy Time and Intention    Document Type evaluation  -CS     Mode of Treatment individual therapy;physical therapy  -CS     Row Name 04/24/23 1012          General Information    Patient Profile Reviewed yes  -CS     Prior Level of Function all household mobility;gait;transfer;bed mobility  hx of dementia - pt states she ambulates without AD  -CS     Existing Precautions/Restrictions fall  -CS     Barriers to Rehab cognitive status  -CS     Row Name 04/24/23 1012          Living Environment    People in Home facility resident  memory care unit  -CS     Row Name 04/24/23 1012          Cognition    Orientation Status (Cognition) oriented to;person;verbal cues/prompts needed for orientation  hx of dementia  -CS     Row Name 04/24/23 1012          Safety Issues, Functional Mobility    Safety Issues Affecting Function (Mobility) insight into deficits/self-awareness;judgment;safety precaution awareness;safety precautions follow-through/compliance  -CS     Impairments Affecting Function (Mobility) cognition;endurance/activity tolerance;strength  -CS           User Key  (r) = Recorded By, (t) = Taken By, (c) = Cosigned By    Initials Name Provider Type    CS Gayle Leone PT Physical Therapist               Mobility     Row Name 04/24/23 1013          Bed Mobility    Bed Mobility supine-sit;sit-supine  -CS     Supine-Sit [place] (Bed Mobility) moderate assist (50% patient effort);verbal cues  -CS     Sit-Supine [place] (Bed Mobility) not tested  -CS     Assistive Device (Bed Mobility) bed rails;head of bed elevated  -CS      Comment, (Bed Mobility) increased time with reports of pain during transfer; UIC at end of session  -     Row Name 04/24/23 1013          Sit-Stand Transfer    Sit-Stand [place] (Transfers) contact guard  -CS     Assistive Device (Sit-Stand Transfers) other (see comments)  HHA  -     Row Name 04/24/23 1013          Gait/Stairs (Locomotion)    [place] Level (Gait) contact guard  -CS     Assistive Device (Gait) walker, front-wheeled  -     Distance in Feet (Gait) 40'  -CS     Deviations/Abnormal Patterns (Gait) pedro decreased;gait speed decreased;stride length decreased  -     Bilateral Gait Deviations forward flexed posture  -     [place] Level (Stairs) not tested  -CS     Comment, (Gait/Stairs) slow pace but no LOB; upon standing pt grabbing onto objects so RW utilized while ambulating  -           User Key  (r) = Recorded By, (t) = Taken By, (c) = Cosigned By    Initials Name Provider Type    CS Gayle Leone, PT Physical Therapist               Obj/Interventions     Row Name 04/24/23 1015          Range of Motion Comprehensive    General Range of Motion bilateral lower extremity ROM WFL  -     Row Name 04/24/23 1015          Strength Comprehensive (MMT)    General Manual Muscle Testing (MMT) Assessment other (see comments)  -     Comment, General Manual Muscle Testing (MMT) Assessment generalized weakness; B LE >/= 3/5  -     Row Name 04/24/23 1015          Balance    Balance Assessment sitting static balance;sitting dynamic balance;standing static balance;standing dynamic balance  -     Static Sitting Balance standby assist  -CS     Dynamic Sitting Balance standby assist  -CS     Position, Sitting Balance unsupported;sitting edge of bed  -     Static Standing Balance standby assist  -CS     Dynamic Standing Balance contact guard  -CS     Position/Device Used, Standing Balance supported;walker, front-wheeled  -CS           User Key  (r) = Recorded By, (t) = Taken By,  (c) = Cosigned By    Initials Name Provider Type    CS Gayle Leone, PT Physical Therapist               Goals/Plan     Row Name 04/24/23 1019          Bed Mobility Goal 1 (PT)    Activity/Assistive Device (Bed Mobility Goal 1, PT) sit to supine;supine to sit  -CS     [place] Level/Cues Needed (Bed Mobility Goal 1, PT) contact guard required  -CS     Time Frame (Bed Mobility Goal 1, PT) 1 week  -CS     Row Name 04/24/23 1019          Transfer Goal 1 (PT)    Activity/Assistive Device (Transfer Goal 1, PT) sit-to-stand/stand-to-sit;bed-to-chair/chair-to-bed  -CS     [place] Level/Cues Needed (Transfer Goal 1, PT) standby assist  -CS     Time Frame (Transfer Goal 1, PT) 1 week  -CS     Row Name 04/24/23 1019          Gait Training Goal 1 (PT)    Activity/Assistive Device (Gait Training Goal 1, PT) gait (walking locomotion);assistive device use;decrease fall risk;increase endurance/gait distance  -CS     [place] Level (Gait Training Goal 1, PT) contact guard required  -CS     Distance (Gait Training Goal 1, PT) 75'  -CS     Time Frame (Gait Training Goal 1, PT) 1 week  -CS           User Key  (r) = Recorded By, (t) = Taken By, (c) = Cosigned By    Initials Name Provider Type    CS Gayle Leone, PT Physical Therapist               Clinical Impression     Row Name 04/24/23 1015          Pain    Pretreatment Pain Rating 0/10 - no pain  -CS     Posttreatment Pain Rating 10/10  pt reports 20/10 pain  -CS     Pain Location - Side/Orientation Left  -CS     Pain Location - buttock;hip  -CS     Pain Intervention(s) Rest;Repositioned;Ambulation/increased activity  -CS     Row Name 04/24/23 1015          Plan of Care Review    Plan of Care Reviewed With patient  -CS     Outcome Evaluation Pt is a 83 [facility] F [facility]. No acute fracture, erosion, or dislocation is identified. Pt received in bed upon arrival, AxOx1, and agreeable to PT eval. Pt has a hx of dementia so  unsure PLOF but pt states she ambulates without AD at BL. Pt completed supine to sit with mod A and increased time to perform transfer with c/o 20/10 hip pain. Pt stood requiring CGA and began reaching for objects. Pt trailed RW and ambulated 40' requiring CGA. Pt demo's a slow pace but no LOB. Pt UIC at end of session with all needs in reach. PT recommends pt return to [facility] at D/C.  -CS     Row Name 04/24/23 1015          Therapy Assessment/Plan (PT)    Patient/Family Therapy Goals Statement (PT) to go home  -CS     Rehab Potential (PT) good, to achieve stated therapy goals  -CS     Criteria for Skilled Interventions Met (PT) yes;meets criteria  -CS     Therapy Frequency (PT) 5 times/wk  -CS     Row Name 04/24/23 1015          Positioning and Restraints    Pre-Treatment Position in bed  -CS     Post Treatment Position chair  -CS     In Chair notified nsg;reclined;call light within reach;encouraged to call for assist;exit alarm on  -CS           User Key  (r) = Recorded By, (t) = Taken By, (c) = Cosigned By    Initials Name Provider Type    CS Gayle Leone, PT Physical Therapist               Outcome Measures     Row Name 04/24/23 1020          How much help from another person do you currently need...    Turning from your back to your side while in flat bed without using bedrails? 3  -CS     Moving from lying on back to sitting on the side of a flat bed without bedrails? 2  -CS     Moving to and from a bed to a chair (including a wheelchair)? 3  -CS     Standing up from a chair using your arms (e.g., wheelchair, bedside chair)? 3  -CS     Climbing 3-5 steps with a railing? 2  -CS     To walk in hospital room? 3  -CS     AM-PAC 6 Clicks Score (PT) 16  -CS     Highest level of mobility 5 --> Static standing  -CS     Row Name 04/24/23 1020          Functional Assessment    Outcome Measure Options AM-PAC 6 Clicks Basic Mobility (PT)  -CS           User Key  (r) = Recorded By, (t) = Taken By, (c) = Cosigned  By    Initials Name Provider Type    CS Gayle Leone PT Physical Therapist                             Physical Therapy Education     Title: PT OT SLP Therapies (In Progress)     Topic: Physical Therapy (In Progress)     Point: Mobility training (Done)     Learning Progress Summary           Patient Acceptance, E,TB, VU,DU,NR by  at 4/24/2023 1020                   Point: Home exercise program (Not Started)     Learner Progress:  Not documented in this visit.          Point: Body mechanics (Done)     Learning Progress Summary           Patient Acceptance, E,TB, VU,DU,NR by  at 4/24/2023 1020                   Point: Precautions (Done)     Learning Progress Summary           Patient Acceptance, E,TB, VU,DU,NR by  at 4/24/2023 1020                               User Key     Initials Effective Dates Name Provider Type Discipline     09/22/22 -  Gayle Leone PT Physical Therapist PT              PT Recommendation and Plan     Plan of Care Reviewed With: patient  Outcome Evaluation: Pt is a 83 y/o F admitted to [facility] after falling at memory care unit. No acute fracture, erosion, or dislocation is identified. Pt received in bed upon arrival, AxOx1, and agreeable to PT eval. Pt has a hx of dementia so unsure PLOF but pt states she ambulates without AD at BL. Pt completed supine to sit with mod A and increased time to perform transfer with c/o 20/10 hip pain. Pt stood requiring CGA and began reaching for objects. Pt trailed RW and ambulated 40' requiring CGA. Pt demo's a slow pace but no LOB. Pt UIC at end of session with all needs in reach. PT recommends pt return to [facility] at D/C.     Time Calculation:    PT Charges     Row Name 04/24/23 1021             Time Calculation    Start Time 0945  -      Stop Time 1000  -      Time Calculation (min) 15 min  -CS      PT Received On 04/24/23  -      PT - Next Appointment 04/25/23  -      PT Goal Re-Cert Due Date 05/01/23  -         Time  Calculation- PT    Total Timed Code Minutes- PT 14 minute(s)  -CS         Timed Charges    97192 - PT Therapeutic Activity Minutes 14  -CS         Total Minutes    Timed Charges Total Minutes 14  -CS       Total Minutes 14  -CS            User Key  (r) = Recorded By, (t) = Taken By, (c) = Cosigned By    Initials Name Provider Type    CS Gayle Leone, PT Physical Therapist              Therapy Charges for Today     Code Description Service Date Service Provider Modifiers Qty    13194887870 HC PT THERAPEUTIC ACT EA 15 MIN 4/24/2023 Gayle Leone, PT GP 1    96144710698 HC PT EVAL MOD COMPLEXITY 3 4/24/2023 Gayle Leone, PT GP 1          PT G-Codes  Outcome Measure Options: AM-PAC 6 Clicks Basic Mobility (PT)  AM-PAC 6 Clicks Score (PT): 16  AM-PAC 6 Clicks Score (OT): 15  PT Discharge Summary  Anticipated Discharge Disposition (PT): other (see comments) (memory care unit)    Gayle Leone PT  4/24/2023

## 2023-04-24 NOTE — PLAN OF CARE
Goal Outcome Evaluation:  Plan of Care Reviewed With: patient, daughter, son        Progress: no change  Outcome Evaluation: Patient has been alert and oriented to self and knows children names. Alzheimer's Dx, patient was easily redirected early in day, but as evening progressed increased confusion and becoming harder to redirect. Up in chair and different activities offered to occupy. Family here throughout the day. Patient did work with therapy today and sat in recliner for approx 1 hour. Vital signs stable. Urine culture obtained and sent to lab. Safety  maintained. Moved patient to room closer to nurse's station for safety. Delirium protocol ordered. IVF ordered for 75ml/hr for 500ml. Unfortunately IV started leaking shortly after starting. Attempted replacing with new IV, unsuccessful. Attempting new IV placement.

## 2023-04-24 NOTE — PLAN OF CARE
Goal Outcome Evaluation:  Plan of Care Reviewed With: patient        Progress: no change  Outcome Evaluation: Pt oriented to self only. Awake majority of shift. Pulled IV out and receiving IVF. Bed alarm in place.

## 2023-04-24 NOTE — PROGRESS NOTES
"    Name: Margaret Gu ADMIT: 2023   : 1940  PCP: Santhosh Rubio MD    MRN: [id number] LOS: 0 days   AGE/SEX: 82 y.o. female  ROOM: [facility]     Subjective   Subjective   Sitting up at nursing station.  States \"I am fine as a frog.\"    Objective   Objective   Vital Signs  Temp:  [98.4 °F (36.9 °C)-99.4 °F (37.4 °C)] 99.4 °F (37.4 °C)  Heart Rate:  [73-91] 87  Resp:  [18] 18  BP: (101-134)/(55-85) 129/58     on   ;   Device (Oxygen Therapy): room air  Body mass index is 25.73 kg/m².  Physical Exam  Constitutional:       General: She is not in acute distress.  Cardiovascular:      Rate and Rhythm: Normal rate.      Pulses: Normal pulses.   Pulmonary:      Effort: Pulmonary effort is normal.   Abdominal:      General: Abdomen is flat. There is no distension.      Palpations: Abdomen is soft.   Skin:     General: Skin is warm.      Coloration: Skin is not jaundiced.      Findings: Bruising present.   Neurological:      Mental Status: She is alert. She is disoriented.         Results Review     I reviewed the patient's new clinical results.  Results from last 7 days   Lab Units 23  0546 23  0500 23  1727   WBC 10*3/mm3 7.25 6.30 8.61   HEMOGLOBIN g/dL 9.0* 9.9* 11.6*   PLATELETS 10*3/mm3 130* 147 155     Results from last 7 days   Lab Units 23  0546 23  0500 23  1727   SODIUM mmol/L 140 141 142   POTASSIUM mmol/L 3.5 3.5 3.6   CHLORIDE mmol/L 105 103 101   CO2 mmol/L 26.0 30.0* 32.0*   BUN mg/dL 13 18 20   CREATININE mg/dL 0.56* 0.61 0.69   GLUCOSE mg/dL 81 88 104*   EGFR mL/min/1.73 90.7 88.8 86.2     Results from last 7 days   Lab Units 23  1727   ALBUMIN g/dL 3.8   BILIRUBIN mg/dL 0.6   ALK PHOS U/L 117   AST (SGOT) U/L 20   ALT (SGPT) U/L 11     Results from last 7 days   Lab Units 23  0546 23  0500 23  1727   CALCIUM mg/dL 8.7 8.5* 8.9   ALBUMIN g/dL  --   --  3.8   MAGNESIUM mg/dL 2.1  --   --    PHOSPHORUS mg/dL 2.6  --   --  "       No results found for: HGBA1C, POCGLU    No radiology results for the last day  Scheduled Medications  cholecalciferol, 2,000 Units, Oral, QAM  divalproex, 125 mg, Oral, BID  memantine, 10 mg, Oral, BID  metoprolol succinate XL, 100 mg, Oral, QAM  montelukast, 10 mg, Oral, Nightly  OLANZapine, 2.5 mg, Oral, Nightly  potassium chloride, 10 mEq, Oral, QAM  predniSONE, 5 mg, Oral, QAM  traZODone, 100 mg, Oral, Nightly    Infusions   Diet  Diet: Cardiac Diets; Healthy Heart (2-3 Na+); Texture: Regular Texture (IDDSI 7); Fluid Consistency: Thin (IDDSI 0)       Assessment/Plan     Active Hospital Problems    Diagnosis  POA   • **Traumatic hematoma of buttock, initial encounter [S30.0XXA]  Yes   • Dementia [F03.90]  Yes   • Falls [W19.XXXA]  Yes   • Orthostatic hypotension [I95.1]  Yes   • Long term current use of anticoagulant therapy [Z79.01]  Not Applicable   • Anemia [D64.9]  Yes   • Permanent atrial fibrillation [I48.21]  Yes   • History of mitral valve repair [Z98.890]  Not Applicable   • History of tricuspid valve repair [Z98.890]  Not Applicable   • LV dysfunction [I51.9]  Yes   • History of cardiac radiofrequency ablation [Z98.890]  Not Applicable   • GERD (gastroesophageal reflux disease) [K21.9]  Yes   • COPD (chronic obstructive pulmonary disease) (HCC) [J44.9]  Yes   • Essential hypertension [I10]  Yes   • Valvular heart disease [I38]  Yes   • Adrenal insufficiency [E27.40]  Yes      Resolved Hospital Problems   No resolved problems to display.       82 y.o. female admitted with Traumatic hematoma of buttock, initial encounter.      04/24/23  Hopeful dc to facility 4/25.    I had a long discussion with patient's daughter Maida.  Patient has been having increased falls recently.  I discussed stopping warfarin given increased falls as there is a much higher risk of morbidity from bleeding if she is on this.  We did discuss that this would also increase her risk of a possible stroke due to her atrial  fibrillation, however at this time I feel as though the risks of warfarin are outweighing the benefits.  Daughter was in agreement that patient is on a lot of medicines and she is in agreement with stopping it at this time.    Repeated falls/debility  Traumatic hematoma of buttock  - Upon arrival, patient underwent further work-up after suffering a fall.  CT head, CT cervical spine negative for evidence of acute pathology.  Hip x-ray negative for acute fracture.  CT pelvis obtained showing large hematoma at the subcutaneous tissues of the left buttock measuring 9 x 7.5 cm and the craniocaudad span of the hematoma is 7.6 cm. There is also ill-defined fluid and much smaller hematomas throughout the surrounding subcutaneous tissues of the left buttock and uppermost thigh.     Dementia with behavioral disturbance  - Delirium precautions: Evidence-based delirium precautions include: Frequent reorientation, reminding patient not questioning patient, Shades up during day/down at night, TV off except for soft music only, Familiar objects at bedside, Encourage friends/family to spend the night, Minimize anticholingeric medications ,Minimize polypharmacy, Minimize restraints, including lines and/or foleys and/or feeding tubes as able, Minimize overnight checks/vitals to encourage restful consistent sleep patterns, Out of bed during day as much as possible   -add low dose Zyprexa qhs      Acute on chronic anemia  - Hemoglobin low on most recent labs, worse from prior, likely 2/2 hematoma formation from fall. No other evidence of overt blood loss. Need to closely monitor, patient on warfarin as outpatient, has history of prior GI bleeding in past per chart review.     Atrial fibrillation on long term anticoagulation  -RC: metoprolol  mg  -AC: warfarin; plan to stop on discharge given significant fall risk     History of adrenal insufficiency  - Continue chronic prednisone as prescribed.    · SCDs for DVT  prophylaxis.  · DNR.  · Discussed with patient, family, nursing staff and care team on multidisciplinary rounds.  · Anticipate discharge return to facility in 1-2 days      Ramiro Girard MD  [facility]  04/24/23  14:39 EDT

## 2023-04-24 NOTE — CASE MANAGEMENT/SOCIAL WORK
Discharge Planning Assessment  [facility]     Patient Name: Margaret Gu  MRN: [id number]  Today's Date: 4/24/2023    Admit Date: 4/22/2023    Plan: Return to [facility], piper Gibson can transport   Discharge Needs Assessment    No documentation.                Discharge Plan     Row Name 04/24/23 1221       Plan    Plan Return to [facility], piper Gibson can transport    Plan Comments Pt is currently on a bed hold at [facility] per Carmella TAN/raul. Spoke with daughter Maida Gibson, who would like for pt to return to [facility] at discharge and can transport pt upon discharge.              Continued Care and Services - Admitted Since 4/22/2023     Destination     Service Provider Request Status Selected Services Address Phone Fax Patient Preferred    [facility] Pending - Request Sent N/A [address] [facility] [id number] [phone] --              Expected Discharge Date and Time     Expected Discharge Date Expected Discharge Time    Apr 26, 2023          Demographic Summary    No documentation.                Functional Status    No documentation.                Psychosocial    No documentation.                Abuse/Neglect    No documentation.                Legal    No documentation.                Substance Abuse    No documentation.                Patient Forms    No documentation.                   Fani Espinoza RN

## 2023-04-24 NOTE — DISCHARGE SUMMARY
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]  Not Applicable   • LV dysfunction [I51.9]  Yes   • History of cardiac radiofrequency ablation [Z98.890]  Not Applicable   • GERD (gastroesophageal reflux disease) [K21.9]  Yes   • COPD (chronic obstructive pulmonary disease) (HCC) [J44.9]  Yes   • Essential hypertension [I10]  Yes   • Valvular heart disease [I38]  Yes   • Adrenal insufficiency [E27.40]  Yes      Resolved Hospital Problems    Diagnosis Date Resolved POA   • Orthostatic hypotension [I95.1] 2023 Yes        Brief Admitting HPI     82 year old female who presented to the emergency room after a fall at her nursing home; she has a history of dementia and does not recall the event; she denies any pain presently; a daughter is present to help with the history    Hospital Course     Pt admitted after mechanical fall, found to have large left buttock hematoma.  Her warfarin was held on admission and her hemoglobin stabilized.  Recommended that she have a repeat CBC within 1 week at nursing facility to ensure continued stability. I had a long discussion with patient's daughter Maida.  Patient has been having increased falls recently.  I discussed stopping warfarin given increased falls as there is a much higher risk of morbidity from bleeding if she is on this, as well as her prior  history of GIB on AC.  We did discuss that this would also increase her risk of a possible stroke due to her atrial fibrillation, however at this time I feel as though the risks of warfarin are outweighing the benefits.  Daughter was in agreement that patient is on a lot of medicines and she is in agreement with stopping it at this time. She briefly had an episode of behavior disturbance though quickly responded to IV Zyprexa, and had no other issues. Discussed with daughter and at this time it is absolutely prudent to return her to familiar facility in order to decrease risk of hospital acquired delirium and further complications. She is stable for discharge back to [facility] at this time. She will be discharged with compression stockings given findings of orthostatic hypotension which did improve with IVF resuscitation. However she remains at risk of continued falls moving forward, especially in setting of chronic AI.      Discharge Plan     Repeated falls/debility  Traumatic hematoma of buttock  Acute on chronic anemia  - Upon arrival, patient underwent further work-up after suffering a fall.  CT head, CT cervical spine negative for evidence of acute pathology.  Hip x-ray negative for acute fracture.  CT pelvis obtained showing large hematoma at the subcutaneous tissues of the left buttock measuring 9 x 7.5 cm and the craniocaudad span of the hematoma is 7.6 cm. There is also ill-defined fluid and much smaller hematomas throughout the surrounding subcutaneous tissues of the left buttock and uppermost thigh  -Hb 11.6 > 9.9 > 9  -2/2 hematoma; no longer expanding and no concern for ongoing bleeding  -recommend repeat CBC within 1 wk post dc to ensure stability     Atrial fibrillation on long term anticoagulation  -RC: metoprolol  mg  -AC: warfarin; plan to stop on discharge given significant fall risk and hx of GIB     History of adrenal insufficiency  - Continue chronic prednisone as  "prescribed    Day of Discharge     Subjective:  Sitting up at nursing station.  States \"I am fine as a frog.\"    Physical Exam:  Temp:  [98.4 °F (36.9 °C)-99.4 °F (37.4 °C)] 99.4 °F (37.4 °C)  Heart Rate:  [85-91] 87  Resp:  [18] 18  BP: (110-134)/(55-70) 129/58  Body mass index is 25.73 kg/m².  Physical Exam  Constitutional:       General: She is not in acute distress.  Cardiovascular:      Rate and Rhythm: Normal rate.      Pulses: Normal pulses.   Pulmonary:      Effort: Pulmonary effort is normal.   Abdominal:      General: Abdomen is flat. There is no distension.      Palpations: Abdomen is soft.   Skin:     General: Skin is warm.      Coloration: Skin is not jaundiced.      Findings: Bruising present.   Neurological:      Mental Status: She is alert. She is disoriented.     Consultants     Consult Orders (all) (From admission, onward)     Start     Ordered    04/22/23 1809  [facility] (on-call MD unless specified) Details  Once        Specialty:  Hospitalist  Provider:  Dulce Prince MD    04/22/23 1809              Procedures     * Surgery not found *      Imaging Results (All)     Procedure Component Value Units Date/Time    CT Pelvis Without Contrast [[id number]] Collected: 04/22/23 1611     Updated: 04/24/23 0734    Narrative:      CT PELVIS WITHOUT IV CONTRAST     HISTORY: 82-year-old female status post fall. Pelvic pain.     TECHNIQUE: Radiation dose reduction techniques were utilized, including  automated exposure control and exposure modulation based on body size.   3 mm images were obtained through the pelvis without the administration  of IV contrast. Compared with today's radiographs of the left hip.     FINDINGS: There is no convincing evidence for an acute fracture of the  left hip or the pelvic bones. There is a large hematoma at the  subcutaneous tissues of the left buttock measuring approximately 9 x 7.5  cm and the craniocaudad span of the hematoma is 7.6 cm. There is also  ill-defined fluid " and much smaller hematomas throughout the surrounding  subcutaneous tissues of the left buttock and uppermost thigh.     This report was finalized on 4/24/2023 7:31 AM by Dr. Jennifer Coreas M.D.       CT Head Without Contrast [[id number]] Collected: 04/22/23 1526     Updated: 04/22/23 1544    Narrative:      CT HEAD WO CONTRAST-, CT CERVICAL SPINE WO CONTRAST-     INDICATIONS: Trauma     TECHNIQUE: Radiation dose reduction techniques were utilized, including  automated exposure control and exposure modulation based on body size.  Noncontrast head CT, cervical spine CT     COMPARISON: None available     FINDINGS:     Head CT:     No acute intracranial hemorrhage, midline shift or mass effect. No acute  territorial infarct is identified. Small old lacunar infarct is apparent  in right basal ganglia. Bilateral basal ganglia mineralization is  present.     Mild periventricular hypodensities suggest chronic small vessel ischemic  change in a patient this age.     Arterial calcifications are seen at the base of the brain.     Ventricles, cisterns, cerebral sulci are unremarkable for patient age.     The visualized paranasal sinuses, orbits, mastoid air cells are  unremarkable.     Degenerative changes are seen at the temporomandibular joints.        Cervical spine CT:     No acute fracture is identified. Alignment is in range of normal.     Facet and uncovertebral joint hypertrophy contributes to neuroforaminal  narrowing, more prominent on the right at C5/6, and on the left at C6/7.     Bilateral carotid arterial calcifications are present.             Impression:         No acute intracranial hemorrhage or hydrocephalus. No acute cervical  fracture identified; degenerative changes in the cervical spine. If  there is further clinical concern, MRI could be considered for further  evaluation.     This report was finalized on 4/22/2023 3:41 PM by Dr. Thanh Finley M.D.       CT Cervical Spine Without Contrast [[id number]]  Collected: 04/22/23 1526     Updated: 04/22/23 1544    Narrative:      CT HEAD WO CONTRAST-, CT CERVICAL SPINE WO CONTRAST-     INDICATIONS: Trauma     TECHNIQUE: Radiation dose reduction techniques were utilized, including  automated exposure control and exposure modulation based on body size.  Noncontrast head CT, cervical spine CT     COMPARISON: None available     FINDINGS:     Head CT:     No acute intracranial hemorrhage, midline shift or mass effect. No acute  territorial infarct is identified. Small old lacunar infarct is apparent  in right basal ganglia. Bilateral basal ganglia mineralization is  present.     Mild periventricular hypodensities suggest chronic small vessel ischemic  change in a patient this age.     Arterial calcifications are seen at the base of the brain.     Ventricles, cisterns, cerebral sulci are unremarkable for patient age.     The visualized paranasal sinuses, orbits, mastoid air cells are  unremarkable.     Degenerative changes are seen at the temporomandibular joints.        Cervical spine CT:     No acute fracture is identified. Alignment is in range of normal.     Facet and uncovertebral joint hypertrophy contributes to neuroforaminal  narrowing, more prominent on the right at C5/6, and on the left at C6/7.     Bilateral carotid arterial calcifications are present.             Impression:         No acute intracranial hemorrhage or hydrocephalus. No acute cervical  fracture identified; degenerative changes in the cervical spine. If  there is further clinical concern, MRI could be considered for further  evaluation.     This report was finalized on 4/22/2023 3:41 PM by Dr. Thanh Finley M.D.       XR Hip With or Without Pelvis 2 - 3 View Left [[id number]] Collected: 04/22/23 1514     Updated: 04/22/23 1522    Narrative:      XR HIP W OR WO PELVIS 2-3 VIEW LEFT-     INDICATIONS: Trauma     TECHNIQUE: Frontal view the pelvis, 2 views of the left hip     COMPARISON: None  available     FINDINGS:     No acute fracture, erosion, or dislocation is identified. Right hip  arthroplasty hardware is partly included. Left hip joint space appears  preserved. Degenerative change is seen at the symphysis pubis and partly  included lumbar spine. Follow-up/further evaluation can be obtained as  indications persist.       Impression:         As described.     This report was finalized on 4/22/2023 3:19 PM by Dr. Thanh Finley M.D.           Results for orders placed during the hospital encounter of 12/14/22    Duplex Venous Lower Extremity - Right    Interpretation Summary  •  Normal right lower extremity venous duplex scan.    Results for orders placed during the hospital encounter of 10/22/20    Adult Transthoracic Echo Complete W/ Cont if Necessary Per Protocol    Interpretation Summary  · Calculated left ventricular EF = 49.2% Estimated left ventricular EF was in agreement with the calculated left ventricular EF. Left ventricular systolic function is mildly decreased.  · The left atrial cavity is moderate to severely dilated.  · The right atrial cavity is moderate to severely dilated.  · Mild aortic valve regurgitation is present.  · There is a mitral valve ring present.  · Mild mitral valve regurgitation is present with a centrally-directed jet noted.  · There is evidence of previous tricuspid valve repair.  · Mild tricuspid valve regurgitation is present.  · Estimated right ventricular systolic pressure from tricuspid regurgitation is normal (<35 mmHg).    Pertinent Labs     Results from last 7 days   Lab Units 04/24/23  0546 04/23/23  0500 04/22/23  1727   WBC 10*3/mm3 7.25 6.30 8.61   HEMOGLOBIN g/dL 9.0* 9.9* 11.6*   PLATELETS 10*3/mm3 130* 147 155     Results from last 7 days   Lab Units 04/24/23  0546 04/23/23  0500 04/22/23  1727   SODIUM mmol/L 140 141 142   POTASSIUM mmol/L 3.5 3.5 3.6   CHLORIDE mmol/L 105 103 101   CO2 mmol/L 26.0 30.0* 32.0*   BUN mg/dL 13 18 20   CREATININE  mg/dL 0.56* 0.61 0.69   GLUCOSE mg/dL 81 88 104*   EGFR mL/min/1.73 90.7 88.8 86.2     Results from last 7 days   Lab Units 04/22/23  1727   ALBUMIN g/dL 3.8   BILIRUBIN mg/dL 0.6   ALK PHOS U/L 117   AST (SGOT) U/L 20   ALT (SGPT) U/L 11     Results from last 7 days   Lab Units 04/24/23  0546 04/23/23  0500 04/22/23  1727   CALCIUM mg/dL 8.7 8.5* 8.9   ALBUMIN g/dL  --   --  3.8   MAGNESIUM mg/dL 2.1  --   --    PHOSPHORUS mg/dL 2.6  --   --        Results from last 7 days   Lab Units 04/23/23  0500   CK TOTAL U/L 86           Invalid input(s): LDLCALC          Test Results Pending at Discharge       Discharge Details        Discharge Medications      Changes to Medications      Instructions Start Date   furosemide 20 MG tablet  Commonly known as: LASIX  What changed: when to take this   20 mg, Oral, Daily      metoprolol succinate  MG 24 hr tablet  Commonly known as: TOPROL-XL  What changed: when to take this   100 mg, Oral, Daily      potassium chloride 10 MEQ CR tablet  Commonly known as: KLOR-CON  What changed: when to take this   10 mEq, Oral, Daily      predniSONE 5 MG tablet  Commonly known as: DELTASONE  What changed: when to take this   TAKE 1 TABLET EVERY DAY         Continue These Medications      Instructions Start Date   acetaminophen 325 MG tablet  Commonly known as: TYLENOL   650 mg, Oral, Every 6 Hours PRN      Biofreeze 4 % gel  Generic drug: Menthol (Topical Analgesic)   1 application, Topical, Every 6 Hours PRN      Cholecalciferol 50 MCG (2000 UT) tablet   50 mcg, Oral, Every Morning      divalproex 125 MG DR tablet  Commonly known as: DEPAKOTE   125 mg, Oral, 2 Times Daily      melatonin 5 MG tablet tablet   5 mg, Oral, Nightly      memantine 10 MG tablet  Commonly known as: NAMENDA   10 mg, Oral, 2 Times Daily      montelukast 10 MG tablet  Commonly known as: SINGULAIR   10 mg, Oral, Nightly      traZODone 50 MG tablet  Commonly known as: DESYREL   25 mg, Oral, Every 6 Hours PRN     "  traZODone 100 MG tablet  Commonly known as: DESYREL   100 mg, Oral, Nightly         Stop These Medications    warfarin 5 MG tablet  Commonly known as: COUMADIN            Allergies   Allergen Reactions   • Chlorhexidine Rash and Unknown - Low Severity     Very red skin INCLUDING CHORAPREP!!. Patient states she had no problem with CHG solution or CHG wipes  for surgery.  Red and hot   • Dilaudid [Hydromorphone Hcl] Anaphylaxis   • Ace Inhibitors Cough   • Bactrim [Sulfamethoxazole-Trimethoprim] Hives and Itching   • Chloraprep One Step [Chlorhexidine Gluconate] Rash     Red and hot   • Ciprofloxacin Rash   • Codeine Unknown (See Comments)     Unknown     • Keflex [Cephalexin] Unknown (See Comments)     Unknown; pt tolerated ceftriaxone on 10/22/2020     • Latex Rash   • Penicillins Rash     Rash required tx with \"pill\" doctor prescribed but no ED visit; pt tolerated ceftriaxone on 10/22/2020         Discharge Disposition:  Skilled Nursing Facility (DC - External)      Discharge Diet:  Diet Order   Procedures   • Diet: Cardiac Diets; Healthy Heart (2-3 Na+); Texture: Regular Texture (IDDSI 7); Fluid Consistency: Thin (IDDSI 0)       Discharge Activity:   Activity Instructions     Activity as Tolerated            CODE STATUS:    Code Status and Medical Interventions:   Ordered at: 04/22/23 1901     Medical Intervention Limits:    NO intubation (DNI)     Code Status (Patient has no pulse and is not breathing):    No CPR (Do Not Attempt to Resuscitate)     Medical Interventions (Patient has pulse or is breathing):    Limited Support       No future appointments.   Follow-up Information     Santhosh Rubio MD .    Specialty: Family Medicine  Contact information:  [address] [place], [place] [address] [phone]                         Time Spent on Discharge:  Greater than 30 minutes spent on discharge management including final examination, discussion of hospital stay and patient education, " preparation of records, medication reconciliation, follow up planning      Ramiro Girard MD  [facility]  04/24/23  17:59 EDT

## 2023-04-24 NOTE — DISCHARGE PLACEMENT REQUEST
"Pa Jurado (82 y.o. Female)     Date of Birth   1940    Social Security Number       Address   [address] [place] [id number]    Home Phone   [phone]    MRN   [id number]       [employer]   [employer]    Marital Status                               Admission Date   4/22/23    Admission Type   Emergency    Admitting Provider   Dulce Prince MD    Attending Provider   Ramiro Girard MD    Department, Room/Bed   [address], [address]       Discharge Date       Discharge Disposition       Discharge Destination                               Attending Provider: Ramiro Girard MD    Allergies: Chlorhexidine, Dilaudid [Hydromorphone Hcl], Ace Inhibitors, Bactrim [Sulfamethoxazole-trimethoprim], Chloraprep One Step [Chlorhexidine Gluconate], Ciprofloxacin, Codeine, Keflex [Cephalexin], Latex, Penicillins    Isolation: None   Infection: None   Code Status: No CPR    Ht: 162.6 cm (64\")   Wt: 68 kg (149 lb 14.6 oz)    Admission Cmt: None   Principal Problem: Traumatic hematoma of buttock, initial encounter [S30.0XXA]                 Active Insurance as of 4/22/2023     Primary Coverage     Payor Plan Insurance Group Employer/Plan Group    HUMANA MEDICARE REPLACEMENT HUMANA MEDICARE REPLACEMENT [id number]     Payor Plan Address Payor Plan Phone Number Payor Plan Fax Number Effective Dates    [address] [address] [phone]  4/1/2022 - None Entered    [facility] [address]       Subscriber Name Subscriber Birth Date Member ID       PA JURADO 1940 [id number]                 Emergency Contacts      (Rel.) Home Phone Work Phone Mobile Phone    HA CANTU (Daughter) [phone] -- [phone]    DeandreYokoe (Son) [phone] -- --    Jessica Morrow (Daughter) [phone] -- --              "

## 2023-04-25 NOTE — CASE MANAGEMENT/SOCIAL WORK
Case Management Discharge Note      Final Note: DC back to [facility], daughter transported back to facility         Selected Continued Care - Discharged on 4/24/2023 Admission date: 4/22/2023 - Discharge disposition: Skilled Nursing Facility (DC - External)    Destination    No services have been selected for the patient.              Durable Medical Equipment    No services have been selected for the patient.              Dialysis/Infusion    No services have been selected for the patient.              Home Medical Care    No services have been selected for the patient.              Therapy    No services have been selected for the patient.              Community Resources    No services have been selected for the patient.              Community & DME    No services have been selected for the patient.                  Transportation Services  Private: Car    Final Discharge Disposition Code: 03 - skilled nursing facility (SNF)   No

## 2023-06-12 NOTE — TELEPHONE ENCOUNTER
Provider: YOEL BOJORQUEZ    Caller: HA    Relationship to Patient: DAUGHTER    Pharmacy: NA    Phone Number: [phone]    Reason for Call: PATIENTS DAUGHTER STATES THAT PA HAS CALLED HER TWICE TODAY TO ASK HER TO TAKE HER HOME EVEN THOUGH SHE IS AT HOME, AND SHE IS NOT RECOGNIZING HER  AND THINKS THAT ITS SOMEONE ELSE. SOUNDS VERY CONFUSED AND WANTS TO KNOW WHAT YOEL MIGHT SUGGEST. SHE SAID MORNING IS BETTER BUT HERE LATELY HAS BEEN PRETTY MUCH ALL DAY. PLEASE ADVISE.     When was the patient last seen: 7-13-21     Ears: no ear pain and no hearing problems. Nose: no nasal congestion and no nasal drainage. Mouth/Throat: no dysphagia, no hoarseness and no throat pain. Neck: no lumps, no pain, no stiffness and no swollen glands.

## 2023-06-29 NOTE — ANESTHESIA PROCEDURE NOTES
PAIN Epidural block    Patient location during procedure: pain clinic  Start Time: 11/28/2017 12:18 PM  Stop Time: 11/28/2017 12:27 PM  Indication:procedure for pain  Performed By  Anesthesiologist: NENA LY  Preanesthetic Checklist  Completed: patient identified, site marked, surgical consent, pre-op evaluation, timeout performed, IV checked, risks and benefits discussed and monitors and equipment checked  Additional Notes  Fluoro  Lumbar radiculopathy  Prep:  Pt Position:prone  Sterile Tech:cap, gloves, mask and sterile barrier  Prep:povidone-iodine 7.5% surgical scrub  Monitoring:blood pressure monitoring, continuous pulse oximetry and EKG  Procedure:  Sedation: no   Approach:midline  Guidance: fluoroscopy  Location:lumbar  Level:4-5  Needle Type:Tuohy  Needle Gauge:20  Aspiration:negative  Medications:  Depomedrol:80  Preservative Free Saline:3mL  Isovue:3mL  Comments:B/l spread  Post Assessment:  Dressing:occlusive dressing applied  Pt Tolerance:patient tolerated the procedure well with no apparent complications  Complications:no            
CP